# Patient Record
Sex: MALE | Race: WHITE | NOT HISPANIC OR LATINO | Employment: OTHER | URBAN - METROPOLITAN AREA
[De-identification: names, ages, dates, MRNs, and addresses within clinical notes are randomized per-mention and may not be internally consistent; named-entity substitution may affect disease eponyms.]

---

## 2019-09-12 ENCOUNTER — HISTORICAL (OUTPATIENT)
Dept: ADMINISTRATIVE | Facility: HOSPITAL | Age: 49
End: 2019-09-12

## 2019-09-23 ENCOUNTER — HISTORICAL (OUTPATIENT)
Dept: ADMINISTRATIVE | Facility: HOSPITAL | Age: 49
End: 2019-09-23

## 2019-09-30 ENCOUNTER — HISTORICAL (OUTPATIENT)
Dept: ADMINISTRATIVE | Facility: HOSPITAL | Age: 49
End: 2019-09-30

## 2019-09-30 LAB
ABS NEUT (OLG): 6.12 X10(3)/MCL (ref 2.1–9.2)
ALBUMIN SERPL-MCNC: 3.6 GM/DL (ref 3.4–5)
ALBUMIN/GLOB SERPL: 1.2 {RATIO}
ALP SERPL-CCNC: 76 UNIT/L (ref 50–136)
ALT SERPL-CCNC: 17 UNIT/L (ref 12–78)
AST SERPL-CCNC: 7 UNIT/L (ref 15–37)
BASOPHILS # BLD AUTO: 0 X10(3)/MCL (ref 0–0.2)
BASOPHILS NFR BLD AUTO: 0 %
BILIRUB SERPL-MCNC: 0.2 MG/DL (ref 0.2–1)
BILIRUBIN DIRECT+TOT PNL SERPL-MCNC: 0.1 MG/DL (ref 0–0.2)
BILIRUBIN DIRECT+TOT PNL SERPL-MCNC: 0.1 MG/DL (ref 0–0.8)
BUN SERPL-MCNC: 20 MG/DL (ref 7–18)
CALCIUM SERPL-MCNC: 9.2 MG/DL (ref 8.5–10.1)
CHLORIDE SERPL-SCNC: 102 MMOL/L (ref 98–107)
CO2 SERPL-SCNC: 33 MMOL/L (ref 21–32)
CREAT SERPL-MCNC: 1.05 MG/DL (ref 0.7–1.3)
CRP SERPL HS-MCNC: 13.6 MG/L (ref 0–3)
EOSINOPHIL # BLD AUTO: 0.2 X10(3)/MCL (ref 0–0.9)
EOSINOPHIL NFR BLD AUTO: 2 %
ERYTHROCYTE [DISTWIDTH] IN BLOOD BY AUTOMATED COUNT: 13.2 % (ref 11.5–17)
ERYTHROCYTE [SEDIMENTATION RATE] IN BLOOD: 20 MM/HR (ref 0–15)
EST. AVERAGE GLUCOSE BLD GHB EST-MCNC: 128 MG/DL
GLOBULIN SER-MCNC: 3 GM/DL (ref 2.4–3.5)
GLUCOSE SERPL-MCNC: 86 MG/DL (ref 74–106)
HBA1C MFR BLD: 6.1 % (ref 4.2–6.3)
HCT VFR BLD AUTO: 36.4 % (ref 42–52)
HGB BLD-MCNC: 11.4 GM/DL (ref 14–18)
LYMPHOCYTES # BLD AUTO: 2.4 X10(3)/MCL (ref 0.6–4.6)
LYMPHOCYTES NFR BLD AUTO: 25 %
MCH RBC QN AUTO: 29.9 PG (ref 27–31)
MCHC RBC AUTO-ENTMCNC: 31.3 GM/DL (ref 33–36)
MCV RBC AUTO: 95.5 FL (ref 80–94)
MONOCYTES # BLD AUTO: 0.7 X10(3)/MCL (ref 0.1–1.3)
MONOCYTES NFR BLD AUTO: 7 %
NEUTROPHILS # BLD AUTO: 6.12 X10(3)/MCL (ref 2.1–9.2)
NEUTROPHILS NFR BLD AUTO: 64 %
PLATELET # BLD AUTO: 288 X10(3)/MCL (ref 130–400)
PMV BLD AUTO: 10.1 FL (ref 9.4–12.4)
POTASSIUM SERPL-SCNC: 4.6 MMOL/L (ref 3.5–5.1)
PREALB SERPL-MCNC: 29.3 MG/DL (ref 18–38)
PROT SERPL-MCNC: 6.6 GM/DL (ref 6.4–8.2)
RBC # BLD AUTO: 3.81 X10(6)/MCL (ref 4.7–6.1)
SODIUM SERPL-SCNC: 137 MMOL/L (ref 136–145)
WBC # SPEC AUTO: 9.5 X10(3)/MCL (ref 4.5–11.5)

## 2019-10-14 ENCOUNTER — HISTORICAL (OUTPATIENT)
Dept: ADMINISTRATIVE | Facility: HOSPITAL | Age: 49
End: 2019-10-14

## 2019-11-07 ENCOUNTER — HISTORICAL (OUTPATIENT)
Dept: ADMINISTRATIVE | Facility: HOSPITAL | Age: 49
End: 2019-11-07

## 2019-11-22 ENCOUNTER — HISTORICAL (OUTPATIENT)
Dept: ADMINISTRATIVE | Facility: HOSPITAL | Age: 49
End: 2019-11-22

## 2020-02-03 ENCOUNTER — HISTORICAL (OUTPATIENT)
Dept: ADMINISTRATIVE | Facility: HOSPITAL | Age: 50
End: 2020-02-03

## 2020-02-03 LAB
ABS NEUT (OLG): 6.74 X10(3)/MCL (ref 2.1–9.2)
ALBUMIN SERPL-MCNC: 3.6 GM/DL (ref 3.4–5)
ALBUMIN/GLOB SERPL: 1.1 {RATIO}
ALP SERPL-CCNC: 91 UNIT/L (ref 50–136)
ALT SERPL-CCNC: 19 UNIT/L (ref 12–78)
AST SERPL-CCNC: 10 UNIT/L (ref 15–37)
BASOPHILS # BLD AUTO: 0.1 X10(3)/MCL (ref 0–0.2)
BASOPHILS NFR BLD AUTO: 1 %
BILIRUB SERPL-MCNC: 0.3 MG/DL (ref 0.2–1)
BILIRUBIN DIRECT+TOT PNL SERPL-MCNC: 0.1 MG/DL (ref 0–0.2)
BILIRUBIN DIRECT+TOT PNL SERPL-MCNC: 0.2 MG/DL (ref 0–0.8)
BUN SERPL-MCNC: 18 MG/DL (ref 7–18)
CALCIUM SERPL-MCNC: 8.7 MG/DL (ref 8.5–10.1)
CHLORIDE SERPL-SCNC: 101 MMOL/L (ref 98–107)
CO2 SERPL-SCNC: 27 MMOL/L (ref 21–32)
CREAT SERPL-MCNC: 1.13 MG/DL (ref 0.7–1.3)
CRP SERPL HS-MCNC: 23.4 MG/L (ref 0–3)
EOSINOPHIL # BLD AUTO: 0.3 X10(3)/MCL (ref 0–0.9)
EOSINOPHIL NFR BLD AUTO: 3 %
ERYTHROCYTE [DISTWIDTH] IN BLOOD BY AUTOMATED COUNT: 13.9 % (ref 11.5–17)
ERYTHROCYTE [SEDIMENTATION RATE] IN BLOOD: 36 MM/HR (ref 0–15)
EST. AVERAGE GLUCOSE BLD GHB EST-MCNC: 120 MG/DL
GLOBULIN SER-MCNC: 3.4 GM/DL (ref 2.4–3.5)
GLUCOSE SERPL-MCNC: 100 MG/DL (ref 74–106)
HBA1C MFR BLD: 5.8 % (ref 4.2–6.3)
HCT VFR BLD AUTO: 34.5 % (ref 42–52)
HGB BLD-MCNC: 11.2 GM/DL (ref 14–18)
LYMPHOCYTES # BLD AUTO: 1.8 X10(3)/MCL (ref 0.6–4.6)
LYMPHOCYTES NFR BLD AUTO: 18 %
MCH RBC QN AUTO: 31.2 PG (ref 27–31)
MCHC RBC AUTO-ENTMCNC: 32.5 GM/DL (ref 33–36)
MCV RBC AUTO: 96.1 FL (ref 80–94)
MONOCYTES # BLD AUTO: 0.7 X10(3)/MCL (ref 0.1–1.3)
MONOCYTES NFR BLD AUTO: 7 %
NEUTROPHILS # BLD AUTO: 6.74 X10(3)/MCL (ref 2.1–9.2)
NEUTROPHILS NFR BLD AUTO: 68 %
PLATELET # BLD AUTO: 326 X10(3)/MCL (ref 130–400)
PMV BLD AUTO: 9.7 FL (ref 9.4–12.4)
POTASSIUM SERPL-SCNC: 4.6 MMOL/L (ref 3.5–5.1)
PREALB SERPL-MCNC: 25.2 MG/DL (ref 18–38)
PROT SERPL-MCNC: 7 GM/DL (ref 6.4–8.2)
RBC # BLD AUTO: 3.59 X10(6)/MCL (ref 4.7–6.1)
SODIUM SERPL-SCNC: 134 MMOL/L (ref 136–145)
WBC # SPEC AUTO: 9.9 X10(3)/MCL (ref 4.5–11.5)

## 2020-02-10 ENCOUNTER — HISTORICAL (OUTPATIENT)
Dept: ADMINISTRATIVE | Facility: HOSPITAL | Age: 50
End: 2020-02-10

## 2020-02-13 LAB — FINAL CULTURE: NORMAL

## 2020-02-19 ENCOUNTER — HISTORICAL (OUTPATIENT)
Dept: ADMINISTRATIVE | Facility: HOSPITAL | Age: 50
End: 2020-02-19

## 2020-02-27 ENCOUNTER — HISTORICAL (OUTPATIENT)
Dept: ADMINISTRATIVE | Facility: HOSPITAL | Age: 50
End: 2020-02-27

## 2020-03-09 ENCOUNTER — HISTORICAL (OUTPATIENT)
Dept: ADMINISTRATIVE | Facility: HOSPITAL | Age: 50
End: 2020-03-09

## 2022-04-27 ENCOUNTER — HISTORICAL (OUTPATIENT)
Dept: ADMINISTRATIVE | Facility: HOSPITAL | Age: 52
End: 2022-04-27
Payer: MEDICARE

## 2022-04-27 LAB
ABS NEUT (OLG): 5.8 (ref 2.1–9.2)
ALBUMIN SERPL-MCNC: 3.3 G/DL (ref 3.5–5)
ALBUMIN/GLOB SERPL: 1.2 {RATIO} (ref 1.1–2)
ALP SERPL-CCNC: 68 U/L (ref 40–150)
ALT SERPL-CCNC: 17 U/L (ref 0–55)
AMYLASE SERPL-CCNC: 53 U/L (ref 25–125)
AST SERPL-CCNC: 13 U/L (ref 5–34)
BASOPHILS # BLD AUTO: 0.1 10*3/UL (ref 0–0.2)
BASOPHILS NFR BLD AUTO: 1 %
BILIRUB SERPL-MCNC: 0.4 MG/DL
BILIRUBIN DIRECT+TOT PNL SERPL-MCNC: 0.1 (ref 0–0.5)
BILIRUBIN DIRECT+TOT PNL SERPL-MCNC: 0.3 (ref 0–0.8)
BUN SERPL-MCNC: 12.5 MG/DL (ref 8.4–25.7)
CALCIUM SERPL-MCNC: 8.9 MG/DL (ref 8.7–10.5)
CHLORIDE SERPL-SCNC: 104 MMOL/L (ref 98–107)
CO2 SERPL-SCNC: 29 MMOL/L (ref 22–29)
CREAT SERPL-MCNC: 0.96 MG/DL (ref 0.73–1.18)
EOSINOPHIL # BLD AUTO: 0.4 10*3/UL (ref 0–0.9)
EOSINOPHIL NFR BLD AUTO: 4 %
ERYTHROCYTE [DISTWIDTH] IN BLOOD BY AUTOMATED COUNT: 16.3 % (ref 11.5–17)
GLOBULIN SER-MCNC: 2.8 G/DL (ref 2.4–3.5)
GLUCOSE SERPL-MCNC: 77 MG/DL (ref 74–100)
HCT VFR BLD AUTO: 32.4 % (ref 42–52)
HEMOLYSIS INTERF INDEX SERPL-ACNC: 2
HGB BLD-MCNC: 10.3 G/DL (ref 14–18)
ICTERIC INTERF INDEX SERPL-ACNC: 1
IGA SERPL-MCNC: 155 MG/DL (ref 63–484)
LIPASE SERPL-CCNC: 31 U/L
LIPEMIC INTERF INDEX SERPL-ACNC: 4
LYMPHOCYTES # BLD AUTO: 2.4 10*3/UL (ref 0.6–4.6)
LYMPHOCYTES NFR BLD AUTO: 25 %
MANUAL DIFF? (OHS): NO
MCH RBC QN AUTO: 29.3 PG (ref 27–31)
MCHC RBC AUTO-ENTMCNC: 31.8 G/DL (ref 33–36)
MCV RBC AUTO: 92 FL (ref 80–94)
MONOCYTES # BLD AUTO: 0.8 10*3/UL (ref 0.1–1.3)
MONOCYTES NFR BLD AUTO: 8 %
NEUTROPHILS # BLD AUTO: 5.8 10*3/UL (ref 2.1–9.2)
NEUTROPHILS NFR BLD AUTO: 60 %
PLATELET # BLD AUTO: 298 10*3/UL (ref 130–400)
PMV BLD AUTO: 11.1 FL (ref 9.4–12.4)
POTASSIUM SERPL-SCNC: 4.7 MMOL/L (ref 3.5–5.1)
PROT SERPL-MCNC: 6.1 G/DL (ref 6.4–8.3)
RBC # BLD AUTO: 3.52 10*6/UL (ref 4.7–6.1)
SODIUM SERPL-SCNC: 140 MMOL/L (ref 136–145)
TSH SERPL-ACNC: 1.87 M[IU]/L (ref 0.35–4.94)
WBC # SPEC AUTO: 9.6 10*3/UL (ref 4.5–11.5)

## 2022-05-19 ENCOUNTER — OFFICE VISIT (OUTPATIENT)
Dept: WOUND CARE | Facility: HOSPITAL | Age: 52
End: 2022-05-19
Attending: EMERGENCY MEDICINE
Payer: MEDICARE

## 2022-05-19 VITALS
SYSTOLIC BLOOD PRESSURE: 172 MMHG | DIASTOLIC BLOOD PRESSURE: 64 MMHG | BODY MASS INDEX: 45.1 KG/M2 | RESPIRATION RATE: 20 BRPM | TEMPERATURE: 98 F | HEART RATE: 74 BPM | HEIGHT: 70 IN | WEIGHT: 315 LBS

## 2022-05-19 DIAGNOSIS — L97.519 ULCER OF RIGHT GREAT TOE DUE TO DIABETES MELLITUS: ICD-10-CM

## 2022-05-19 DIAGNOSIS — F32.9 REACTIVE DEPRESSION: ICD-10-CM

## 2022-05-19 DIAGNOSIS — E11.42 DIABETIC POLYNEUROPATHY ASSOCIATED WITH TYPE 2 DIABETES MELLITUS: ICD-10-CM

## 2022-05-19 DIAGNOSIS — E11.621 ULCER OF RIGHT GREAT TOE DUE TO DIABETES MELLITUS: ICD-10-CM

## 2022-05-19 DIAGNOSIS — E66.01 MORBID OBESITY: ICD-10-CM

## 2022-05-19 DIAGNOSIS — E78.2 MIXED HYPERLIPIDEMIA: ICD-10-CM

## 2022-05-19 DIAGNOSIS — F31.81 BIPOLAR II DISORDER: ICD-10-CM

## 2022-05-19 DIAGNOSIS — L97.509 TYPE 2 DIABETES MELLITUS WITH FOOT ULCER, WITHOUT LONG-TERM CURRENT USE OF INSULIN: Primary | ICD-10-CM

## 2022-05-19 DIAGNOSIS — E11.621 TYPE 2 DIABETES MELLITUS WITH FOOT ULCER, WITHOUT LONG-TERM CURRENT USE OF INSULIN: Primary | ICD-10-CM

## 2022-05-19 DIAGNOSIS — G89.4 CHRONIC PAIN SYNDROME: ICD-10-CM

## 2022-05-19 DIAGNOSIS — E11.621 DIABETIC ULCER OF TOE OF LEFT FOOT ASSOCIATED WITH TYPE 2 DIABETES MELLITUS, WITH NECROSIS OF BONE: ICD-10-CM

## 2022-05-19 DIAGNOSIS — I10 ESSENTIAL HYPERTENSION, BENIGN: ICD-10-CM

## 2022-05-19 DIAGNOSIS — L97.524 DIABETIC ULCER OF TOE OF LEFT FOOT ASSOCIATED WITH TYPE 2 DIABETES MELLITUS, WITH NECROSIS OF BONE: ICD-10-CM

## 2022-05-19 PROCEDURE — 11042 DBRDMT SUBQ TIS 1ST 20SQCM/<: CPT

## 2022-05-19 RX ORDER — PROMETHAZINE HYDROCHLORIDE 12.5 MG/1
TABLET ORAL
COMMUNITY
Start: 2022-04-27

## 2022-05-19 RX ORDER — QUETIAPINE FUMARATE 100 MG/1
200 TABLET, FILM COATED ORAL NIGHTLY
COMMUNITY
Start: 2022-05-17

## 2022-05-19 RX ORDER — METFORMIN HYDROCHLORIDE 1000 MG/1
1000 TABLET ORAL 2 TIMES DAILY
COMMUNITY
Start: 2022-05-10

## 2022-05-19 RX ORDER — PREGABALIN 200 MG/1
200 CAPSULE ORAL 3 TIMES DAILY
COMMUNITY
Start: 2022-05-17

## 2022-05-19 RX ORDER — TADALAFIL 5 MG/1
5 TABLET ORAL DAILY
COMMUNITY
Start: 2022-05-10

## 2022-05-19 RX ORDER — ROSUVASTATIN CALCIUM 10 MG/1
10 TABLET, COATED ORAL DAILY
COMMUNITY
Start: 2022-04-29

## 2022-05-19 RX ORDER — TIZANIDINE 4 MG/1
TABLET ORAL
COMMUNITY
Start: 2022-05-05 | End: 2022-12-12 | Stop reason: ALTCHOICE

## 2022-05-19 RX ORDER — SILODOSIN 8 MG/1
8 CAPSULE ORAL DAILY
COMMUNITY
Start: 2022-02-16

## 2022-05-19 RX ORDER — ZOLPIDEM TARTRATE 10 MG/1
10 TABLET ORAL NIGHTLY
COMMUNITY
Start: 2022-05-17

## 2022-05-19 RX ORDER — INDOMETHACIN 25 MG/1
CAPSULE ORAL
COMMUNITY
Start: 2022-04-14

## 2022-05-19 RX ORDER — ALLOPURINOL 300 MG/1
TABLET ORAL
COMMUNITY
Start: 2022-04-29

## 2022-05-19 RX ORDER — KETOROLAC TROMETHAMINE 30 MG/ML
INJECTION, SOLUTION INTRAMUSCULAR; INTRAVENOUS
COMMUNITY
Start: 2022-05-16

## 2022-05-19 RX ORDER — LISINOPRIL 40 MG/1
40 TABLET ORAL 2 TIMES DAILY
COMMUNITY
Start: 2022-03-09

## 2022-05-19 RX ORDER — OMEPRAZOLE 40 MG/1
40 CAPSULE, DELAYED RELEASE ORAL 2 TIMES DAILY
COMMUNITY
Start: 2022-05-12

## 2022-05-19 NOTE — PROCEDURES
"Debridement    Date/Time: 5/19/2022 1:00 PM  Performed by: Soheila Vidal MD  Authorized by: Soheila Vidal MD     Time out: Immediately prior to procedure a "time out" was called to verify the correct patient, procedure, equipment, support staff and site/side marked as required.    Consent Done?:  Yes (Written)    Preparation: Patient was prepped and draped in usual sterile fashion    Local anesthesia used?: Yes    Local anesthetic:  Lidocaine 2% topical gel    Wound Details:    Location:  Right foot    Location:  Right 1st Toe    Type of Debridement:  Excisional       Length (cm):  1       Area (sq cm):  1       Width (cm):  1       Percent Debrided (%):  100       Depth (cm):  0.7       Total Area Debrided (sq cm):  1    Depth of debridement:  Subcutaneous tissue    Tissue debrided:  Dermis, Epidermis and Subcutaneous    Devitalized tissue debrided:  Biofilm and Slough    Instruments:  Curette    Bleeding:  Minimal  Hemostasis Achieved: Yes    Method Used:  Pressure  Patient tolerance:  Patient tolerated the procedure well with no immediate complications      "

## 2022-05-19 NOTE — PROGRESS NOTES
Subjective:       Patient ID: Jong Clayton is a 51 y.o. male.    Chief Complaint: No chief complaint on file.    51-year-old obese white male with diabetes, hypertension, dyslipidemia, bipolar and depression along with prior DFU's with osteomyelitis (left 2nd toe 2019 and again 2020). I did treat him when he had this issue and noted then he was noncompliant with my recommendations. I last saw him on 3/9/20 where he had a bulbous left 2nd hammer toe with +MRI for osteo. He never returned as scheduled but ultimately had his ortho removed the distal phalanx/ involved portion of the osteomyelitis and wound healed..    He comes in today on 5/19/22 upon his own self referral complaining of a right plantar diabetic foot ulcer on the right great toe that began probably around December of 2021. He did not seek treatment at all.  I believe his wife is the one that finally got him to come to the clinic.  He remains an uncontrolled diabetic.  I looked at his labs and saw that his last hemoglobin A1c last month was 9.1: he says he was not even aware of this. Says he is seeing PCP next month (I think [facility]).  Ongoing neuropathy/no pain to feet. No fever/chills; not sure how the ulcer began.      Review of Systems   Constitutional: Negative for chills and fever.   HENT: Negative for sore throat.    Respiratory: Negative for chest tightness and shortness of breath.    Cardiovascular: Negative for palpitations and leg swelling.   Gastrointestinal: Positive for abdominal pain (gallstones; plans removal) and diarrhea (seeing GI for scopes).   Genitourinary: Negative for difficulty urinating.   Neurological: Positive for numbness (to both feet). Negative for dizziness, syncope and weakness.         Objective:      Physical Exam  Constitutional:       Appearance: He is obese.   Eyes:      Conjunctiva/sclera: Conjunctivae normal.   Cardiovascular:      Pulses: Normal pulses.           Dorsalis pedis pulses are 2+ on the right side and  2+ on the left side.      Comments: +hair on legs  Musculoskeletal:      Right lower leg: No edema.      Left lower leg: No edema.        Feet:       Comments: Prior left partial 2nd toe amputation; healed   Neurological:      Mental Status: He is alert.              Wound 05/19/22 1340 Diabetic Ulcer Right lateral Toe, first #1 (Active)   05/19/22 1340    Pre-existing: Yes   Primary Wound Type: Diabetic ulc   Side: Right   Orientation: lateral   Location: Toe, first   Wound Number: #1   Ankle-Brachial Index: Non Compressable   Pulses: Biphasic   Removal Indication and Assessment:    Wound Outcome:    (Retired) Wound Type:    (Retired) Wound Length (cm):    (Retired) Wound Width (cm):    (Retired) Depth (cm):    Wound Description (Comments):    Removal Indications:    Wound Image   05/19/22 1348   Dressing Appearance No dressing 05/19/22 1340   Drainage Amount Moderate 05/19/22 1340   Drainage Characteristics/Odor Serosanguineous;Yellow 05/19/22 1340   Appearance Pink;Fibrin;Other (see comments) 05/19/22 1340   Tissue loss description Full thickness 05/19/22 1340   Black (%), Wound Tissue Color 0 % 05/19/22 1340   Red (%), Wound Tissue Color 0 % 05/19/22 1340   Yellow (%), Wound Tissue Color 50 % 05/19/22 1340   Periwound Area Intact;Dry 05/19/22 1340   Wound Edges Defined 05/19/22 1340   Sanchez Classification (diabetic foot ulcers only) Grade 1 05/19/22 1340   Wound Length (cm) 1 cm 05/19/22 1340   Wound Width (cm) 1 cm 05/19/22 1340   Wound Depth (cm) 0.7 cm 05/19/22 1340   Wound Volume (cm^3) 0.7 cm^3 05/19/22 1340   Wound Surface Area (cm^2) 1 cm^2 05/19/22 1340   Care Cleansed with:;Soap and water 05/19/22 1340   Dressing Applied 05/19/22 1340   Periwound Care Absorptive dressing applied 05/19/22 1340           Assessment:       1. Type 2 diabetes mellitus with foot ulcer, without long-term current use of insulin    2. Ulcer of right great toe due to diabetes mellitus    3. Diabetic polyneuropathy associated  with type 2 diabetes mellitus    4. Diabetic ulcer of toe of left foot associated with type 2 diabetes mellitus, with necrosis of bone    5. Morbid obesity    6. Essential hypertension, benign    7. Bipolar II disorder    8. Mixed hyperlipidemia    9. Reactive depression    10. Chronic pain syndrome          1. Right hallux DFU: first clinic visit 5/19/22  2. Uncontrolled diabetes, hba1c 9.1 April 2022  3. Prior left 2nd toe DFU with osteomyelitis: 2020  4. Morbid obesity with bmi 54  5. Hypertension  6. Hyperlipidemia  7. H/o depression/bipolar        Lab Results   Component Value Date    WBC 9.6 04/27/2022    HGB 10.3 04/27/2022    HCT 32.4 04/27/2022    MCV 92.0 04/27/2022     04/27/2022         CMP  Sodium Level   Date Value Ref Range Status   04/27/2022 140 136 - 145      Potassium Level   Date Value Ref Range Status   04/27/2022 4.7 3.5 - 5.1      Carbon Dioxide   Date Value Ref Range Status   04/27/2022 29 22 - 29      Blood Urea Nitrogen   Date Value Ref Range Status   04/27/2022 12.5 8.4 - 25.7      Creatinine   Date Value Ref Range Status   04/27/2022 0.96 0.73 - 1.18      Calcium Level Total   Date Value Ref Range Status   04/27/2022 8.9 8.7 - 10.5      Albumin Level   Date Value Ref Range Status   04/27/2022 3.3 3.5 - 5.0      Bilirubin Total   Date Value Ref Range Status   04/27/2022 0.4 <=1.5      Alkaline Phosphatase   Date Value Ref Range Status   04/27/2022 68 40 - 150      Aspartate Aminotransferase   Date Value Ref Range Status   04/27/2022 13 5 - 34      Alanine Aminotransferase   Date Value Ref Range Status   04/27/2022 17 0 - 55      Estimated GFR-Non    Date Value Ref Range Status   04/27/2022 >60       Lab Results   Component Value Date    HGBA1C 9.1 (H) 04/27/2021     Lab Results   Component Value Date    SEDRATE 36 (H) 02/03/2020     No results found for: CRP  @lastprealbumin@    Plan:                  Plan of Care:    1. Patient returned to this clinic for a new  issue/DFU of the right great toe that he has noted its presence for the past 6 months but chose to do nothing about it until his wife made him come here.  He is an uncontrolled obese diabetic with a hemoglobin A1c of over 9.  He has had a prior left 2nd toe DFU with osteomyelitis.    2.   Wound was assessed  3. Wound was debrided today.  4. Pt tolerated well  5. Wound Care Orders: dressings of medihoney to be applied to the wound and changed 3x/week and as needed. His wife will do the wound care.  6. I tried to get an xray today here in clinic but can't get orders to go over so he will get [facility] done [facility] before he comes back  7.  Monitor for any signs of infection: watch for increase drainage or pain, fevers, chills, swelling and report this to clinic  8. Offloading: MUST offload the toe /wound for it to heal. We will start with posterior wedge jojo but I suggest he get a rolling knee scooter; May have to use TCC in near future.  9. Nutrition: Must have a high protein diet to support wound  healing; (if renal disease, see nephrologist for amount allowed):  this should be over 100g protein /day (if no kidney issues); Also rec MVI along with vit C, vit D, zinc and Sidney  10. Diabetes: he is uncontrolled; he must get this number down; needs to see PCP and follow strict diet  11. Return to clinic 1 week         The time spent including preparing to see the patient, obtaining patient history and assessment, evaluation of the plan of care, patient/caregiver counseling and education, orders, documentation, coordination of care, and other professional medical management activities for today's encounter was 50 minutes.    Time spent performing procedures during today's encounter was 10 minutes.

## 2022-05-19 NOTE — PATIENT INSTRUCTIONS
Pt seen today by:     Home health and self care DRESSING INSTRUCTIONS:      Wound location: Right Great Toe    Dressings to be changed every other day and as needed if soiled or not intact.    Cleanse wound with wound cleanser or saline  Apply therahoney to the wound bed  Cover with abd pad and secure with cast padding/kerlix/cover roll tape  Wear Posterior Wedge Shoe     Wound may have been debrided in clinic: if so, WHAT YOU NEED TO KNOW:    Debridement is the removal of infected, damaged, or dead tissue so a wound can heal properly. Your wound may need more than one debridement. Debridement can cause bleeding, and a small amount of blood is expected.  AFTER A DEBRIDEMENT:    Keep your wound clean and dry. Do not remove the dressing unless instructed.  Follow the wound care orders provided to you or your home health care provider.  If you have pain, take over the counter pain relievers or pain medication if prescribed.  Elevate the wound and limit excessive activity to prevent bleeding and/or swelling in your wound.  If you see blood coming through the dressing, apply gauze and tape over the dressing and hold firm pressure to the wound with your hand for 5-10 minutes continuously, without peeking, to help the bleeding stop.  Contact [facility] wound care team at [phone] or go to the nearest Emergency department if:    You have a fever greater than 101 taken by mouth.  Your pain gets worse or does not go away, even after taking your regular pain medicine.  Your skin around your wound is red, hot, swollen, or draining pus.  You have bleeding that continues to come through the dressing after holding pressure for 10 minutes       Compression: You may be using a compressive type of dressings to control edema: If so, keep your compression wrap or tubigrip in place. Do not get them wet, they should feel snug. If they feel tight, or cause pain in any way,  elevate your legs above your heart for 15 minutes. If  the wraps still cause pain or you can not tolerate compression,  please remove and notify the clinic or your home health.     Nutrition:  The current daily value (%DV) for protein is 50 grams per day and is meant as a general goal for most people. Further increasing your dietary protein intake is very important for wound healing. Typically one needs over 100g of protein per day to help with wound healing needs.  If you are a dialysis patient or have problems with your kidneys, talk to your Nephrologist about how much protein you can take in with your condition.  Examples of high protein items that can be added to your diet include: eggs, chicken, red meats, almonds, cottage cheese, Greek yogurt, beans, and peanut butter.  Fortified protein bars, shakes and drinks can add 15-30 additional grams of protein per serving.   Also add:   1 daily general multivitamin   Sidney : 1 packet twice daily   Vitamin C : 500mg twice daily   Zinc 220 mg daily  Vit D : once daily    Call our [facility] wound clinic for questions/concerns a [phone] .

## 2022-05-25 ENCOUNTER — HOSPITAL ENCOUNTER (OUTPATIENT)
Dept: RADIOLOGY | Facility: HOSPITAL | Age: 52
Discharge: HOME | End: 2022-05-25
Attending: EMERGENCY MEDICINE
Payer: MEDICARE

## 2022-05-25 DIAGNOSIS — E11.42 DIABETIC POLYNEUROPATHY ASSOCIATED WITH TYPE 2 DIABETES MELLITUS: ICD-10-CM

## 2022-05-25 DIAGNOSIS — L97.519 ULCER OF RIGHT GREAT TOE DUE TO DIABETES MELLITUS: ICD-10-CM

## 2022-05-25 DIAGNOSIS — E11.621 ULCER OF RIGHT GREAT TOE DUE TO DIABETES MELLITUS: ICD-10-CM

## 2022-05-25 PROCEDURE — 73660 X-RAY EXAM OF TOE(S): CPT | Mod: TC,RT

## 2022-05-26 ENCOUNTER — HOSPITAL ENCOUNTER (OUTPATIENT)
Dept: WOUND CARE | Facility: HOSPITAL | Age: 52
Discharge: HOME | End: 2022-05-26
Attending: EMERGENCY MEDICINE
Payer: MEDICARE

## 2022-05-26 VITALS
DIASTOLIC BLOOD PRESSURE: 76 MMHG | WEIGHT: 315 LBS | BODY MASS INDEX: 45.1 KG/M2 | SYSTOLIC BLOOD PRESSURE: 155 MMHG | HEART RATE: 92 BPM | RESPIRATION RATE: 20 BRPM | TEMPERATURE: 98 F | HEIGHT: 70 IN

## 2022-05-26 DIAGNOSIS — L97.519 ULCER OF RIGHT GREAT TOE DUE TO DIABETES MELLITUS: Primary | ICD-10-CM

## 2022-05-26 DIAGNOSIS — L97.509 TYPE 2 DIABETES MELLITUS WITH FOOT ULCER, WITHOUT LONG-TERM CURRENT USE OF INSULIN: ICD-10-CM

## 2022-05-26 DIAGNOSIS — E11.42 DIABETIC POLYNEUROPATHY ASSOCIATED WITH TYPE 2 DIABETES MELLITUS: ICD-10-CM

## 2022-05-26 DIAGNOSIS — E11.621 ULCER OF RIGHT GREAT TOE DUE TO DIABETES MELLITUS: Primary | ICD-10-CM

## 2022-05-26 DIAGNOSIS — E66.01 MORBID OBESITY: ICD-10-CM

## 2022-05-26 DIAGNOSIS — E11.621 TYPE 2 DIABETES MELLITUS WITH FOOT ULCER, WITHOUT LONG-TERM CURRENT USE OF INSULIN: ICD-10-CM

## 2022-05-26 PROCEDURE — 11042 DBRDMT SUBQ TIS 1ST 20SQCM/<: CPT | Performed by: NURSE PRACTITIONER

## 2022-05-26 NOTE — PROCEDURES
"Debridement    Date/Time: 5/26/2022 1:00 PM  Performed by: Soheila Vidal MD  Authorized by: Soheila Vidal MD   Associated wounds:        Wound 05/19/22 1340 Diabetic Ulcer Right lateral Toe, first #1  Time out: Immediately prior to procedure a "time out" was called to verify the correct patient, procedure, equipment, support staff and site/side marked as required.      Preparation: Patient was prepped and draped in usual sterile fashion    Local anesthesia used?: Yes    Local anesthetic:  Lidocaine 2% topical gel    Wound Details:    Location:  Right foot    Location:  Right 1st Toe    Type of Debridement:  Excisional       Length (cm):  0.7       Area (sq cm):  0.21       Width (cm):  0.3       Percent Debrided (%):  100       Depth (cm):  0.2       Total Area Debrided (sq cm):  0.21    Depth of debridement:  Subcutaneous tissue    Tissue debrided:  Dermis, Epidermis and Subcutaneous    Devitalized tissue debrided:  Biofilm, Slough and Callus    Instruments:  Curette    Bleeding:  Minimal  Hemostasis Achieved: Yes    Method Used:  Pressure  Patient tolerance:  Patient tolerated the procedure well with no immediate complications      " No

## 2022-05-26 NOTE — PROGRESS NOTES
Subjective:       Patient ID: Jong Clayton is a 51 y.o. male.    Chief Complaint: No chief complaint on file.    51-year-old obese white male with diabetes, hypertension, dyslipidemia, bipolar and depression along with prior DFU's with osteomyelitis (left 2nd toe 2019 and again 2020). I did treat him when he had this issue and noted then he was noncompliant with my recommendations. I last saw him on 3/9/20 where he had a bulbous left 2nd hammer toe with +MRI for osteo. He never returned as scheduled but ultimately had his ortho removed the distal phalanx/ involved portion of the osteomyelitis and wound healed..    He came in on 5/19/22 upon his own self referral complaining of a right plantar diabetic foot ulcer on the right great toe that began probably around December 2021. He did not seek treatment at all.  I believe his wife is the one that finally got him to come to the clinic.  He remains an uncontrolled diabetic.  I looked at his labs and saw that his last hemoglobin A1c last month in April  was 9.1: he says he was not even aware of this. (seeing Aaron next month in June)  I ordered an xray of the right great toe and debrided it and began medihoney dressings and offloaded with posterior wedge Octavio shoe; wife doing wound care; she thinks a bit better      Review of Systems   Constitutional: Negative for chills and fever.   Neurological: Positive for numbness (to both feet).         Objective:      Physical Exam  Constitutional:       Appearance: He is obese.   Eyes:      Conjunctiva/sclera: Conjunctivae normal.   Cardiovascular:      Pulses: Normal pulses.           Dorsalis pedis pulses are 2+ on the right side and 2+ on the left side.      Comments: +hair on legs  Musculoskeletal:      Right lower leg: No edema.      Left lower leg: No edema.        Feet:       Comments: Prior left partial 2nd toe amputation; healed   Neurological:      Mental Status: He is alert.              Wound 05/19/22 1340  Diabetic Ulcer Right lateral Toe, first #1 (Active)   05/19/22 1340    Pre-existing: Yes   Primary Wound Type: Diabetic ulc   Side: Right   Orientation: lateral   Location: Toe, first   Wound Number: #1   Ankle-Brachial Index: Non Compressable   Pulses: Biphasic   Removal Indication and Assessment:    Wound Outcome:    (Retired) Wound Type:    (Retired) Wound Length (cm):    (Retired) Wound Width (cm):    (Retired) Depth (cm):    Wound Description (Comments):    Removal Indications:    Dressing Appearance Intact;Dried drainage 05/26/22 1316   Drainage Amount Moderate 05/26/22 1316   Drainage Characteristics/Odor Yellow;Serosanguineous 05/26/22 1316   Appearance Pink;Fibrin 05/26/22 1316   Tissue loss description Full thickness 05/26/22 1316   Black (%), Wound Tissue Color 0 % 05/26/22 1316   Red (%), Wound Tissue Color 75 % 05/26/22 1316   Yellow (%), Wound Tissue Color 25 % 05/26/22 1316   Periwound Area Intact;Dry 05/26/22 1316   Wound Edges Defined 05/26/22 1316   Wound Length (cm) 1 cm 05/26/22 1316   Wound Width (cm) 0.7 cm 05/26/22 1316   Wound Depth (cm) 0.3 cm 05/26/22 1316   Wound Volume (cm^3) 0.21 cm^3 05/26/22 1316   Wound Surface Area (cm^2) 0.7 cm^2 05/26/22 1316   Care Cleansed with:;Soap and water 05/26/22 1316   Dressing Applied;Absorptive Pad 05/26/22 1316   Periwound Care Absorptive dressing applied 05/26/22 1316           Assessment:       1. Ulcer of right great toe due to diabetes mellitus    2. Diabetic polyneuropathy associated with type 2 diabetes mellitus    3. Type 2 diabetes mellitus with foot ulcer, without long-term current use of insulin    4. Morbid obesity          1. Right hallux DFU: first clinic visit 5/19/22  2. Uncontrolled diabetes, hba1c 9.1 April 2022  3. Prior left 2nd toe DFU with osteomyelitis: 2020  4. Morbid obesity with bmi 54  5. Hypertension  6. Hyperlipidemia  7. H/o depression/bipolar        Lab Results   Component Value Date    WBC 9.6 04/27/2022    HGB 10.3  04/27/2022    HCT 32.4 04/27/2022    MCV 92.0 04/27/2022     04/27/2022         CMP  Sodium Level   Date Value Ref Range Status   04/27/2022 140 136 - 145      Potassium Level   Date Value Ref Range Status   04/27/2022 4.7 3.5 - 5.1      Carbon Dioxide   Date Value Ref Range Status   04/27/2022 29 22 - 29      Blood Urea Nitrogen   Date Value Ref Range Status   04/27/2022 12.5 8.4 - 25.7      Creatinine   Date Value Ref Range Status   04/27/2022 0.96 0.73 - 1.18      Calcium Level Total   Date Value Ref Range Status   04/27/2022 8.9 8.7 - 10.5      Albumin Level   Date Value Ref Range Status   04/27/2022 3.3 3.5 - 5.0      Bilirubin Total   Date Value Ref Range Status   04/27/2022 0.4 <=1.5      Alkaline Phosphatase   Date Value Ref Range Status   04/27/2022 68 40 - 150      Aspartate Aminotransferase   Date Value Ref Range Status   04/27/2022 13 5 - 34      Alanine Aminotransferase   Date Value Ref Range Status   04/27/2022 17 0 - 55      Estimated GFR-Non    Date Value Ref Range Status   04/27/2022 >60       Lab Results   Component Value Date    HGBA1C 9.1 (H) 04/27/2021     Lab Results   Component Value Date    SEDRATE 36 (H) 02/03/2020     XR TOE 2 OR MORE VIEWS RIGHT     FINDINGS:  There is a linear metallic density identified on the lateral projection on the plantar aspect of the foot exact etiology of this cannot be ascertained by this exam     No radiographic evidence to suggest the presence of osteomyelitis these might be lacking on plain films and therefore if clinically indicated other imaging modalities might prove helpful for further assessment     Soft tissue defect that may indicate the presence of an ulcer identified     Impression:     Soft tissue ulceration.     No radiographic evidence to suggest osteomyelitis.     Radiopaque metallic density as above.        Electronically signed by: Rohan Khanna  Date:                                            05/25/2022  Time:                                            13:55  (I told pt about the metallic density: no knowledge of it)  Plan:                  Plan of Care:    1. Wound was debrided today.  2. Pt tolerated well  3. Wound Care Orders: dressings of medihoney to be applied to the wound and changed 3x/week and as needed. His wife will do the wound care.  4. Reviewed the toe xray with patient and wife  5.  Monitor for any signs of infection: watch for increase drainage or pain, fevers, chills, swelling and report this to clinic  6. Offloading: MUST offload the toe /wound for it to heal. We will start with posterior wedge jojo but I suggest he get a rolling knee scooter; May have to use TCC in near future.  7. Nutrition: Must have a high protein diet to support wound  healing; (if renal disease, see nephrologist for amount allowed):  this should be over 100g protein /day (if no kidney issues); Also rec MVI along with vit C, vit D, zinc and Sidney  8. Diabetes: he is uncontrolled; he must get this number down; needs to see PCP and follow strict diet  9. Return to clinic 1 week

## 2022-05-26 NOTE — PATIENT INSTRUCTIONS
Pt seen today by:     Home health and self care DRESSING INSTRUCTIONS:      Wound location: Right Great Toe    Dressings to be changed every other day and as needed if soiled or not intact.    Cleanse wound with wound cleanser or saline  Apply therahoney to the wound bed  Cover with abd pad and secure with cast padding/kerlix/cover roll tape  Wear Posterior Wedge Shoe     Wound may have been debrided in clinic: if so, WHAT YOU NEED TO KNOW:    Debridement is the removal of infected, damaged, or dead tissue so a wound can heal properly. Your wound may need more than one debridement. Debridement can cause bleeding, and a small amount of blood is expected.  AFTER A DEBRIDEMENT:    Keep your wound clean and dry. Do not remove the dressing unless instructed.  Follow the wound care orders provided to you or your home health care provider.  If you have pain, take over the counter pain relievers or pain medication if prescribed.  Elevate the wound and limit excessive activity to prevent bleeding and/or swelling in your wound.  If you see blood coming through the dressing, apply gauze and tape over the dressing and hold firm pressure to the wound with your hand for 5-10 minutes continuously, without peeking, to help the bleeding stop.  Contact [facility] wound care team at [phone] or go to the nearest Emergency department if:    You have a fever greater than 101 taken by mouth.  Your pain gets worse or does not go away, even after taking your regular pain medicine.  Your skin around your wound is red, hot, swollen, or draining pus.  You have bleeding that continues to come through the dressing after holding pressure for 10 minutes       Compression: You may be using a compressive type of dressings to control edema: If so, keep your compression wrap or tubigrip in place. Do not get them wet, they should feel snug. If they feel tight, or cause pain in any way,  elevate your legs above your heart for 15 minutes. If  the wraps still cause pain or you can not tolerate compression,  please remove and notify the clinic or your home health.     Nutrition:  The current daily value (%DV) for protein is 50 grams per day and is meant as a general goal for most people. Further increasing your dietary protein intake is very important for wound healing. Typically one needs over 100g of protein per day to help with wound healing needs.  If you are a dialysis patient or have problems with your kidneys, talk to your Nephrologist about how much protein you can take in with your condition.  Examples of high protein items that can be added to your diet include: eggs, chicken, red meats, almonds, cottage cheese, Greek yogurt, beans, and peanut butter.  Fortified protein bars, shakes and drinks can add 15-30 additional grams of protein per serving.   Also add:   1 daily general multivitamin   Sidney : 1 packet twice daily   Vitamin C : 500mg twice daily   Zinc 220 mg daily  Vit D : once daily    Call our [facility] wound clinic for questions/concerns a [phone] .

## 2022-06-09 ENCOUNTER — HOSPITAL ENCOUNTER (OUTPATIENT)
Dept: WOUND CARE | Facility: HOSPITAL | Age: 52
Discharge: HOME | End: 2022-06-09
Attending: EMERGENCY MEDICINE
Payer: MEDICARE

## 2022-06-09 VITALS
RESPIRATION RATE: 16 BRPM | HEART RATE: 74 BPM | HEIGHT: 70 IN | TEMPERATURE: 98 F | DIASTOLIC BLOOD PRESSURE: 64 MMHG | WEIGHT: 315 LBS | SYSTOLIC BLOOD PRESSURE: 130 MMHG | BODY MASS INDEX: 45.1 KG/M2

## 2022-06-09 DIAGNOSIS — L97.519 ULCER OF RIGHT GREAT TOE DUE TO DIABETES MELLITUS: Primary | ICD-10-CM

## 2022-06-09 DIAGNOSIS — E11.621 ULCER OF RIGHT GREAT TOE DUE TO DIABETES MELLITUS: Primary | ICD-10-CM

## 2022-06-09 DIAGNOSIS — E66.01 MORBID OBESITY: ICD-10-CM

## 2022-06-09 DIAGNOSIS — E11.42 DIABETIC POLYNEUROPATHY ASSOCIATED WITH TYPE 2 DIABETES MELLITUS: ICD-10-CM

## 2022-06-09 DIAGNOSIS — L97.521 ULCER OF LEFT SECOND TOE, LIMITED TO BREAKDOWN OF SKIN: ICD-10-CM

## 2022-06-09 DIAGNOSIS — L97.509 TYPE 2 DIABETES MELLITUS WITH FOOT ULCER, WITHOUT LONG-TERM CURRENT USE OF INSULIN: ICD-10-CM

## 2022-06-09 DIAGNOSIS — E11.621 TYPE 2 DIABETES MELLITUS WITH FOOT ULCER, WITHOUT LONG-TERM CURRENT USE OF INSULIN: ICD-10-CM

## 2022-06-09 LAB — POCT GLUCOSE: 180 MG/DL (ref 70–110)

## 2022-06-09 PROCEDURE — 89240 UNLISTED MISC PATH TEST: CPT

## 2022-06-09 PROCEDURE — 11042 DBRDMT SUBQ TIS 1ST 20SQCM/<: CPT

## 2022-06-09 RX ORDER — OXYCODONE AND ACETAMINOPHEN 10; 325 MG/1; MG/1
TABLET ORAL
COMMUNITY
Start: 2022-05-27

## 2022-06-09 RX ORDER — HYDROCODONE BITARTRATE AND ACETAMINOPHEN 10; 325 MG/1; MG/1
1 TABLET ORAL 2 TIMES DAILY PRN
COMMUNITY
Start: 2022-05-27

## 2022-06-09 RX ORDER — SOD SULF/POT CHLORIDE/MAG SULF 1.479 G
1 TABLET ORAL
COMMUNITY
Start: 2022-06-01

## 2022-06-09 NOTE — PROCEDURES
"Debridement    Date/Time: 6/9/2022 1:00 PM  Performed by: Soheila Vidal MD  Authorized by: Soheila Vidal MD     Time out: Immediately prior to procedure a "time out" was called to verify the correct patient, procedure, equipment, support staff and site/side marked as required.    Consent Done?:  Yes (Written)    Preparation: Patient was prepped and draped in usual sterile fashion    Local anesthetic:  Lidocaine 2% topical gel    Wound Details:    Location:  Right foot (planter/medial side)    Location:  Right 1st Toe    Type of Debridement:  Excisional       Length (cm):  1       Area (sq cm):  0.5       Width (cm):  0.5       Percent Debrided (%):  100       Depth (cm):  0.4       Total Area Debrided (sq cm):  0.5    Depth of debridement:  Subcutaneous tissue    Tissue debrided:  Dermis, Epidermis and Subcutaneous    Devitalized tissue debrided:  Biofilm, Slough and Callus    Instruments:  Curette    Bleeding:  Minimal  Hemostasis Achieved: Yes    Method Used:  Pressure  Patient tolerance:  Patient tolerated the procedure well with no immediate complications      "

## 2022-06-09 NOTE — PROGRESS NOTES
Subjective:       Patient ID: Jnog Clayton is a 51 y.o. male.    Chief Complaint: No chief complaint on file.    51-year-old obese white male with diabetes, hypertension, dyslipidemia, bipolar and depression along with prior DFU's with osteomyelitis (left 2nd toe 2019 and again 2020). I did treat him when he had this issue and noted then he was noncompliant with my recommendations. I last saw him on 3/9/20 where he had a bulbous left 2nd hammer toe with +MRI for osteo. He never returned as scheduled but ultimately followed through with my prior recs of distal tip amputation.    He came in on 5/19/22 upon his own self referral complaining of a right plantar diabetic foot ulcer on the right great toe that began probably around December 2021. He did not seek treatment at all.  I believe his wife is the one that finally got him to come to the clinic.  He remains an uncontrolled diabetic( hemoglobin A1c April 9.1. I ordered an xray of the right great toe and debrided it and began medihoney dressings and offloaded with posterior wedge Octavio shoe; wife doing wound care; He had to miss last visit: comes in today on 6/9/22 saying right hallux ulcer looks better but presents with new ulcer under left 2nd toe; not sure what happened.      Review of Systems   Constitutional: Negative for chills and fever.   Neurological: Positive for numbness (to both feet).         Objective:       Vitals:    06/09/22 1328   BP: 130/64   Pulse: 74   Resp: 16   Temp: 98.1 °F (36.7 °C)     Recent Labs   Lab 06/09/22  1328   POCTGLUCOSE 180*       Physical Exam  Constitutional:       Appearance: He is obese.   Eyes:      Conjunctiva/sclera: Conjunctivae normal.   Cardiovascular:      Pulses: Normal pulses.           Dorsalis pedis pulses are 2+ on the right side and 2+ on the left side.      Comments: +hair on legs  Musculoskeletal:      Right lower leg: No edema.      Left lower leg: No edema.        Feet:       Comments: Prior left partial  2nd toe amputation; healed   Neurological:      Mental Status: He is alert.              Wound 05/19/22 1340 Diabetic Ulcer Right lateral Toe, first #1 (Active)   05/19/22 1340    Pre-existing: Yes   Primary Wound Type: Diabetic ulc   Side: Right   Orientation: lateral   Location: Toe, first   Wound Number: #1   Ankle-Brachial Index: Non Compressable   Pulses: Biphasic   Removal Indication and Assessment:    Wound Outcome:    (Retired) Wound Type:    (Retired) Wound Length (cm):    (Retired) Wound Width (cm):    (Retired) Depth (cm):    Wound Description (Comments):    Removal Indications:    Wound Image   06/09/22 1404   Dressing Appearance Dry;Intact 06/09/22 1329   Drainage Amount Moderate 06/09/22 1329   Drainage Characteristics/Odor Yellow;Serosanguineous 06/09/22 1329   Appearance Intact;Pink 06/09/22 1329   Tissue loss description Full thickness 06/09/22 1329   Black (%), Wound Tissue Color 0 % 06/09/22 1329   Red (%), Wound Tissue Color 100 % 06/09/22 1329   Yellow (%), Wound Tissue Color 0 % 06/09/22 1329   Periwound Area Intact;Dry 06/09/22 1329   Wound Edges Defined 06/09/22 1329   Sanchez Classification (diabetic foot ulcers only) Grade 2 06/09/22 1329   Wound Length (cm) 1 cm 06/09/22 1329   Wound Width (cm) 0.5 cm 06/09/22 1329   Wound Depth (cm) 0.4 cm 06/09/22 1329   Wound Volume (cm^3) 0.2 cm^3 06/09/22 1329   Wound Surface Area (cm^2) 0.5 cm^2 06/09/22 1329   Care Cleansed with:;Soap and water 06/09/22 1329   Dressing Applied 06/09/22 1329   Periwound Care Absorptive dressing applied 06/09/22 1329            Wound 06/09/22 1331 Diabetic Ulcer Left Toe, second #2 (Active)   06/09/22 1331    Pre-existing: Yes   Primary Wound Type: Diabetic ulc   Side: Left   Orientation:    Location: Toe, second   Wound Number: #2   Ankle-Brachial Index:    Pulses:    Removal Indication and Assessment:    Wound Outcome:    (Retired) Wound Type:    (Retired) Wound Length (cm):    (Retired) Wound Width (cm):     (Retired) Depth (cm):    Wound Description (Comments):    Removal Indications:    Wound Image   06/09/22 1404   Dressing Appearance Dry;Intact 06/09/22 1329   Drainage Amount Moderate 06/09/22 1329   Drainage Characteristics/Odor Yellow;Serosanguineous 06/09/22 1329   Appearance Pink 06/09/22 1329   Tissue loss description Full thickness 06/09/22 1329   Black (%), Wound Tissue Color 0 % 06/09/22 1329   Red (%), Wound Tissue Color 50 % 06/09/22 1329   Yellow (%), Wound Tissue Color 50 % 06/09/22 1329   Periwound Area Intact;Dry 06/09/22 1329   Wound Edges Defined 06/09/22 1329   Sanchez Classification (diabetic foot ulcers only) Grade 2 06/09/22 1329   Wound Length (cm) 1.5 cm 06/09/22 1329   Wound Width (cm) 1 cm 06/09/22 1329   Wound Depth (cm) 0.2 cm 06/09/22 1329   Wound Volume (cm^3) 0.3 cm^3 06/09/22 1329   Wound Surface Area (cm^2) 1.5 cm^2 06/09/22 1329   Care Cleansed with:;Soap and water 06/09/22 1329   Dressing Applied 06/09/22 1329   Periwound Care Absorptive dressing applied 06/09/22 1329           Assessment:       1. Ulcer of right great toe due to diabetes mellitus    2. Ulcer of left second toe, limited to breakdown of skin    3. Diabetic polyneuropathy associated with type 2 diabetes mellitus    4. Type 2 diabetes mellitus with foot ulcer, without long-term current use of insulin    5. Morbid obesity          ·  Right hallux DFU: first clinic visit 5/19/22, neg hallux xray: stable  · New ulcer left 2nd toe, plantar side: noted 6/9/22  · Uncontrolled diabetes, hba1c 9.1 April 2022  · Prior left 2nd toe DFU with osteomyelitis: 2020  · Morbid obesity with bmi 54  · Hypertension  ·  Hyperlipidemia  · H/o depression/bipolar      Lab Results   Component Value Date    HGBA1C 9.1 (H) 04/27/2021       Plan:           Plan of Care:    1. Right hallux DFU debrided  2. New left volar 2nd toe ulcer noted today: dry ;not infected  3. Can use medihoney on  New ulcer as well and let's get him a jojo shoe for left  foot too  4. Wound Care Orders: dressings of medihoney to be applied to the wound and changed 3x/week and as needed. His wife will do the wound care.  5.  Monitor for any signs of infection: watch for increase drainage or pain, fevers, chills, swelling and report this to clinic  6. Offloading: MUST offload the toe /wound for it to heal. We will start with posterior wedge jojo but I suggest he get a rolling knee scooter; May have to use TCC in near future.  7. Nutrition: Must have a high protein diet to support wound  healing; (if renal disease, see nephrologist for amount allowed):  this should be over 100g protein /day (if no kidney issues); Also rec MVI along with vit C, vit D, zinc and Sidney  8. Diabetes: he is uncontrolled; he must get this number down; needs to see PCP and follow strict diet  9. Return to clinic 1 week         The time spent including preparing to see the patient, obtaining patient history and assessment, evaluation of the plan of care, patient/caregiver counseling and education, orders, documentation, coordination of care, and other professional medical management activities for today's encounter was20 minutes. (addressed new issue on left foot)  Time spent performing procedures during today's encounter was 8 minutes.

## 2022-06-09 NOTE — PATIENT INSTRUCTIONS
Pt seen today by:     Home health and self care DRESSING INSTRUCTIONS:      Wound location: Right Great Toe/Left second toe    Dressings to be changed every other day and as needed if soiled or not intact.    Cleanse wound with wound cleanser or saline  Apply therahoney to the wound bed  Cover with abd pad and secure with cast padding/kerlix/cover roll tape  Wear Posterior Wedge Shoe     Wound may have been debrided in clinic: if so, WHAT YOU NEED TO KNOW:    Debridement is the removal of infected, damaged, or dead tissue so a wound can heal properly. Your wound may need more than one debridement. Debridement can cause bleeding, and a small amount of blood is expected.  AFTER A DEBRIDEMENT:    Keep your wound clean and dry. Do not remove the dressing unless instructed.  Follow the wound care orders provided to you or your home health care provider.  If you have pain, take over the counter pain relievers or pain medication if prescribed.  Elevate the wound and limit excessive activity to prevent bleeding and/or swelling in your wound.  If you see blood coming through the dressing, apply gauze and tape over the dressing and hold firm pressure to the wound with your hand for 5-10 minutes continuously, without peeking, to help the bleeding stop.  Contact [facility] wound care team at [phone] or go to the nearest Emergency department if:    You have a fever greater than 101 taken by mouth.  Your pain gets worse or does not go away, even after taking your regular pain medicine.  Your skin around your wound is red, hot, swollen, or draining pus.  You have bleeding that continues to come through the dressing after holding pressure for 10 minutes       Compression: You may be using a compressive type of dressings to control edema: If so, keep your compression wrap or tubigrip in place. Do not get them wet, they should feel snug. If they feel tight, or cause pain in any way,  elevate your legs above your heart for  15 minutes. If the wraps still cause pain or you can not tolerate compression,  please remove and notify the clinic or your home health.     Nutrition:  The current daily value (%DV) for protein is 50 grams per day and is meant as a general goal for most people. Further increasing your dietary protein intake is very important for wound healing. Typically one needs over 100g of protein per day to help with wound healing needs.  If you are a dialysis patient or have problems with your kidneys, talk to your Nephrologist about how much protein you can take in with your condition.  Examples of high protein items that can be added to your diet include: eggs, chicken, red meats, almonds, cottage cheese, Greek yogurt, beans, and peanut butter.  Fortified protein bars, shakes and drinks can add 15-30 additional grams of protein per serving.   Also add:   1 daily general multivitamin   Sidney : 1 packet twice daily   Vitamin C : 500mg twice daily   Zinc 220 mg daily  Vit D : once daily    Call our [facility] wound clinic for questions/concerns a [phone] .

## 2022-06-16 ENCOUNTER — HOSPITAL ENCOUNTER (OUTPATIENT)
Dept: WOUND CARE | Facility: HOSPITAL | Age: 52
Discharge: HOME | End: 2022-06-16
Attending: EMERGENCY MEDICINE
Payer: MEDICARE

## 2022-06-16 VITALS
WEIGHT: 315 LBS | RESPIRATION RATE: 20 BRPM | HEART RATE: 90 BPM | DIASTOLIC BLOOD PRESSURE: 95 MMHG | BODY MASS INDEX: 45.1 KG/M2 | HEIGHT: 70 IN | TEMPERATURE: 98 F | SYSTOLIC BLOOD PRESSURE: 133 MMHG

## 2022-06-16 DIAGNOSIS — E11.621 ULCER OF RIGHT GREAT TOE DUE TO DIABETES MELLITUS: Primary | ICD-10-CM

## 2022-06-16 DIAGNOSIS — L97.509 TYPE 2 DIABETES MELLITUS WITH FOOT ULCER, WITHOUT LONG-TERM CURRENT USE OF INSULIN: ICD-10-CM

## 2022-06-16 DIAGNOSIS — E11.621 TYPE 2 DIABETES MELLITUS WITH FOOT ULCER, WITHOUT LONG-TERM CURRENT USE OF INSULIN: ICD-10-CM

## 2022-06-16 DIAGNOSIS — E66.01 MORBID OBESITY: ICD-10-CM

## 2022-06-16 DIAGNOSIS — L97.521 ULCER OF LEFT SECOND TOE, LIMITED TO BREAKDOWN OF SKIN: ICD-10-CM

## 2022-06-16 DIAGNOSIS — E11.42 DIABETIC POLYNEUROPATHY ASSOCIATED WITH TYPE 2 DIABETES MELLITUS: ICD-10-CM

## 2022-06-16 DIAGNOSIS — L97.519 ULCER OF RIGHT GREAT TOE DUE TO DIABETES MELLITUS: Primary | ICD-10-CM

## 2022-06-16 LAB — POCT GLUCOSE: 140 MG/DL (ref 70–110)

## 2022-06-16 PROCEDURE — 11042 DBRDMT SUBQ TIS 1ST 20SQCM/<: CPT | Performed by: NURSE PRACTITIONER

## 2022-06-16 PROCEDURE — 27000999 HC MEDICAL RECORD PHOTO DOCUMENTATION: Performed by: NURSE PRACTITIONER

## 2022-06-16 RX ORDER — DIVALPROEX SODIUM 500 MG/1
TABLET, FILM COATED, EXTENDED RELEASE ORAL
COMMUNITY
Start: 2022-06-13

## 2022-06-16 RX ORDER — VENLAFAXINE HYDROCHLORIDE 75 MG/1
75 CAPSULE, EXTENDED RELEASE ORAL DAILY
COMMUNITY
Start: 2022-06-13

## 2022-06-16 RX ORDER — QUETIAPINE FUMARATE 200 MG/1
400 TABLET, FILM COATED ORAL NIGHTLY
COMMUNITY
Start: 2022-06-13

## 2022-06-16 NOTE — PATIENT INSTRUCTIONS
Pt seen today by:     Home health and self care DRESSING INSTRUCTIONS:      Wound location: Right Great Toe/Left second toe    Dressings to be changed every other day and as needed if soiled or not intact.    Cleanse wound with wound cleanser or saline  Apply therahoney to the wound bed  Cover with abd pad and secure with cast padding/kerlix/cover roll tape  Wear Darco shoe    Wound may have been debrided in clinic: if so, WHAT YOU NEED TO KNOW:    Debridement is the removal of infected, damaged, or dead tissue so a wound can heal properly. Your wound may need more than one debridement. Debridement can cause bleeding, and a small amount of blood is expected.  AFTER A DEBRIDEMENT:  Keep your wound clean and dry. Do not remove the dressing unless instructed.  Follow the wound care orders provided to you or your home health care provider.  If you have pain, take over the counter pain relievers or pain medication if prescribed.  Elevate the wound and limit excessive activity to prevent bleeding and/or swelling in your wound.  If you see blood coming through the dressing, apply gauze and tape over the dressing and hold firm pressure to the wound with your hand for 5-10 minutes continuously, without peeking, to help the bleeding stop.  Contact [facility] wound care team at [phone] or go to the nearest Emergency department if:  You have a fever greater than 101 taken by mouth.  Your pain gets worse or does not go away, even after taking your regular pain medicine.  Your skin around your wound is red, hot, swollen, or draining pus.  You have bleeding that continues to come through the dressing after holding pressure for 10 minutes   Compression: You may be using a compressive type of dressings to control edema: If so, keep your compression wrap or tubigrip in place. Do not get them wet, they should feel snug. If they feel tight, or cause pain in any way,  elevate your legs above your heart for 15 minutes. If the  wraps still cause pain or you can not tolerate compression,  please remove and notify the clinic or your home health.   Nutrition:  The current daily value (%DV) for protein is 50 grams per day and is meant as a general goal for most people. Further increasing your dietary protein intake is very important for wound healing. Typically one needs over 100g of protein per day to help with wound healing needs.  If you are a dialysis patient or have problems with your kidneys, talk to your Nephrologist about how much protein you can take in with your condition.  Examples of high protein items that can be added to your diet include: eggs, chicken, red meats, almonds, cottage cheese, Greek yogurt, beans, and peanut butter.  Fortified protein bars, shakes and drinks can add 15-30 additional grams of protein per serving.   Also add:   1 daily general multivitamin   Sidney : 1 packet twice daily   Vitamin C : 500mg twice daily   Zinc 220 mg daily  Vit D : once daily    Call our [facility] wound clinic for questions/concerns a [phone] .

## 2022-06-16 NOTE — PROGRESS NOTES
Subjective:       Patient ID: Jong Clayton is a 52 y.o. male.    Chief Complaint: No chief complaint on file.    51-year-old obese white male with diabetes, hypertension, dyslipidemia, bipolar and depression along with prior DFU's with osteomyelitis (left 2nd toe 2019 and again 2020). I did treat him when he had this issue and noted then he was noncompliant with my recommendations. I last saw him on 3/9/20 where he had a bulbous left 2nd hammer toe with +MRI for osteo. He never returned as scheduled but ultimately followed through with my prior recs of distal tip amputation.    He came in on 5/19/22 upon his own self referral complaining of a right plantar diabetic foot ulcer on the right great toe that began probably around December 2021. He did not seek treatment at all.  I believe his wife is the one that finally got him to come to the clinic.  He remains an uncontrolled diabetic( hemoglobin A1c April 9.1. I ordered an xray of the right great toe and debrided it and began medihoney dressings and offloaded with posterior wedge Octavio shoe; wife doing wound care; He returned on 6/9/22 with a new ulcer on volar aspect of left 2nd toe. Both are stable; says he tired from [facility]      Review of Systems   Constitutional: Negative for chills and fever.   Neurological: Positive for numbness (to both feet).         Objective:       Vitals:    06/16/22 1330   BP: (!) 133/95   Pulse: 90   Resp: 20   Temp: 98.1 °F (36.7 °C)     Recent Labs   Lab 06/16/22  1326   POCTGLUCOSE 140*          Physical Exam  Constitutional:       Appearance: He is obese.   Eyes:      Conjunctiva/sclera: Conjunctivae normal.   Cardiovascular:      Pulses: Normal pulses.           Dorsalis pedis pulses are 2+ on the right side and 2+ on the left side.      Comments: +hair on legs  Musculoskeletal:      Right lower leg: No edema.      Left lower leg: No edema.        Feet:       Comments: Prior left partial 2nd toe amputation; healed    Neurological:      Mental Status: He is alert.              Wound 05/19/22 1340 Diabetic Ulcer Right medial;plantar Toe, first #1 (Active)   05/19/22 1340    Pre-existing: Yes   Primary Wound Type: Diabetic ulc   Side: Right   Orientation: medial;plantar   Location: Toe, first   Wound Number: #1   Ankle-Brachial Index: Non Compressable   Pulses: Biphasic   Removal Indication and Assessment:    Wound Outcome:    (Retired) Wound Type:    (Retired) Wound Length (cm):    (Retired) Wound Width (cm):    (Retired) Depth (cm):    Wound Description (Comments):    Removal Indications:    Wound Image   06/16/22 1332   Dressing Appearance No dressing 06/16/22 1332   Drainage Amount Moderate 06/16/22 1332   Drainage Characteristics/Odor Yellow;Serosanguineous 06/16/22 1332   Appearance Pink 06/16/22 1332   Tissue loss description Full thickness 06/16/22 1332   Black (%), Wound Tissue Color 0 % 06/16/22 1332   Red (%), Wound Tissue Color 100 % 06/16/22 1332   Yellow (%), Wound Tissue Color 0 % 06/16/22 1332   Periwound Area Intact;Dry 06/16/22 1332   Wound Edges Defined 06/16/22 1332   Wound Length (cm) 0.8 cm 06/16/22 1332   Wound Width (cm) 0.6 cm 06/16/22 1332   Wound Depth (cm) 0.3 cm 06/16/22 1332   Wound Volume (cm^3) 0.144 cm^3 06/16/22 1332   Wound Surface Area (cm^2) 0.48 cm^2 06/16/22 1332   Care Cleansed with:;Soap and water 06/16/22 1332   Dressing Applied 06/16/22 1332   Periwound Care Absorptive dressing applied 06/16/22 1332            Wound 06/09/22 1331 Diabetic Ulcer Left Toe, second #2 (Active)   06/09/22 1331    Pre-existing: Yes   Primary Wound Type: Diabetic ulc   Side: Left   Orientation:    Location: Toe, second   Wound Number: #2   Ankle-Brachial Index:    Pulses:    Removal Indication and Assessment:    Wound Outcome:    (Retired) Wound Type:    (Retired) Wound Length (cm):    (Retired) Wound Width (cm):    (Retired) Depth (cm):    Wound Description (Comments):    Removal Indications:    Wound Image    06/16/22 1332   Dressing Appearance No dressing 06/16/22 1332   Drainage Amount Moderate 06/16/22 1332   Drainage Characteristics/Odor Yellow;Serosanguineous 06/16/22 1332   Appearance Pink 06/16/22 1332   Black (%), Wound Tissue Color 0 % 06/16/22 1332   Red (%), Wound Tissue Color 100 % 06/16/22 1332   Yellow (%), Wound Tissue Color 0 % 06/16/22 1332   Periwound Area Intact;Dry 06/16/22 1332   Wound Edges Defined 06/16/22 1332   Wound Length (cm) 1 cm 06/16/22 1332   Wound Width (cm) 0.7 cm 06/16/22 1332   Wound Depth (cm) 0.3 cm 06/16/22 1332   Wound Volume (cm^3) 0.21 cm^3 06/16/22 1332   Wound Surface Area (cm^2) 0.7 cm^2 06/16/22 1332   Care Cleansed with: 06/16/22 1332   Dressing Applied 06/16/22 1332   Periwound Care Absorptive dressing applied 06/16/22 1332           Assessment:       1. Ulcer of right great toe due to diabetes mellitus    2. Ulcer of left second toe, limited to breakdown of skin    3. Diabetic polyneuropathy associated with type 2 diabetes mellitus    4. Type 2 diabetes mellitus with foot ulcer, without long-term current use of insulin    5. Morbid obesity          ·  Right hallux DFU: first clinic visit 5/19/22, neg xray: stable  · New ulcer left 2nd toe, plantar side: noted 6/9/22  · Uncontrolled diabetes, hba1c 9.1 April 2022  · Prior left 2nd toe DFU with osteomyelitis: 2020  · Morbid obesity with bmi 54  · Hypertension  ·  Hyperlipidemia  · H/o depression/bipolar: on new med: Depakote      Lab Results   Component Value Date    HGBA1C 9.1 (H) 04/27/2021       Plan:           Plan of Care:    1. Right hallux DFU debrided  2. left volar 2nd toe ulcer, stable  3. Can use medihoney on both  4. jojo shoes for both feet for offloading; consider TCC RLE next week  5. Wound Care Orders: dressings of medihoney to be applied to the wound and changed 3x/week and as needed. His wife will do the wound care.  6.  Monitor for any signs of infection: watch for increase drainage or pain, fevers,  chills, swelling and report this to clinic  7. Nutrition: Must have a high protein diet to support wound  healing; (if renal disease, see nephrologist for amount allowed):  this should be over 100g protein /day (if no kidney issues); Also rec MVI along with vit C, vit D, zinc and Sidney  8. Diabetes: he is uncontrolled; he must get this number down; needs to see PCP and follow strict diet  9. Return to clinic 1 week

## 2022-06-17 NOTE — PROCEDURES
"Debridement    Date/Time: 6/16/2022 12:53 PM  Performed by: Soheila Vidal MD  Authorized by: Soheila Vidal MD   Associated wounds:        Wound 05/19/22 1340 Diabetic Ulcer Right medial;plantar Toe, first #1  Time out: Immediately prior to procedure a "time out" was called to verify the correct patient, procedure, equipment, support staff and site/side marked as required.      Preparation: Patient was prepped and draped in usual sterile fashion    Local anesthesia used?: Yes    Local anesthetic:  Lidocaine 2% topical gel    Wound Details:    Location:  Right foot    Location:  Right 1st Toe    Type of Debridement:  Excisional       Length (cm):  0.8       Area (sq cm):  0.48       Width (cm):  0.6       Percent Debrided (%):  100       Depth (cm):  0.3       Total Area Debrided (sq cm):  0.48    Depth of debridement:  Subcutaneous tissue    Tissue debrided:  Dermis, Epidermis and Subcutaneous    Devitalized tissue debrided:  Biofilm and Slough    Instruments:  Curette    Bleeding:  Minimal  Hemostasis Achieved: Yes    Method Used:  Pressure  Patient tolerance:  Patient tolerated the procedure well with no immediate complications      "

## 2022-07-07 ENCOUNTER — HOSPITAL ENCOUNTER (OUTPATIENT)
Dept: WOUND CARE | Facility: HOSPITAL | Age: 52
Discharge: HOME | End: 2022-07-07
Attending: EMERGENCY MEDICINE
Payer: MEDICARE

## 2022-07-07 VITALS
TEMPERATURE: 98 F | WEIGHT: 315 LBS | HEART RATE: 98 BPM | SYSTOLIC BLOOD PRESSURE: 173 MMHG | RESPIRATION RATE: 18 BRPM | HEIGHT: 70 IN | DIASTOLIC BLOOD PRESSURE: 95 MMHG | BODY MASS INDEX: 45.1 KG/M2

## 2022-07-07 DIAGNOSIS — E11.621 ULCER OF RIGHT GREAT TOE DUE TO DIABETES MELLITUS: Primary | ICD-10-CM

## 2022-07-07 DIAGNOSIS — E11.621 DIABETIC ULCER OF TOE OF LEFT FOOT ASSOCIATED WITH TYPE 2 DIABETES MELLITUS, WITH NECROSIS OF BONE: ICD-10-CM

## 2022-07-07 DIAGNOSIS — E11.621 TYPE 2 DIABETES MELLITUS WITH FOOT ULCER, WITHOUT LONG-TERM CURRENT USE OF INSULIN: ICD-10-CM

## 2022-07-07 DIAGNOSIS — L97.519 ULCER OF RIGHT GREAT TOE DUE TO DIABETES MELLITUS: Primary | ICD-10-CM

## 2022-07-07 DIAGNOSIS — L97.509 TYPE 2 DIABETES MELLITUS WITH FOOT ULCER, WITHOUT LONG-TERM CURRENT USE OF INSULIN: ICD-10-CM

## 2022-07-07 DIAGNOSIS — L97.521 ULCER OF LEFT SECOND TOE, LIMITED TO BREAKDOWN OF SKIN: ICD-10-CM

## 2022-07-07 DIAGNOSIS — L97.524 DIABETIC ULCER OF TOE OF LEFT FOOT ASSOCIATED WITH TYPE 2 DIABETES MELLITUS, WITH NECROSIS OF BONE: ICD-10-CM

## 2022-07-07 DIAGNOSIS — E66.01 MORBID OBESITY: ICD-10-CM

## 2022-07-07 DIAGNOSIS — E11.42 DIABETIC POLYNEUROPATHY ASSOCIATED WITH TYPE 2 DIABETES MELLITUS: ICD-10-CM

## 2022-07-07 LAB — POCT GLUCOSE: 273 MG/DL (ref 70–110)

## 2022-07-07 PROCEDURE — 27000999 HC MEDICAL RECORD PHOTO DOCUMENTATION

## 2022-07-07 PROCEDURE — 87184 SC STD DISK METHOD PER PLATE: CPT | Performed by: EMERGENCY MEDICINE

## 2022-07-07 PROCEDURE — 11042 DBRDMT SUBQ TIS 1ST 20SQCM/<: CPT

## 2022-07-07 NOTE — PATIENT INSTRUCTIONS
Pt seen today by:     Home health and self care DRESSING INSTRUCTIONS:      Wound location: Right Great Toe/Left second toe    Dressings to be changed every other day and as needed if soiled or not intact.    Cleanse wound with wound cleanser or saline  Apply promogran to the wound bed  Cover with exudry and secure with a shaka/cover roll tape  Wear Darco shoe    Return to clinic on July 14th at 1:00    Wound may have been debrided in clinic: if so, WHAT YOU NEED TO KNOW:    Debridement is the removal of infected, damaged, or dead tissue so a wound can heal properly. Your wound may need more than one debridement. Debridement can cause bleeding, and a small amount of blood is expected.  AFTER A DEBRIDEMENT:  Keep your wound clean and dry. Do not remove the dressing unless instructed.  Follow the wound care orders provided to you or your home health care provider.  If you have pain, take over the counter pain relievers or pain medication if prescribed.  Elevate the wound and limit excessive activity to prevent bleeding and/or swelling in your wound.  If you see blood coming through the dressing, apply gauze and tape over the dressing and hold firm pressure to the wound with your hand for 5-10 minutes continuously, without peeking, to help the bleeding stop.  Contact [facility] wound care team at [phone] or go to the nearest Emergency department if:  You have a fever greater than 101 taken by mouth.  Your pain gets worse or does not go away, even after taking your regular pain medicine.  Your skin around your wound is red, hot, swollen, or draining pus.  You have bleeding that continues to come through the dressing after holding pressure for 10 minutes   Compression: You may be using a compressive type of dressings to control edema: If so, keep your compression wrap or tubigrip in place. Do not get them wet, they should feel snug. If they feel tight, or cause pain in any way,  elevate your legs above your  heart for 15 minutes. If the wraps still cause pain or you can not tolerate compression,  please remove and notify the clinic or your home health.   Nutrition:  The current daily value (%DV) for protein is 50 grams per day and is meant as a general goal for most people. Further increasing your dietary protein intake is very important for wound healing. Typically one needs over 100g of protein per day to help with wound healing needs.  If you are a dialysis patient or have problems with your kidneys, talk to your Nephrologist about how much protein you can take in with your condition.  Examples of high protein items that can be added to your diet include: eggs, chicken, red meats, almonds, cottage cheese, Greek yogurt, beans, and peanut butter.  Fortified protein bars, shakes and drinks can add 15-30 additional grams of protein per serving.   Also add:   1 daily general multivitamin   Sidney : 1 packet twice daily   Vitamin C : 500mg twice daily   Zinc 220 mg daily  Vit D : once daily    Call our [facility] wound clinic for questions/concerns a [phone] .

## 2022-07-07 NOTE — PROGRESS NOTES
Subjective:       Patient ID: Jong Clayton is a 52 y.o. male.    Chief Complaint: Diabetic Foot Ulcer (Type 2 diabetes mellitus with foot ulcer, without long-term current use of insulin)    52 y.o  obese WM with diabetes, hypertension, dyslipidemia, bipolar and depression along with prior DFU's with osteomyelitis (left 2nd toe 2019 and again 2020). I did treat him when he had this issue and noted then he was noncompliant with my recommendations. I last saw him on 3/9/20 where he had a bulbous left 2nd hammer toe with +MRI for osteo. He never returned as scheduled but ultimately followed through with my prior recs of distal tip amputation.    He came in on 5/19/22 upon his own self referral complaining of a right plantar diabetic foot ulcer on the right great toe that began probably around December 2021. He did not seek treatment at all.  His wife finally got him to come back to this clinic. He remains with uncontrolled DM with last hba1c 9.1 April 2022.  He returned on 6/9/22 but has been very inconsistent with keeping his appointments . He says he offloads as well as uses jojo shoe but the ulcer not improving at all; could be from multitude of reasons but  I am concerned he may have osteo; neg xray back in June but I will order tissue cx today and plant to order MRI as well.      Review of Systems   Constitutional: Negative for chills and fever.   Neurological: Positive for numbness (to both feet).         Objective:       Vitals:    07/07/22 1417   BP: (!) 173/95   Pulse: 98   Resp: 18   Temp: 97.9 °F (36.6 °C)     No results for input(s): POCTGLUCOSE in the last 24 hours.       Physical Exam  Constitutional:       Appearance: He is obese.   Eyes:      Conjunctiva/sclera: Conjunctivae normal.   Cardiovascular:      Pulses: Normal pulses.           Dorsalis pedis pulses are 2+ on the right side and 2+ on the left side.      Comments: +hair on legs  Musculoskeletal:      Right lower leg: No edema.      Left  lower leg: No edema.        Feet:       Comments: Prior left partial 2nd toe amputation; healed   Neurological:      Mental Status: He is alert.              Wound 05/19/22 1340 Diabetic Ulcer Right medial;plantar Toe, first #1 (Active)   05/19/22 1340    Pre-existing: Yes   Primary Wound Type: Diabetic ulc   Side: Right   Orientation: medial;plantar   Location: Toe, first   Wound Number: #1   Ankle-Brachial Index: Non Compressable   Pulses: Biphasic   Removal Indication and Assessment:    Wound Outcome:    (Retired) Wound Type:    (Retired) Wound Length (cm):    (Retired) Wound Width (cm):    (Retired) Depth (cm):    Wound Description (Comments):    Removal Indications:    Wound Image   07/07/22 1430   Dressing Appearance Dry;Intact;Clean 07/07/22 1432   Drainage Amount Moderate 07/07/22 1432   Drainage Characteristics/Odor Serosanguineous;Yellow 07/07/22 1432   Appearance Pink;Yellow 07/07/22 1432   Tissue loss description Full thickness 07/07/22 1432   Black (%), Wound Tissue Color 0 % 07/07/22 1432   Red (%), Wound Tissue Color 90 % 07/07/22 1432   Yellow (%), Wound Tissue Color 10 % 07/07/22 1432   Periwound Area Intact 07/07/22 1432   Wound Edges Callused 07/07/22 1432   Wound Length (cm) 0.8 cm 07/07/22 1432   Wound Width (cm) 0.5 cm 07/07/22 1432   Wound Depth (cm) 0.4 cm 07/07/22 1432   Wound Volume (cm^3) 0.16 cm^3 07/07/22 1432   Wound Surface Area (cm^2) 0.4 cm^2 07/07/22 1432   Care Cleansed with:;Soap and water;Wound cleanser 07/07/22 1432   Dressing Removed;Changed;Collagen;Silver;Absorptive Pad;Rolled gauze 07/07/22 1432            Wound 06/09/22 1331 Diabetic Ulcer Left Toe, second #2 (Active)   06/09/22 1331    Pre-existing: Yes   Primary Wound Type: Diabetic ulc   Side: Left   Orientation:    Location: Toe, second   Wound Number: #2   Ankle-Brachial Index:    Pulses: Doopler biphasic   Removal Indication and Assessment:    Wound Outcome:    (Retired) Wound Type:    (Retired) Wound Length (cm):     (Retired) Wound Width (cm):    (Retired) Depth (cm):    Wound Description (Comments):    Removal Indications:    Wound Image   07/07/22 1430   Dressing Appearance Dry;Intact;Clean 07/07/22 1433   Drainage Amount Moderate 07/07/22 1433   Drainage Characteristics/Odor Serosanguineous;Yellow 07/07/22 1433   Appearance Pink 07/07/22 1433   Tissue loss description Full thickness 07/07/22 1433   Black (%), Wound Tissue Color 0 % 07/07/22 1433   Red (%), Wound Tissue Color 100 % 07/07/22 1433   Yellow (%), Wound Tissue Color 0 % 07/07/22 1433   Periwound Area Intact 07/07/22 1433   Wound Edges Callused 07/07/22 1433   Wound Length (cm) 0.9 cm 07/07/22 1433   Wound Width (cm) 0.6 cm 07/07/22 1433   Wound Depth (cm) 0.2 cm 07/07/22 1433   Wound Volume (cm^3) 0.108 cm^3 07/07/22 1433   Wound Surface Area (cm^2) 0.54 cm^2 07/07/22 1433   Care Cleansed with:;Soap and water;Wound cleanser 07/07/22 1433   Dressing Removed;Changed;Collagen;Silver;Absorptive Pad;Rolled gauze 07/07/22 1433           Assessment:       1. Ulcer of right great toe due to diabetes mellitus    2. Ulcer of left second toe, limited to breakdown of skin    3. Diabetic polyneuropathy associated with type 2 diabetes mellitus    4. Type 2 diabetes mellitus with foot ulcer, without long-term current use of insulin    5. Morbid obesity    6. Diabetic ulcer of toe of left foot associated with type 2 diabetes mellitus, with necrosis of bone          ·  Right hallux DFU: first clinic visit 5/19/22, neg xray: recalcitrant  · New ulcer left 2nd toe, plantar side: noted 6/9/22: stable  · Uncontrolled diabetes, hba1c 9.1 April 2022  · Prior left 2nd toe DFU with osteomyelitis: 2020  · Morbid obesity with bmi 54  · Hypertension  ·  Hyperlipidemia  · H/o depression/bipolar: on new med: Depakote      Lab Results   Component Value Date    HGBA1C 9.1 (H) 04/27/2021       Plan:           Plan of Care:    1. Right hallux DFU debrided  2. I have sent off a tissue cx and plan  to order an MRI to r.o osteo; This ulcer is not getting better so I want to r/o that out before we start TCC    3. Wound Care Orders: dressings of medihoney to be applied to the wound and changed 3x/week and as needed. His wife will do the wound care.  4.  Monitor for any signs of infection: watch for increase drainage or pain, fevers, chills, swelling and report this to clinic  5. Nutrition: Must have a high protein diet to support wound  healing; (if renal disease, see nephrologist for amount allowed):  this should be over 100g protein /day (if no kidney issues); Also rec MVI along with vit C, vit D, zinc and Sidney  6. Diabetes: he is uncontrolled; he must get this number down; He says he recently saw his PCP but they did not even discuss the hba1c  7. Return to clinic 1 week

## 2022-07-10 LAB
BACTERIA SPEC CULT: ABNORMAL
BACTERIA SPEC CULT: ABNORMAL

## 2022-07-11 DIAGNOSIS — M86.179 OTHER ACUTE OSTEOMYELITIS, UNSPECIFIED ANKLE AND FOOT: ICD-10-CM

## 2022-07-11 NOTE — PROCEDURES
"Debridement    Date/Time: 7/7/2022 1:59 PM  Performed by: Soheila Vidal MD  Authorized by: Soheila Vidal MD   Associated wounds:        Wound 05/19/22 1340 Diabetic Ulcer Right medial;plantar Toe, first #1  Time out: Immediately prior to procedure a "time out" was called to verify the correct patient, procedure, equipment, support staff and site/side marked as required.    Consent Done?:  Yes (Written)    Preparation: Patient was prepped and draped in usual sterile fashion    Local anesthesia used?: Yes    Local anesthetic:  Topical anesthetic    Wound Details:    Location:  Right foot    Location:  Right 1st Toe    Type of Debridement:  Excisional       Length (cm):  0.8       Area (sq cm):  0.4       Width (cm):  0.5       Percent Debrided (%):  100       Depth (cm):  0.4       Total Area Debrided (sq cm):  0.4    Depth of debridement:  Subcutaneous tissue    Tissue debrided:  Dermis, Epidermis and Subcutaneous    Devitalized tissue debrided:  Biofilm, Callus and Slough    Instruments:  Curette    Bleeding:  Minimal  Hemostasis Achieved: Yes    Method Used:  Pressure  Patient tolerance:  Patient tolerated the procedure well with no immediate complications      "

## 2022-07-13 ENCOUNTER — LAB VISIT (OUTPATIENT)
Dept: LAB | Facility: HOSPITAL | Age: 52
End: 2022-07-13
Attending: EMERGENCY MEDICINE
Payer: MEDICARE

## 2022-07-13 DIAGNOSIS — L97.519 ULCER OF RIGHT GREAT TOE DUE TO DIABETES MELLITUS: ICD-10-CM

## 2022-07-13 DIAGNOSIS — N18.6 TYPE 2 DIABETES MELLITUS WITH END-STAGE RENAL DISEASE: ICD-10-CM

## 2022-07-13 DIAGNOSIS — L97.509 DIABETIC FOOT ULCER: ICD-10-CM

## 2022-07-13 DIAGNOSIS — E11.621 DIABETIC ULCER OF TOE OF LEFT FOOT ASSOCIATED WITH TYPE 2 DIABETES MELLITUS, WITH NECROSIS OF BONE: ICD-10-CM

## 2022-07-13 DIAGNOSIS — E11.621 ULCER OF RIGHT GREAT TOE DUE TO DIABETES MELLITUS: ICD-10-CM

## 2022-07-13 DIAGNOSIS — I10 ESSENTIAL HYPERTENSION, BENIGN: Primary | ICD-10-CM

## 2022-07-13 DIAGNOSIS — L97.524 DIABETIC ULCER OF TOE OF LEFT FOOT ASSOCIATED WITH TYPE 2 DIABETES MELLITUS, WITH NECROSIS OF BONE: ICD-10-CM

## 2022-07-13 DIAGNOSIS — E11.621 DIABETIC FOOT ULCER: ICD-10-CM

## 2022-07-13 DIAGNOSIS — E66.01 MORBID OBESITY: ICD-10-CM

## 2022-07-13 DIAGNOSIS — E78.2 MIXED HYPERLIPIDEMIA: ICD-10-CM

## 2022-07-13 DIAGNOSIS — L97.509 TYPE 2 DIABETES MELLITUS WITH FOOT ULCER, WITHOUT LONG-TERM CURRENT USE OF INSULIN: ICD-10-CM

## 2022-07-13 DIAGNOSIS — E11.22 TYPE 2 DIABETES MELLITUS WITH END-STAGE RENAL DISEASE: ICD-10-CM

## 2022-07-13 DIAGNOSIS — E11.621 TYPE 2 DIABETES MELLITUS WITH FOOT ULCER, WITHOUT LONG-TERM CURRENT USE OF INSULIN: ICD-10-CM

## 2022-07-13 LAB
ALBUMIN SERPL-MCNC: 3.4 GM/DL (ref 3.5–5)
ALBUMIN/GLOB SERPL: 1 RATIO (ref 1.1–2)
ALP SERPL-CCNC: 69 UNIT/L (ref 40–150)
ALT SERPL-CCNC: 14 UNIT/L (ref 0–55)
AST SERPL-CCNC: 12 UNIT/L (ref 5–34)
BILIRUBIN DIRECT+TOT PNL SERPL-MCNC: 0.2 MG/DL
BUN SERPL-MCNC: 19.1 MG/DL (ref 8.4–25.7)
CALCIUM SERPL-MCNC: 9.6 MG/DL (ref 8.4–10.2)
CHLORIDE SERPL-SCNC: 102 MMOL/L (ref 98–107)
CO2 SERPL-SCNC: 27 MMOL/L (ref 22–29)
CREAT SERPL-MCNC: 1.05 MG/DL (ref 0.73–1.18)
CRP SERPL-MCNC: 13 MG/L
ERYTHROCYTE [DISTWIDTH] IN BLOOD BY AUTOMATED COUNT: 13.9 % (ref 11.5–17)
ERYTHROCYTE [SEDIMENTATION RATE] IN BLOOD: 11 MM/HR (ref 0–15)
EST. AVERAGE GLUCOSE BLD GHB EST-MCNC: 162.8 MG/DL
GLOBULIN SER-MCNC: 3.4 GM/DL (ref 2.4–3.5)
GLUCOSE SERPL-MCNC: 171 MG/DL (ref 74–100)
HBA1C MFR BLD: 7.3 %
HCT VFR BLD AUTO: 37.3 % (ref 42–52)
HGB BLD-MCNC: 11.6 GM/DL (ref 14–18)
MCH RBC QN AUTO: 30.1 PG (ref 27–31)
MCHC RBC AUTO-ENTMCNC: 31.1 MG/DL (ref 33–36)
MCV RBC AUTO: 96.9 FL (ref 80–94)
NRBC BLD AUTO-RTO: 0 %
PLATELET # BLD AUTO: 313 X10(3)/MCL (ref 130–400)
PMV BLD AUTO: 10.8 FL (ref 7.4–10.4)
POTASSIUM SERPL-SCNC: 5.1 MMOL/L (ref 3.5–5.1)
PROT SERPL-MCNC: 6.8 GM/DL (ref 6.4–8.3)
RBC # BLD AUTO: 3.85 X10(6)/MCL (ref 4.7–6.1)
SODIUM SERPL-SCNC: 142 MMOL/L (ref 136–145)
WBC # SPEC AUTO: 8.6 X10(3)/MCL (ref 4.5–11.5)

## 2022-07-13 PROCEDURE — 85651 RBC SED RATE NONAUTOMATED: CPT

## 2022-07-13 PROCEDURE — 80053 COMPREHEN METABOLIC PANEL: CPT

## 2022-07-13 PROCEDURE — 85027 COMPLETE CBC AUTOMATED: CPT

## 2022-07-13 PROCEDURE — 86140 C-REACTIVE PROTEIN: CPT

## 2022-07-13 PROCEDURE — 36415 COLL VENOUS BLD VENIPUNCTURE: CPT

## 2022-07-13 PROCEDURE — 83036 HEMOGLOBIN GLYCOSYLATED A1C: CPT

## 2022-07-14 ENCOUNTER — HOSPITAL ENCOUNTER (OUTPATIENT)
Dept: WOUND CARE | Facility: HOSPITAL | Age: 52
Discharge: HOME | End: 2022-07-14
Attending: EMERGENCY MEDICINE
Payer: MEDICARE

## 2022-07-14 VITALS
WEIGHT: 315 LBS | BODY MASS INDEX: 45.1 KG/M2 | RESPIRATION RATE: 18 BRPM | HEART RATE: 80 BPM | SYSTOLIC BLOOD PRESSURE: 164 MMHG | TEMPERATURE: 98 F | DIASTOLIC BLOOD PRESSURE: 90 MMHG | HEIGHT: 70 IN

## 2022-07-14 DIAGNOSIS — E66.01 MORBID OBESITY: ICD-10-CM

## 2022-07-14 DIAGNOSIS — L97.509 TYPE 2 DIABETES MELLITUS WITH FOOT ULCER, WITHOUT LONG-TERM CURRENT USE OF INSULIN: ICD-10-CM

## 2022-07-14 DIAGNOSIS — E11.621 ULCER OF RIGHT GREAT TOE DUE TO DIABETES MELLITUS: Primary | ICD-10-CM

## 2022-07-14 DIAGNOSIS — L97.519 ULCER OF RIGHT GREAT TOE DUE TO DIABETES MELLITUS: Primary | ICD-10-CM

## 2022-07-14 DIAGNOSIS — A49.01 STAPH AUREUS INFECTION: ICD-10-CM

## 2022-07-14 DIAGNOSIS — E11.621 TYPE 2 DIABETES MELLITUS WITH FOOT ULCER, WITHOUT LONG-TERM CURRENT USE OF INSULIN: ICD-10-CM

## 2022-07-14 DIAGNOSIS — A49.1 GROUP B STREPTOCOCCAL INFECTION: ICD-10-CM

## 2022-07-14 DIAGNOSIS — L97.521 ULCER OF LEFT SECOND TOE, LIMITED TO BREAKDOWN OF SKIN: ICD-10-CM

## 2022-07-14 DIAGNOSIS — E11.42 DIABETIC POLYNEUROPATHY ASSOCIATED WITH TYPE 2 DIABETES MELLITUS: ICD-10-CM

## 2022-07-14 LAB — POCT GLUCOSE: 147 MG/DL (ref 70–110)

## 2022-07-14 PROCEDURE — 27000999 HC MEDICAL RECORD PHOTO DOCUMENTATION

## 2022-07-14 PROCEDURE — 11042 DBRDMT SUBQ TIS 1ST 20SQCM/<: CPT

## 2022-07-14 RX ORDER — DOXYCYCLINE HYCLATE 100 MG
100 TABLET ORAL 2 TIMES DAILY WITH MEALS
Qty: 28 TABLET | Refills: 0 | Status: SHIPPED | OUTPATIENT
Start: 2022-07-14 | End: 2022-07-28

## 2022-07-14 RX ORDER — AMOXICILLIN AND CLAVULANATE POTASSIUM 875; 125 MG/1; MG/1
1 TABLET, FILM COATED ORAL 2 TIMES DAILY
Qty: 14 TABLET | Refills: 0 | Status: SHIPPED | OUTPATIENT
Start: 2022-07-14 | End: 2022-07-21

## 2022-07-14 NOTE — PROGRESS NOTES
Subjective:       Patient ID: Jong Clayton is a 52 y.o. male.    Chief Complaint: Diabetic Foot Ulcer (Type 2 diabetes mellitus with foot ulcer, without long-term current use of insulin)    52 y.o  obese WM with diabetes, hypertension, dyslipidemia, bipolar and depression along with prior DFU's with osteomyelitis (left 2nd toe 2019 and again 2020). I did treat him when he had this issue and noted then he was noncompliant with my recommendations. I last saw him on 3/9/20 where he had a bulbous left 2nd hammer toe with +MRI for osteo. He never returned as scheduled but ultimately followed through with my prior recs of distal tip amputation.    He came in on 5/19/22 upon his own self referral complaining of a right plantar diabetic foot ulcer on the right great toe that began probably around December 2021. He did not seek treatment at all.  His wife finally got him to come back to this clinic. He remains with uncontrolled DM with last hba1c 9.1 April 2022.  He returned on 6/9/22 but has been very inconsistent with keeping his appointments . He says he offloads as well as uses jojo shoe but the ulcer not improving at all; could be from multitude of reasons but  I am concerned he may have osteo; neg xray back in June but have I ordered an mRI and also did a cx last week: see results.      Review of Systems   Constitutional: Negative for chills and fever.   Neurological: Positive for numbness (to both feet).         Objective:       Vitals:    07/14/22 1311   BP: (!) 164/90   Pulse: 80   Resp: 18   Temp: 97.8 °F (36.6 °C)     Recent Labs   Lab 07/14/22  1315   POCTGLUCOSE 147*          Physical Exam  Constitutional:       Appearance: He is obese.   Eyes:      Conjunctiva/sclera: Conjunctivae normal.   Cardiovascular:      Pulses: Normal pulses.           Dorsalis pedis pulses are 2+ on the right side and 2+ on the left side.      Comments: +hair on legs  Musculoskeletal:      Right lower leg: No edema.      Left lower  leg: No edema.        Feet:       Comments: Prior left partial 2nd toe amputation; healed   Neurological:      Mental Status: He is alert.              Wound 05/19/22 1340 Diabetic Ulcer Right medial;plantar Toe, first #1 (Active)   05/19/22 1340    Pre-existing: Yes   Primary Wound Type: Diabetic ulc   Side: Right   Orientation: medial;plantar   Location: Toe, first   Wound Number: #1   Ankle-Brachial Index: Non Compressable 7/14/22   Pulses: palpable/ doopler biphasic   Removal Indication and Assessment:    Wound Outcome:    (Retired) Wound Type:    (Retired) Wound Length (cm):    (Retired) Wound Width (cm):    (Retired) Depth (cm):    Wound Description (Comments):    Removal Indications:    Wound Image   07/14/22 1323   Dressing Appearance Intact;Dry 07/14/22 1320   Drainage Amount Moderate 07/14/22 1320   Drainage Characteristics/Odor Yellow;Serosanguineous 07/14/22 1320   Appearance Pink;Yellow 07/14/22 1320   Tissue loss description Full thickness 07/14/22 1320   Black (%), Wound Tissue Color 0 % 07/14/22 1320   Red (%), Wound Tissue Color 80 % 07/14/22 1320   Yellow (%), Wound Tissue Color 20 % 07/14/22 1320   Periwound Area Intact 07/14/22 1320   Wound Edges Defined 07/14/22 1320   Wound Length (cm) 1 cm 07/14/22 1320   Wound Width (cm) 0.7 cm 07/14/22 1320   Wound Depth (cm) 0.3 cm 07/14/22 1320   Wound Volume (cm^3) 0.21 cm^3 07/14/22 1320   Wound Surface Area (cm^2) 0.7 cm^2 07/14/22 1320   Care Cleansed with:;Soap and water;Wound cleanser 07/14/22 1320   Dressing Removed;Changed;Collagen;Silver;Absorptive Pad;Tubular bandage 07/14/22 1320   Off Loading Off loading shoe 07/14/22 1320            Wound 06/09/22 1331 Diabetic Ulcer Left Toe, second #2 (Active)   06/09/22 1331    Pre-existing: Yes   Primary Wound Type: Diabetic ulc   Side: Left   Orientation:    Location: Toe, second   Wound Number: #2   Ankle-Brachial Index: 7/14/22 non compressable   Pulses: Doopler biphasic/ palpable   Removal Indication  and Assessment:    Wound Outcome:    (Retired) Wound Type:    (Retired) Wound Length (cm):    (Retired) Wound Width (cm):    (Retired) Depth (cm):    Wound Description (Comments):    Removal Indications:    Wound Image   07/14/22 1323   Dressing Appearance Dry;Intact 07/14/22 1320   Drainage Amount Moderate 07/14/22 1320   Drainage Characteristics/Odor Yellow;Serosanguineous 07/14/22 1320   Appearance Red;Yellow 07/14/22 1320   Tissue loss description Full thickness 07/14/22 1320   Black (%), Wound Tissue Color 0 % 07/14/22 1320   Red (%), Wound Tissue Color 80 % 07/14/22 1320   Yellow (%), Wound Tissue Color 20 % 07/14/22 1320   Periwound Area Intact 07/14/22 1320   Wound Edges Defined 07/14/22 1320   Wound Length (cm) 0.8 cm 07/14/22 1320   Wound Width (cm) 6 cm 07/14/22 1320   Wound Depth (cm) 1 cm 07/14/22 1320   Wound Volume (cm^3) 4.8 cm^3 07/14/22 1320   Wound Surface Area (cm^2) 4.8 cm^2 07/14/22 1320   Care Cleansed with:;Soap and water;Wound cleanser 07/14/22 1320   Dressing Removed;Changed;Collagen;Silver;Absorptive Pad;Rolled gauze 07/14/22 1320   Off Loading Off loading shoe 07/14/22 1320           Assessment:       1. Ulcer of right great toe due to diabetes mellitus    2. Staph aureus infection    3. Group B streptococcal infection    4. Ulcer of left second toe, limited to breakdown of skin    5. Diabetic polyneuropathy associated with type 2 diabetes mellitus    6. Type 2 diabetes mellitus with foot ulcer, without long-term current use of insulin    7. Morbid obesity          ·  Right hallux DFU: first clinic visit 5/19/22, neg xray: recalcitrant  · cx 7/7/22:        0 Result Notes    Aerobic Culture - Tissue Many Streptococcus agalactiae (Group B) Abnormal        Many Methicillin Sensitive Staphylococcus aureus Abnormal             Resulting Agency: [facility]       Susceptibility     Streptococcus agalactiae (Group B) Methicillin Sensitive Staphylococcus aureus     Not Specified Not Specified      Ampicillin <=0.25  Sensitive       Clindamycin >=1  Resistant <=0.25  Sensitive     Erythromycin >=8  Resistant <=0.25  Sensitive     Gentamicin   <=0.5  Sensitive     Moxifloxacin 0.12  Sensitive       Oxacillin   <=0.25  Sensitive     Penicillin <=0.06  Sensitive >=0.5  Resistant     Tetracycline >=16  Resistant <=1  Sensitive     Trimethoprim/Sulfamethoxazole   <=10  Sensitive     Vancomycin 0.5  Sensitive <=0.5  Sensitive                         · New ulcer left 2nd toe, plantar side: noted 6/9/22: stable  · Uncontrolled diabetes, hba1c 9.1 April 2022  · Prior left 2nd toe DFU with osteomyelitis: 2020  · Morbid obesity with bmi 54  · Hypertension  ·  Hyperlipidemia  · H/o depression/bipolar: on new med: Depakote      Lab Results   Component Value Date    HGBA1C 7.3 (H) 07/13/2022     CMP  Sodium Level   Date Value Ref Range Status   07/13/2022 142 136 - 145 mmol/L Final     Potassium Level   Date Value Ref Range Status   07/13/2022 5.1 3.5 - 5.1 mmol/L Final     Carbon Dioxide   Date Value Ref Range Status   07/13/2022 27 22 - 29 mmol/L Final     Blood Urea Nitrogen   Date Value Ref Range Status   07/13/2022 19.1 8.4 - 25.7 mg/dL Final     Creatinine   Date Value Ref Range Status   07/13/2022 1.05 0.73 - 1.18 mg/dL Final     Calcium Level Total   Date Value Ref Range Status   07/13/2022 9.6 8.4 - 10.2 mg/dL Final     Albumin Level   Date Value Ref Range Status   07/13/2022 3.4 (L) 3.5 - 5.0 gm/dL Final     Bilirubin Total   Date Value Ref Range Status   07/13/2022 0.2 <=1.5 mg/dL Final     Alkaline Phosphatase   Date Value Ref Range Status   07/13/2022 69 40 - 150 unit/L Final     Aspartate Aminotransferase   Date Value Ref Range Status   07/13/2022 12 5 - 34 unit/L Final     Alanine Aminotransferase   Date Value Ref Range Status   07/13/2022 14 0 - 55 unit/L Final     Estimated GFR-Non    Date Value Ref Range Status   07/13/2022 >60 mls/min/1.73/m2 Final     Lab Results   Component Value Date     WBC 8.6 07/13/2022    HGB 11.6 (L) 07/13/2022    HCT 37.3 (L) 07/13/2022    MCV 96.9 (H) 07/13/2022     07/13/2022 7/13/22: Sed rate 11/crp 13      Plan:           Plan of Care:    1. Right hallux DFU debrided  2. +cx from 7/7/22: plan oral augmentin and doxy for now but will adjust after MRI and may do IV antibiotics  3. Await MRI: I am concerned about right distal phalanx osteomyelitis; pt to get this scheduled  4. Wound Care Orders: dressings of collagen to be applied to the wound and changed 3x/week and as needed. His wife will do the wound care.  5.  Monitor for any signs of infection: watch for increase drainage or pain, fevers, chills, swelling and report this to clinic  6. Nutrition: Must have a high protein diet to support wound  healing; (if renal disease, see nephrologist for amount allowed):  this should be over 100g protein /day (if no kidney issues); Also rec MVI along with vit C, vit D, zinc and Sidney  7. Diabetes: recent a1c down a point to under 8!  8. Continue offloading with jojo for now;he says he doesn't get up much; rec rolling knee scooter  9. Return to clinic 1 week

## 2022-07-14 NOTE — PATIENT INSTRUCTIONS
Pt seen today by:     Home health and self care DRESSING INSTRUCTIONS:      Wound location: Right Great Toe/Left second toe    Dressings to be changed every other day and as needed if soiled or not intact.    We will order regranex if you receive it apply it the left toe only under the promogran    Cleanse wound with wound cleanser or saline  Apply promogran to the wound bed  Cover with exudry and secure with a shaka/cover roll tape  Wear Darco shoe    Return to clinic on July 21st at 1:00  7/15/22 Regranex was ordered for patient    Wound may have been debrided in clinic: if so, WHAT YOU NEED TO KNOW:    Debridement is the removal of infected, damaged, or dead tissue so a wound can heal properly. Your wound may need more than one debridement. Debridement can cause bleeding, and a small amount of blood is expected.  AFTER A DEBRIDEMENT:  Keep your wound clean and dry. Do not remove the dressing unless instructed.  Follow the wound care orders provided to you or your home health care provider.  If you have pain, take over the counter pain relievers or pain medication if prescribed.  Elevate the wound and limit excessive activity to prevent bleeding and/or swelling in your wound.  If you see blood coming through the dressing, apply gauze and tape over the dressing and hold firm pressure to the wound with your hand for 5-10 minutes continuously, without peeking, to help the bleeding stop.  Contact [facility] wound care team at [phone] or go to the nearest Emergency department if:  You have a fever greater than 101 taken by mouth.  Your pain gets worse or does not go away, even after taking your regular pain medicine.  Your skin around your wound is red, hot, swollen, or draining pus.  You have bleeding that continues to come through the dressing after holding pressure for 10 minutes   The current daily value (%DV) for protein is 50 grams per day and is meant as a general goal for most people. Further  increasing your dietary protein intake is very important for wound healing. Typically one needs over 100g of protein per day to help with wound healing needs.  If you are a dialysis patient or have problems with your kidneys, talk to your Nephrologist about how much protein you can take in with your condition.  Examples of high protein items that can be added to your diet include: eggs, chicken, red meats, almonds, cottage cheese, Greek yogurt, beans, and peanut butter.  Fortified protein bars, shakes and drinks can add 15-30 additional grams of protein per serving.   Also add:   1 daily general multivitamin   Sidney : 1 packet twice daily   Vitamin C : 500mg twice daily   Zinc 220 mg daily  Vit D : once daily    Call our [facility] wound clinic for questions/concerns a [phone] .

## 2022-07-15 NOTE — PROGRESS NOTES
Pt called Friday 07/15/2022 to clarify wound care orders: Nurse clarified orders as follows: Regranex is to be put on right toe only, Pt to take oral antibiotics as ordered for Left toe. Pt verbalized understanding

## 2022-07-15 NOTE — PROCEDURES
"Debridement    Date/Time: 7/14/2022 1:00 PM  Performed by: Soheila Vidal MD  Authorized by: Soheila Vidal MD   Associated wounds:        Wound 05/19/22 1340 Diabetic Ulcer Right medial;plantar Toe, first #1  Time out: Immediately prior to procedure a "time out" was called to verify the correct patient, procedure, equipment, support staff and site/side marked as required.    Consent Done?:  Yes (Written)    Preparation: Patient was prepped and draped in usual sterile fashion    Local anesthesia used?: Yes    Local anesthetic:  Topical anesthetic    Wound Details:    Location:  Right foot    Location:  Right 1st Toe    Type of Debridement:  Excisional       Length (cm):  1       Area (sq cm):  0.7       Width (cm):  0.7       Percent Debrided (%):  100       Depth (cm):  0.3       Total Area Debrided (sq cm):  0.7    Depth of debridement:  Subcutaneous tissue    Tissue debrided:  Dermis, Epidermis and Subcutaneous    Devitalized tissue debrided:  Biofilm and Slough    Instruments:  Curette    Bleeding:  Minimal  Hemostasis Achieved: Yes    Method Used:  Pressure  Patient tolerance:  Patient tolerated the procedure well with no immediate complications      "

## 2022-07-20 ENCOUNTER — HOSPITAL ENCOUNTER (OUTPATIENT)
Dept: RADIOLOGY | Facility: HOSPITAL | Age: 52
Discharge: HOME | End: 2022-07-20
Attending: EMERGENCY MEDICINE
Payer: MEDICARE

## 2022-07-20 DIAGNOSIS — M86.179 OTHER ACUTE OSTEOMYELITIS, UNSPECIFIED ANKLE AND FOOT: ICD-10-CM

## 2022-07-20 PROCEDURE — A9577 INJ MULTIHANCE: HCPCS | Performed by: EMERGENCY MEDICINE

## 2022-07-20 PROCEDURE — 25500020 PHARM REV CODE 255: Performed by: EMERGENCY MEDICINE

## 2022-07-20 PROCEDURE — 73720 MRI LWR EXTREMITY W/O&W/DYE: CPT | Mod: TC,LT

## 2022-07-20 RX ADMIN — GADOBENATE DIMEGLUMINE 15 ML: 529 INJECTION, SOLUTION INTRAVENOUS at 10:07

## 2022-08-04 ENCOUNTER — TELEPHONE (OUTPATIENT)
Dept: WOUND CARE | Facility: HOSPITAL | Age: 52
End: 2022-08-04
Payer: MEDICARE

## 2022-08-04 NOTE — TELEPHONE ENCOUNTER
Spoke with pt regarding supply order, Pt staed that he received his supply order. He did not get Regranex due to copay being 700 $

## 2022-08-05 ENCOUNTER — HOSPITAL ENCOUNTER (OUTPATIENT)
Dept: WOUND CARE | Facility: HOSPITAL | Age: 52
Discharge: HOME | End: 2022-08-05
Attending: EMERGENCY MEDICINE
Payer: MEDICARE

## 2022-08-05 VITALS
SYSTOLIC BLOOD PRESSURE: 133 MMHG | HEART RATE: 69 BPM | WEIGHT: 315 LBS | HEIGHT: 70 IN | RESPIRATION RATE: 18 BRPM | BODY MASS INDEX: 45.1 KG/M2 | TEMPERATURE: 98 F | DIASTOLIC BLOOD PRESSURE: 71 MMHG

## 2022-08-05 DIAGNOSIS — F31.81 BIPOLAR II DISORDER: ICD-10-CM

## 2022-08-05 DIAGNOSIS — E11.621 DIABETIC ULCER OF TOE OF LEFT FOOT ASSOCIATED WITH TYPE 2 DIABETES MELLITUS, WITH NECROSIS OF BONE: ICD-10-CM

## 2022-08-05 DIAGNOSIS — A49.1 GROUP B STREPTOCOCCAL INFECTION: ICD-10-CM

## 2022-08-05 DIAGNOSIS — E11.621 ULCER OF RIGHT GREAT TOE DUE TO DIABETES MELLITUS: Primary | ICD-10-CM

## 2022-08-05 DIAGNOSIS — L97.521 ULCER OF LEFT SECOND TOE, LIMITED TO BREAKDOWN OF SKIN: ICD-10-CM

## 2022-08-05 DIAGNOSIS — L97.509 TYPE 2 DIABETES MELLITUS WITH FOOT ULCER, WITHOUT LONG-TERM CURRENT USE OF INSULIN: ICD-10-CM

## 2022-08-05 DIAGNOSIS — E66.01 MORBID OBESITY: ICD-10-CM

## 2022-08-05 DIAGNOSIS — M86.179 OTHER ACUTE OSTEOMYELITIS, UNSPECIFIED ANKLE AND FOOT: ICD-10-CM

## 2022-08-05 DIAGNOSIS — A49.01 STAPH AUREUS INFECTION: ICD-10-CM

## 2022-08-05 DIAGNOSIS — I10 ESSENTIAL HYPERTENSION, BENIGN: ICD-10-CM

## 2022-08-05 DIAGNOSIS — E11.621 TYPE 2 DIABETES MELLITUS WITH FOOT ULCER, WITHOUT LONG-TERM CURRENT USE OF INSULIN: ICD-10-CM

## 2022-08-05 DIAGNOSIS — L97.524 DIABETIC ULCER OF TOE OF LEFT FOOT ASSOCIATED WITH TYPE 2 DIABETES MELLITUS, WITH NECROSIS OF BONE: ICD-10-CM

## 2022-08-05 DIAGNOSIS — L97.519 ULCER OF RIGHT GREAT TOE DUE TO DIABETES MELLITUS: Primary | ICD-10-CM

## 2022-08-05 DIAGNOSIS — E11.42 DIABETIC POLYNEUROPATHY ASSOCIATED WITH TYPE 2 DIABETES MELLITUS: ICD-10-CM

## 2022-08-05 PROCEDURE — 11042 DBRDMT SUBQ TIS 1ST 20SQCM/<: CPT

## 2022-08-05 PROCEDURE — 27000999 HC MEDICAL RECORD PHOTO DOCUMENTATION

## 2022-08-05 RX ORDER — CARVEDILOL 25 MG/1
TABLET ORAL
COMMUNITY
Start: 2022-07-11

## 2022-08-05 NOTE — PROGRESS NOTES
Subjective:       Patient ID: Jong Clayton is a 52 y.o. male.    Chief Complaint: Diabetic Foot Ulcer    52 year old morbidly obese WM with diabetes, hypertension, dyslipidemia, bipolar and depression along with prior DFU's with osteomyelitis (left 2nd toe 2019 and again 2020). I did treat him when he had this issue and noted then he was noncompliant with my recommendations. I last saw him for that left 2nd toe on 3/9/20 where he had a bulbous left 2nd hammer toe with +MRI for osteo. He never returned as scheduled but ultimately followed through with my prior recs of distal tip amputation.    He returned to clinic on  5/19/22 complaining of a chronic right plantar great toe ulcer present since Dec 2021 that he tried to self treat. His wife finally got  Him to return to clinic but unfortunately he has remained extremely inconsistent with coming to these wound care appointments and ulcer not getting better.  I told him I was extremely concerned about osteomyelitis on the right great toe despite having a negative x-ray.  I ordered an MRI . He has been given antibiotics for +tissue cx for MSSA and streptococcus on 7/7/22 for which I put him on oral augmentin and doxy while we waited for MRI  In the meantime, he developed another volar toe ulcer on left 2nd toe (toe with prior tip amputation/osteo) in early June 2022.    MRI was done on this left foot instead of the right foot but it still shows osteomyelitis of the remaining middle phalanx.  Pt has failed to come to several scheduled visits here but finally returns on 8/5/22. He has not had MRI of right foot yet. Both ulcers though do look better today. CBG almost 400. He was seeing a friend who is a PA for URI: him and wife neg for covid but both received rocephin IM this morning and they have a rx at pharmacy for levaquin.      Review of Systems   Constitutional: Negative for chills and fever.   Neurological: Positive for numbness (to both feet).         Objective:        Vitals:    08/05/22 1118   BP: 133/71   Pulse: 69   Resp: 18   Temp: 98.1 °F (36.7 °C)     cbg almost 400     Physical Exam  Constitutional:       Appearance: He is obese.   Eyes:      Conjunctiva/sclera: Conjunctivae normal.   Cardiovascular:      Pulses: Normal pulses.           Dorsalis pedis pulses are 2+ on the right side and 2+ on the left side.      Comments: +hair on legs  Musculoskeletal:      Right lower leg: No edema.      Left lower leg: No edema.        Feet:    Neurological:      Mental Status: He is alert.              Wound 05/19/22 1340 Diabetic Ulcer Right medial;plantar Toe, first #1 (Active)   05/19/22 1340    Pre-existing: Yes   Primary Wound Type: Diabetic ulc   Side: Right   Orientation: medial;plantar   Location: Toe, first   Wound Number: #1   Ankle-Brachial Index: Non Compressable 7/14/22   Pulses: palpable/ doopler biphasic   Removal Indication and Assessment:    Wound Outcome:    (Retired) Wound Type:    (Retired) Wound Length (cm):    (Retired) Wound Width (cm):    (Retired) Depth (cm):    Wound Description (Comments):    Removal Indications:    Wound Image   08/05/22 1123   Dressing Appearance Moist drainage 08/05/22 1123   Drainage Amount Moderate 08/05/22 1123   Drainage Characteristics/Odor Serosanguineous 08/05/22 1123   Appearance Pink 08/05/22 1123   Tissue loss description Full thickness 08/05/22 1123   Black (%), Wound Tissue Color 0 % 08/05/22 1123   Red (%), Wound Tissue Color 100 % 08/05/22 1123   Yellow (%), Wound Tissue Color 0 % 08/05/22 1123   Periwound Area Intact 08/05/22 1123   Wound Edges Defined 08/05/22 1123   Wound Length (cm) 0.3 cm 08/05/22 1123   Wound Width (cm) 0.3 cm 08/05/22 1123   Wound Depth (cm) 0.1 cm 08/05/22 1123   Wound Volume (cm^3) 0.009 cm^3 08/05/22 1123   Wound Surface Area (cm^2) 0.09 cm^2 08/05/22 1123   Care Cleansed with:;Wound cleanser 08/05/22 1123   Dressing Applied;Collagen;Silver;Absorptive Pad;Rolled gauze 08/05/22 1127             Wound 06/09/22 1331 Diabetic Ulcer Left Toe, second #2 (Active)   06/09/22 1331    Pre-existing: Yes   Primary Wound Type: Diabetic ulc   Side: Left   Orientation:    Location: Toe, second   Wound Number: #2   Ankle-Brachial Index: 7/14/22 non compressable   Pulses: Doopler biphasic/ palpable   Removal Indication and Assessment:    Wound Outcome:    (Retired) Wound Type:    (Retired) Wound Length (cm):    (Retired) Wound Width (cm):    (Retired) Depth (cm):    Wound Description (Comments):    Removal Indications:    Wound Image   08/05/22 1124   Dressing Appearance Moist drainage 08/05/22 1124   Drainage Amount Moderate 08/05/22 1124   Drainage Characteristics/Odor Serosanguineous 08/05/22 1124   Appearance Intact;Pink 08/05/22 1124   Tissue loss description Full thickness 08/05/22 1124   Black (%), Wound Tissue Color 0 % 08/05/22 1124   Red (%), Wound Tissue Color 100 % 08/05/22 1124   Yellow (%), Wound Tissue Color 0 % 08/05/22 1124   Periwound Area Intact 08/05/22 1124   Wound Edges Defined 08/05/22 1124   Wound Length (cm) 0.3 cm 08/05/22 1124   Wound Width (cm) 0.3 cm 08/05/22 1124   Wound Depth (cm) 0.1 cm 08/05/22 1124   Wound Volume (cm^3) 0.009 cm^3 08/05/22 1124   Wound Surface Area (cm^2) 0.09 cm^2 08/05/22 1124   Care Cleansed with:;Wound cleanser 08/05/22 1124   Dressing Applied;Collagen;Silver;Absorptive Pad;Rolled gauze 08/05/22 1124           Assessment:       1. Ulcer of right great toe due to diabetes mellitus    2. Staph aureus infection    3. Group B streptococcal infection    4. Other acute osteomyelitis, unspecified ankle and foot    5. Ulcer of left second toe, limited to breakdown of skin    6. Diabetic polyneuropathy associated with type 2 diabetes mellitus    7. Type 2 diabetes mellitus with foot ulcer, without long-term current use of insulin    8. Morbid obesity    9. Diabetic ulcer of toe of left foot associated with type 2 diabetes mellitus, with necrosis of bone    10. Essential  hypertension, benign    11. Bipolar II disorder          ·  Right hallux DFU: first clinic visit 5/19/22, neg xray: recalcitrant  · cx 7/7/22:       0 Result Notes    Aerobic Culture - Tissue Many Streptococcus agalactiae (Group B) Abnormal        Many Methicillin Sensitive Staphylococcus aureus Abnormal             Resulting Agency: [facility]       Susceptibility     Streptococcus agalactiae (Group B) Methicillin Sensitive Staphylococcus aureus     Not Specified Not Specified     Ampicillin <=0.25  Sensitive       Clindamycin >=1  Resistant <=0.25  Sensitive     Erythromycin >=8  Resistant <=0.25  Sensitive     Gentamicin   <=0.5  Sensitive     Moxifloxacin 0.12  Sensitive       Oxacillin   <=0.25  Sensitive     Penicillin <=0.06  Sensitive >=0.5  Resistant     Tetracycline >=16  Resistant <=1  Sensitive     Trimethoprim/Sulfamethoxazole   <=10  Sensitive     Vancomycin 0.5  Sensitive <=0.5  Sensitive                         · Prior left 2nd toe osteomyelitis with partial amputation by Dr CAMERON driscoll 2020, New ulcer left 2nd toe volar side: noted 6/9/22:better  ·  diabetes, hba1c 9.1 April 2022, down to 7.3 July 20222  · Morbid obesity with bmi 54  · Hypertension  ·  Hyperlipidemia  · H/o depression/bipolar: on new med: Depakote    Results for orders placed or performed during the hospital encounter of 07/20/22 (from the past 2160 hour(s))   MRI Foot (Forefoot) Left W W/O Contrast    Impression    The distal phalanx of the 2nd toe is absent.  Osteomyelitis is present to the middle phalanx of the 2nd toe and is associated with cellulitis.  No evidence for septic arthritis.    Subchondral collapse is present to the 2nd metatarsal head and is likely degenerative.    Marked atrophy of the intrinsic muscles      Electronically signed by: Mc Schuler  Date:    07/21/2022  Time:    10:09         Lab Results   Component Value Date    HGBA1C 7.3 (H) 07/13/2022     CMP  Sodium Level   Date Value Ref Range Status   07/13/2022  142 136 - 145 mmol/L Final     Potassium Level   Date Value Ref Range Status   07/13/2022 5.1 3.5 - 5.1 mmol/L Final     Carbon Dioxide   Date Value Ref Range Status   07/13/2022 27 22 - 29 mmol/L Final     Blood Urea Nitrogen   Date Value Ref Range Status   07/13/2022 19.1 8.4 - 25.7 mg/dL Final     Creatinine   Date Value Ref Range Status   07/13/2022 1.05 0.73 - 1.18 mg/dL Final     Calcium Level Total   Date Value Ref Range Status   07/13/2022 9.6 8.4 - 10.2 mg/dL Final     Albumin Level   Date Value Ref Range Status   07/13/2022 3.4 (L) 3.5 - 5.0 gm/dL Final     Bilirubin Total   Date Value Ref Range Status   07/13/2022 0.2 <=1.5 mg/dL Final     Alkaline Phosphatase   Date Value Ref Range Status   07/13/2022 69 40 - 150 unit/L Final     Aspartate Aminotransferase   Date Value Ref Range Status   07/13/2022 12 5 - 34 unit/L Final     Alanine Aminotransferase   Date Value Ref Range Status   07/13/2022 14 0 - 55 unit/L Final     Estimated GFR-Non    Date Value Ref Range Status   07/13/2022 >60 mls/min/1.73/m2 Final     Lab Results   Component Value Date    WBC 8.6 07/13/2022    HGB 11.6 (L) 07/13/2022    HCT 37.3 (L) 07/13/2022    MCV 96.9 (H) 07/13/2022     07/13/2022 7/13/22: Sed rate 11/crp 13      Plan:           Plan of Care:    1. Both ulcers debrided  2. both seem smaller in size  3. +cx from 7/7/22: took oral antibiotics: would like to give 1500mg IV dalvance x 1 for suspected osteomyelitis; plus recent MRI of left foot (done by accident, was supposed to be right foot) still indicated osteo of the middle phalanyx  4. Wound Care Orders: dressings of collagen to be applied to the wound and changed 3x/week and as needed. His wife will do the wound care.  5.  Monitor for any signs of infection: watch for increase drainage or pain, fevers, chills, swelling and report this to clinic  6. Nutrition: Must have a high protein diet to support wound  healing; (if renal disease, see  nephrologist for amount allowed):  this should be over 100g protein /day (if no kidney issues); Also rec MVI along with vit C, vit D, zinc and Sidney  7. Diabetes: recent a1c down a point to under 8!  8. Continue offloading with jojo for now;he says he doesn't get up much; rec rolling knee scooter  9. Return to clinic 1 week

## 2022-08-05 NOTE — PATIENT INSTRUCTIONS
Pt seen today by:     Home health and self care DRESSING INSTRUCTIONS:      Wound location: Right Great Toe/Left second toe    Dressings to be changed every other day and as needed if soiled or not intact.  Cleanse wound with wound cleanser or saline  Apply promogran to the wound bed  Cover with exudry and secure with a shaka/cover roll tape  Wear Darco shoe    Return to clinic on Friday, August 12, 2022     Wound may have been debrided in clinic: if so, WHAT YOU NEED TO KNOW:    Debridement is the removal of infected, damaged, or dead tissue so a wound can heal properly. Your wound may need more than one debridement. Debridement can cause bleeding, and a small amount of blood is expected.  AFTER A DEBRIDEMENT:  Keep your wound clean and dry. Do not remove the dressing unless instructed.  Follow the wound care orders provided to you or your home health care provider.  If you have pain, take over the counter pain relievers or pain medication if prescribed.  Elevate the wound and limit excessive activity to prevent bleeding and/or swelling in your wound.  If you see blood coming through the dressing, apply gauze and tape over the dressing and hold firm pressure to the wound with your hand for 5-10 minutes continuously, without peeking, to help the bleeding stop.  Contact [facility] wound care team at [phone] or go to the nearest Emergency department if:  You have a fever greater than 101 taken by mouth.  Your pain gets worse or does not go away, even after taking your regular pain medicine.  Your skin around your wound is red, hot, swollen, or draining pus.  You have bleeding that continues to come through the dressing after holding pressure for 10 minutes   The current daily value (%DV) for protein is 50 grams per day and is meant as a general goal for most people. Further increasing your dietary protein intake is very important for wound healing. Typically one needs over 100g of protein per day to help  with wound healing needs.  If you are a dialysis patient or have problems with your kidneys, talk to your Nephrologist about how much protein you can take in with your condition.  Examples of high protein items that can be added to your diet include: eggs, chicken, red meats, almonds, cottage cheese, Greek yogurt, beans, and peanut butter.  Fortified protein bars, shakes and drinks can add 15-30 additional grams of protein per serving.   Also add:   1 daily general multivitamin   Sidney : 1 packet twice daily   Vitamin C : 500mg twice daily   Zinc 220 mg daily  Vit D : once daily    Call our [facility] wound clinic for questions/concerns a [phone] .

## 2022-08-05 NOTE — PROCEDURES
Debridement    Date/Time: 8/5/2022 10:50 AM  Performed by: Soheila Vidal MD  Authorized by: Soheila Vidal MD     Consent Done?:  Yes (Written)    Preparation: Patient was prepped and draped in usual sterile fashion    Local anesthesia used?: Yes    Local anesthetic:  Topical anesthetic    Wound Details:    Location:  Right foot    Location:  Right 1st Toe    Type of Debridement:  Excisional       Length (cm):  0.3       Area (sq cm):  0.09       Width (cm):  0.3       Percent Debrided (%):  100       Depth (cm):  0.1       Total Area Debrided (sq cm):  0.09    Depth of debridement:  Subcutaneous tissue    Tissue debrided:  Dermis, Epidermis and Subcutaneous    Devitalized tissue debrided:  Biofilm, Slough and Callus    Instruments:  Curette    2nd Wound Details:     Location:  Left foot    Location:  Left 2nd Toe    Location:  Left 2nd Toe    Type of Debridement:  Excisional       Length (cm):  0.3       Area (sq cm):  0.09       Width (cm):  0.3       Percent Debrided (%):  100       Depth (cm):  0.1       Total Area Debrided (sq cm):  0.09    Depth of debridement:  Subcutaneous tissue    Tissue debrided:  Epidermis, Dermis and Subcutaneous    Devitalized tissue debrided:  Biofilm and Slough    Instruments:  Curette    Bleeding:  Minimal  Hemostasis Achieved: Yes    Method Used:  Pressure  Patient tolerance:  Patient tolerated the procedure well with no immediate complications

## 2022-08-08 LAB — POCT GLUCOSE: 388 MG/DL (ref 70–110)

## 2022-08-12 ENCOUNTER — HOSPITAL ENCOUNTER (OUTPATIENT)
Dept: WOUND CARE | Facility: HOSPITAL | Age: 52
Discharge: HOME | End: 2022-08-12
Attending: EMERGENCY MEDICINE
Payer: MEDICARE

## 2022-08-12 VITALS
RESPIRATION RATE: 16 BRPM | HEART RATE: 75 BPM | SYSTOLIC BLOOD PRESSURE: 147 MMHG | TEMPERATURE: 98 F | DIASTOLIC BLOOD PRESSURE: 74 MMHG | WEIGHT: 315 LBS | BODY MASS INDEX: 45.1 KG/M2 | HEIGHT: 70 IN

## 2022-08-12 DIAGNOSIS — L97.521 ULCER OF LEFT SECOND TOE, LIMITED TO BREAKDOWN OF SKIN: ICD-10-CM

## 2022-08-12 DIAGNOSIS — E11.621 TYPE 2 DIABETES MELLITUS WITH FOOT ULCER, WITHOUT LONG-TERM CURRENT USE OF INSULIN: ICD-10-CM

## 2022-08-12 DIAGNOSIS — L97.524 DIABETIC ULCER OF TOE OF LEFT FOOT ASSOCIATED WITH TYPE 2 DIABETES MELLITUS, WITH NECROSIS OF BONE: ICD-10-CM

## 2022-08-12 DIAGNOSIS — M86.179 OTHER ACUTE OSTEOMYELITIS, UNSPECIFIED ANKLE AND FOOT: ICD-10-CM

## 2022-08-12 DIAGNOSIS — L97.519 ULCER OF RIGHT GREAT TOE DUE TO DIABETES MELLITUS: Primary | ICD-10-CM

## 2022-08-12 DIAGNOSIS — E66.01 MORBID OBESITY: ICD-10-CM

## 2022-08-12 DIAGNOSIS — A49.1 GROUP B STREPTOCOCCAL INFECTION: ICD-10-CM

## 2022-08-12 DIAGNOSIS — L97.509 TYPE 2 DIABETES MELLITUS WITH FOOT ULCER, WITHOUT LONG-TERM CURRENT USE OF INSULIN: ICD-10-CM

## 2022-08-12 DIAGNOSIS — A49.01 STAPH AUREUS INFECTION: ICD-10-CM

## 2022-08-12 DIAGNOSIS — E11.621 DIABETIC ULCER OF TOE OF LEFT FOOT ASSOCIATED WITH TYPE 2 DIABETES MELLITUS, WITH NECROSIS OF BONE: ICD-10-CM

## 2022-08-12 DIAGNOSIS — E11.621 ULCER OF RIGHT GREAT TOE DUE TO DIABETES MELLITUS: Primary | ICD-10-CM

## 2022-08-12 DIAGNOSIS — E11.42 DIABETIC POLYNEUROPATHY ASSOCIATED WITH TYPE 2 DIABETES MELLITUS: ICD-10-CM

## 2022-08-12 LAB — POCT GLUCOSE: 181 MG/DL (ref 70–110)

## 2022-08-12 PROCEDURE — 97597 DBRDMT OPN WND 1ST 20 CM/<: CPT

## 2022-08-12 PROCEDURE — 27000999 HC MEDICAL RECORD PHOTO DOCUMENTATION

## 2022-08-12 RX ORDER — ALBUTEROL SULFATE 0.83 MG/ML
SOLUTION RESPIRATORY (INHALATION)
COMMUNITY
Start: 2022-08-05

## 2022-08-12 RX ORDER — AMOXICILLIN AND CLAVULANATE POTASSIUM 875; 125 MG/1; MG/1
1 TABLET, FILM COATED ORAL EVERY 12 HOURS
Qty: 28 TABLET | Refills: 0 | Status: SHIPPED | OUTPATIENT
Start: 2022-08-12 | End: 2022-08-26

## 2022-08-12 RX ORDER — CODEINE PHOSPHATE AND GUAIFENESIN 10; 100 MG/5ML; MG/5ML
SOLUTION ORAL
COMMUNITY
Start: 2022-08-05 | End: 2022-12-05 | Stop reason: ALTCHOICE

## 2022-08-12 RX ORDER — LEVOFLOXACIN 500 MG/1
500 TABLET, FILM COATED ORAL DAILY
COMMUNITY
Start: 2022-08-05 | End: 2022-12-05 | Stop reason: ALTCHOICE

## 2022-08-12 RX ORDER — DOXYCYCLINE HYCLATE 100 MG
100 TABLET ORAL 2 TIMES DAILY WITH MEALS
Qty: 1 TABLET | Refills: 0 | Status: SHIPPED | OUTPATIENT
Start: 2022-08-12 | End: 2022-08-26

## 2022-08-12 NOTE — PROGRESS NOTES
Subjective:       Patient ID: Jong Clayton is a 52 y.o. male.    Chief Complaint: No chief complaint on file.    52 year old morbidly obese WM with diabetes, hypertension, dyslipidemia, bipolar and depression along with prior DFU's with osteomyelitis (left 2nd toe 2019 and again 2020). I did treat him when he had this issue and noted then he was noncompliant with my recommendations. I last saw him for that left 2nd toe on 3/9/20 where he had a bulbous left 2nd hammer toe with +MRI for osteo. He never returned as scheduled but ultimately followed through with my prior recs of distal tip amputation.    He returned to clinic on  5/19/22 complaining of a chronic right plantar great toe ulcer present since Dec 2021 that he tried to self treat. His wife finally got  Him to return to clinic but unfortunately he has remained extremely inconsistent with coming to these wound care appointments and ulcer not getting better.  I told him I was extremely concerned about osteomyelitis on the right great toe despite having a negative x-ray.  I ordered an MRI . He has been given antibiotics for +tissue cx for MSSA and streptococcus on 7/7/22 for which I put him on oral augmentin and doxy while we waited for MRI  In the meantime, he developed another volar toe ulcer on left 2nd toe (toe with prior tip amputation/osteo) in early June 2022.    MRI was done on this left foot instead of the right foot but it still shows osteomyelitis of the remaining middle phalanx.  Pt has failed to come to several scheduled visits here but finally returned on 8/5/22. He has not had MRI of right foot yet. Both ulcers looked much better last week on 8/5/22 but otday he presents with an acute deterioration of right great toe: ulcer is larger and extends to medial almost dorsal side of toe; wife says she wrapped dressings with coban which was first time so maybe too tight?      Review of Systems   Constitutional: Positive for fatigue. Negative for chills  and fever.   HENT: Negative.    Respiratory: Negative.    Cardiovascular: Negative.    Gastrointestinal: Negative.    Musculoskeletal: Negative.    Skin: Wound: see hpi.   Neurological: Positive for numbness (to both feet). Negative for seizures.         Objective:       Vitals:    08/12/22 1151   BP: (!) 147/74   Pulse: 75   Resp: 16   Temp: 98.1 °F (36.7 °C)          Physical Exam  Constitutional:       Appearance: He is obese.   Eyes:      Conjunctiva/sclera: Conjunctivae normal.   Cardiovascular:      Pulses: Normal pulses.           Dorsalis pedis pulses are 2+ on the right side and 2+ on the left side.      Comments: +hair on legs  Musculoskeletal:      Right lower leg: No edema.      Left lower leg: No edema.        Feet:    Neurological:      Mental Status: He is alert.              Wound 05/19/22 1340 Diabetic Ulcer Right medial;plantar Toe, first #1 (Active)   05/19/22 1340    Pre-existing: Yes   Primary Wound Type: Diabetic ulc   Side: Right   Orientation: medial;plantar   Location: Toe, first   Wound Number: #1   Ankle-Brachial Index: Non Compressable 7/14/22   Pulses: palpable/ doopler biphasic   Removal Indication and Assessment:    Wound Outcome:    (Retired) Wound Type:    (Retired) Wound Length (cm):    (Retired) Wound Width (cm):    (Retired) Depth (cm):    Wound Description (Comments):    Removal Indications:    Wound Image   08/12/22 1144   Dressing Appearance Intact 08/12/22 1144   Drainage Amount Moderate 08/12/22 1144   Drainage Characteristics/Odor Serosanguineous;Bleeding controlled 08/12/22 1144   Appearance Eschar 08/12/22 1144   Tissue loss description Full thickness 08/12/22 1144   Black (%), Wound Tissue Color 50 % 08/12/22 1144   Red (%), Wound Tissue Color 50 % 08/12/22 1144   Yellow (%), Wound Tissue Color 0 % 08/12/22 1144   Periwound Area Dry 08/12/22 1144   Wound Edges Defined 08/12/22 1144   Wound Length (cm) 1 cm 08/12/22 1144   Wound Width (cm) 2 cm 08/12/22 1144   Wound Depth  (cm) 0.3 cm 08/12/22 1144   Wound Volume (cm^3) 0.6 cm^3 08/12/22 1144   Wound Surface Area (cm^2) 2 cm^2 08/12/22 1144   Care Cleansed with:;Soap and water 08/12/22 1144   Dressing Applied 08/12/22 1144            Wound 06/09/22 1331 Diabetic Ulcer Left Toe, second #2 (Active)   06/09/22 1331    Pre-existing: Yes   Primary Wound Type: Diabetic ulc   Side: Left   Orientation:    Location: Toe, second   Wound Number: #2   Ankle-Brachial Index: 7/14/22 non compressable   Pulses: Doopler biphasic/ palpable   Removal Indication and Assessment:    Wound Outcome:    (Retired) Wound Type:    (Retired) Wound Length (cm):    (Retired) Wound Width (cm):    (Retired) Depth (cm):    Wound Description (Comments):    Removal Indications:    Wound Image   08/12/22 1146   Dressing Appearance No dressing 08/12/22 1146   Drainage Amount None 08/12/22 1146   Appearance Intact;Maroon 08/12/22 1146   Tissue loss description Not applicable 08/12/22 1146   Black (%), Wound Tissue Color 100 % 08/12/22 1146   Red (%), Wound Tissue Color 0 % 08/12/22 1146   Yellow (%), Wound Tissue Color 0 % 08/12/22 1146   Periwound Area Dry;Intact 08/12/22 1146   Wound Edges Defined 08/12/22 1146   Wound Length (cm) 0.5 cm 08/12/22 1146   Wound Width (cm) 0.5 cm 08/12/22 1146   Wound Depth (cm) 0 cm 08/12/22 1146   Wound Volume (cm^3) 0 cm^3 08/12/22 1146   Wound Surface Area (cm^2) 0.25 cm^2 08/12/22 1146   Care Cleansed with:;Soap and water 08/12/22 1146   Dressing Applied 08/12/22 1146           Assessment:       1. Ulcer of right great toe due to diabetes mellitus    2. Staph aureus infection    3. Group B streptococcal infection    4. Other acute osteomyelitis, unspecified ankle and foot    5. Ulcer of left second toe, limited to breakdown of skin    6. Diabetic polyneuropathy associated with type 2 diabetes mellitus    7. Type 2 diabetes mellitus with foot ulcer, without long-term current use of insulin    8. Morbid obesity    9. Diabetic ulcer of  toe of left foot associated with type 2 diabetes mellitus, with necrosis of bone          ·  Right hallux DFU: first clinic visit 5/19/22, neg xray: recalcitrant  · cx 7/7/22:       0 Result Notes    Aerobic Culture - Tissue Many Streptococcus agalactiae (Group B) Abnormal        Many Methicillin Sensitive Staphylococcus aureus Abnormal             Resulting Agency: [facility]       Susceptibility     Streptococcus agalactiae (Group B) Methicillin Sensitive Staphylococcus aureus     Not Specified Not Specified     Ampicillin <=0.25  Sensitive       Clindamycin >=1  Resistant <=0.25  Sensitive     Erythromycin >=8  Resistant <=0.25  Sensitive     Gentamicin   <=0.5  Sensitive     Moxifloxacin 0.12  Sensitive       Oxacillin   <=0.25  Sensitive     Penicillin <=0.06  Sensitive >=0.5  Resistant     Tetracycline >=16  Resistant <=1  Sensitive     Trimethoprim/Sulfamethoxazole   <=10  Sensitive     Vancomycin 0.5  Sensitive <=0.5  Sensitive                         · Prior left 2nd toe osteomyelitis with partial amputation by Dr CAMERON driscoll 2020, New ulcer left 2nd toe volar side: noted 6/9/22:better  ·  diabetes, hba1c 9.1 April 2022, down to 7.3 July 20222  · Morbid obesity with bmi 54  · Hypertension  ·  Hyperlipidemia  · H/o depression/bipolar: on new med: Depakote    Results for orders placed or performed during the hospital encounter of 07/20/22 (from the past 2160 hour(s))   MRI Foot (Forefoot) Left W W/O Contrast    Impression    The distal phalanx of the 2nd toe is absent.  Osteomyelitis is present to the middle phalanx of the 2nd toe and is associated with cellulitis.  No evidence for septic arthritis.    Subchondral collapse is present to the 2nd metatarsal head and is likely degenerative.    Marked atrophy of the intrinsic muscles      Electronically signed by: Mc Schuler  Date:    07/21/2022  Time:    10:09         Lab Results   Component Value Date    HGBA1C 7.3 (H) 07/13/2022     CMP  Sodium Level   Date  Value Ref Range Status   07/13/2022 142 136 - 145 mmol/L Final     Potassium Level   Date Value Ref Range Status   07/13/2022 5.1 3.5 - 5.1 mmol/L Final     Carbon Dioxide   Date Value Ref Range Status   07/13/2022 27 22 - 29 mmol/L Final     Blood Urea Nitrogen   Date Value Ref Range Status   07/13/2022 19.1 8.4 - 25.7 mg/dL Final     Creatinine   Date Value Ref Range Status   07/13/2022 1.05 0.73 - 1.18 mg/dL Final     Calcium Level Total   Date Value Ref Range Status   07/13/2022 9.6 8.4 - 10.2 mg/dL Final     Albumin Level   Date Value Ref Range Status   07/13/2022 3.4 (L) 3.5 - 5.0 gm/dL Final     Bilirubin Total   Date Value Ref Range Status   07/13/2022 0.2 <=1.5 mg/dL Final     Alkaline Phosphatase   Date Value Ref Range Status   07/13/2022 69 40 - 150 unit/L Final     Aspartate Aminotransferase   Date Value Ref Range Status   07/13/2022 12 5 - 34 unit/L Final     Alanine Aminotransferase   Date Value Ref Range Status   07/13/2022 14 0 - 55 unit/L Final     Estimated GFR-Non    Date Value Ref Range Status   07/13/2022 >60 mls/min/1.73/m2 Final     Lab Results   Component Value Date    WBC 8.6 07/13/2022    HGB 11.6 (L) 07/13/2022    HCT 37.3 (L) 07/13/2022    MCV 96.9 (H) 07/13/2022     07/13/2022 7/13/22: Sed rate 11/crp 13      Plan:           Plan of Care:    1. Acute deterioration of right hallux ulcer noted today on 8/12/22 when last week it looked like it was healing. Pt nor wife know what happened ; she wrapped it with coban this past week so maybe too tight??  2. I will rx augmentin and doxy based on prior culture: we will def need that MRI of right foot . I had ordered dalvance too but [employer] has to approve  3. Prognosis guarded  4.  Monitor for any signs of infection: watch for increase drainage or pain, fevers, chills, swelling and report this to clinic immediately (the toe ulcer changed a few days ago but they did not call us)  5. Nutrition: Must have a high protein  diet to support wound  healing; (if renal disease, see nephrologist for amount allowed):  this should be over 100g protein /day (if no kidney issues); Also rec MVI along with vit C, vit D, zinc and Sidney  6. Diabetes: recent a1c down to under 8  7. Continue offloading with jojo for now;he says he doesn't get up much; rec rolling knee scooter  8. Return to clinic 1 week         The time spent including preparing to see the patient, obtaining patient history and assessment, evaluation of the plan of care, patient/caregiver counseling and education, orders, documentation, coordination of care, and other professional medical management activities for today's encounter was 30 minutes.  Time spent performing procedures during today's encounter was 10 minutes.

## 2022-08-12 NOTE — PATIENT INSTRUCTIONS
Pt seen today by:     Home health and self care DRESSING INSTRUCTIONS:      Wound location: Right Great Toe/Left second toe    Dressings to be changed every other day and as needed if soiled or not intact.  Cleanse wound with wound cleanser or saline  Apply silver alginate to the wound bed  Cover with exudry and secure with a shaka/cover roll tape  Wear Darco shoe    Return to clinic on Friday, August 19, 2022 at 11:00 am    Wound may have been debrided in clinic: if so, WHAT YOU NEED TO KNOW:    Debridement is the removal of infected, damaged, or dead tissue so a wound can heal properly. Your wound may need more than one debridement. Debridement can cause bleeding, and a small amount of blood is expected.  AFTER A DEBRIDEMENT:  Keep your wound clean and dry. Do not remove the dressing unless instructed.  Follow the wound care orders provided to you or your home health care provider.  If you have pain, take over the counter pain relievers or pain medication if prescribed.  Elevate the wound and limit excessive activity to prevent bleeding and/or swelling in your wound.  If you see blood coming through the dressing, apply gauze and tape over the dressing and hold firm pressure to the wound with your hand for 5-10 minutes continuously, without peeking, to help the bleeding stop.  Contact [facility] wound care team at [phone] or go to the nearest Emergency department if:  You have a fever greater than 101 taken by mouth.  Your pain gets worse or does not go away, even after taking your regular pain medicine.  Your skin around your wound is red, hot, swollen, or draining pus.  You have bleeding that continues to come through the dressing after holding pressure for 10 minutes   The current daily value (%DV) for protein is 50 grams per day and is meant as a general goal for most people. Further increasing your dietary protein intake is very important for wound healing. Typically one needs over 100g of protein  per day to help with wound healing needs.  If you are a dialysis patient or have problems with your kidneys, talk to your Nephrologist about how much protein you can take in with your condition.  Examples of high protein items that can be added to your diet include: eggs, chicken, red meats, almonds, cottage cheese, Greek yogurt, beans, and peanut butter.  Fortified protein bars, shakes and drinks can add 15-30 additional grams of protein per serving.   Also add:   1 daily general multivitamin   Sidney : 1 packet twice daily   Vitamin C : 500mg twice daily   Zinc 220 mg daily  Vit D : once daily    Call our [facility] wound clinic for questions/concerns a [phone] .

## 2022-08-12 NOTE — PROCEDURES
"Debridement    Date/Time: 8/12/2022 10:53 AM  Performed by: Soheila Vidal MD  Authorized by: Soheila Vidal MD   Associated wounds:        Wound 05/19/22 1340 Diabetic Ulcer Right medial;plantar Toe, first #1  Time out: Immediately prior to procedure a "time out" was called to verify the correct patient, procedure, equipment, support staff and site/side marked as required.    Consent Done?:  Yes (Written)    Preparation: Patient was prepped and draped in usual sterile fashion    Local anesthesia used?: Yes    Local anesthetic:  Topical anesthetic    Wound Details:    Location:  Right foot    Location:  Right 1st Toe    Type of Debridement:  Non-excisional       Length (cm):  1       Area (sq cm):  2       Width (cm):  2       Percent Debrided (%):  100       Depth (cm):  0.3       Total Area Debrided (sq cm):  2    Depth of debridement:  Epidermis/Dermis    Tissue debrided:  Dermis and Epidermis    Devitalized tissue debrided:  Other    Instruments:  Forceps and Scissors    Bleeding:  None  Patient tolerance:  Patient tolerated the procedure well with no immediate complications      "

## 2022-08-26 ENCOUNTER — HOSPITAL ENCOUNTER (OUTPATIENT)
Dept: WOUND CARE | Facility: HOSPITAL | Age: 52
Discharge: HOME | End: 2022-08-26
Attending: EMERGENCY MEDICINE
Payer: MEDICARE

## 2022-08-26 VITALS
BODY MASS INDEX: 45.1 KG/M2 | RESPIRATION RATE: 18 BRPM | TEMPERATURE: 98 F | DIASTOLIC BLOOD PRESSURE: 71 MMHG | HEART RATE: 90 BPM | WEIGHT: 315 LBS | SYSTOLIC BLOOD PRESSURE: 145 MMHG | HEIGHT: 70 IN

## 2022-08-26 DIAGNOSIS — E11.621 ULCER OF RIGHT GREAT TOE DUE TO DIABETES MELLITUS: Primary | ICD-10-CM

## 2022-08-26 DIAGNOSIS — E66.01 MORBID OBESITY: ICD-10-CM

## 2022-08-26 DIAGNOSIS — L97.519 ULCER OF RIGHT GREAT TOE DUE TO DIABETES MELLITUS: Primary | ICD-10-CM

## 2022-08-26 DIAGNOSIS — E11.621 TYPE 2 DIABETES MELLITUS WITH FOOT ULCER, WITHOUT LONG-TERM CURRENT USE OF INSULIN: ICD-10-CM

## 2022-08-26 DIAGNOSIS — I10 ESSENTIAL HYPERTENSION, BENIGN: ICD-10-CM

## 2022-08-26 DIAGNOSIS — L97.521 ULCER OF LEFT SECOND TOE, LIMITED TO BREAKDOWN OF SKIN: ICD-10-CM

## 2022-08-26 DIAGNOSIS — F31.81 BIPOLAR II DISORDER: ICD-10-CM

## 2022-08-26 DIAGNOSIS — E11.42 DIABETIC POLYNEUROPATHY ASSOCIATED WITH TYPE 2 DIABETES MELLITUS: ICD-10-CM

## 2022-08-26 DIAGNOSIS — M86.179 OTHER ACUTE OSTEOMYELITIS, UNSPECIFIED ANKLE AND FOOT: ICD-10-CM

## 2022-08-26 DIAGNOSIS — Z91.199 MEDICALLY NONCOMPLIANT: ICD-10-CM

## 2022-08-26 DIAGNOSIS — L97.509 TYPE 2 DIABETES MELLITUS WITH FOOT ULCER, WITHOUT LONG-TERM CURRENT USE OF INSULIN: ICD-10-CM

## 2022-08-26 DIAGNOSIS — A49.1 GROUP B STREPTOCOCCAL INFECTION: ICD-10-CM

## 2022-08-26 DIAGNOSIS — A49.01 STAPH AUREUS INFECTION: ICD-10-CM

## 2022-08-26 LAB — POCT GLUCOSE: 187 MG/DL (ref 70–110)

## 2022-08-26 PROCEDURE — 11042 DBRDMT SUBQ TIS 1ST 20SQCM/<: CPT

## 2022-08-26 PROCEDURE — 27000999 HC MEDICAL RECORD PHOTO DOCUMENTATION

## 2022-08-26 NOTE — PROGRESS NOTES
Subjective:       Patient ID: Jong Clayton is a 52 y.o. male.    Chief Complaint: No chief complaint on file.    52 year old morbidly obese WM with diabetes, hypertension, dyslipidemia, bipolar and depression along with prior DFU's with osteomyelitis (left 2nd toe 2019 and again 2020). I did treat him when he had this issue and noted then he was noncompliant with my recommendations. I last saw him for that left 2nd toe on 3/9/20 where he had a bulbous left 2nd hammer toe with +MRI for osteo. He never returned as scheduled but ultimately followed through with my prior recs of distal tip amputation.    He returned to clinic in mid May 2022 complaining of a chronic right plantar great toe ulcer present since Dec 2021 that he tried to self treat. His wife finally got him to agree to come to clinic but he remains very inconsistent with his regularly scheduled visits here. The right hallux ulcer was stagnant and  I told him I was extremely concerned about osteomyelitis on the right great toe despite having a negative x-ray.  I ordered an MRI . He has been given antibiotics for +tissue cx for MSSA and streptococcus on 7/7/22 for which I put him on oral augmentin and doxy while we waited for MRI  In the meantime, he developed another volar toe ulcer on left 2nd toe (toe with prior tip amputation/osteo) in early June 2022.    MRI was done on this left foot instead of the right foot but it still shows osteomyelitis of the remaining middle phalanx.  The right hallux ulcer looked better on 8/5/22 but returned on 8/12/22 with a significant deterioration of this toe. I felt we needed to proceed with MRI and I ordered a dose of IV Dalvance. I also gave rx of augmentin and doxycycline.   He was to return the following week but did not.  Comes in today on 8/26/22: toe overall a bit better; almost done with the antibiotics; still no dalvance or mri      Review of Systems   Constitutional: Positive for fatigue. Negative for chills  and fever.   HENT: Negative.    Respiratory: Negative.    Cardiovascular: Negative.    Gastrointestinal: Negative.    Musculoskeletal: Negative.    Skin: Positive for wound (see hpi).   Neurological: Positive for numbness (to both feet). Negative for seizures.         Objective:       Vitals:    08/26/22 0912   BP: (!) 145/71   Pulse: 90   Resp: 18   Temp: 98.2 °F (36.8 °C)     Recent Labs   Lab 08/26/22 0915   POCTGLUCOSE 187*          Physical Exam  Constitutional:       Appearance: He is morbidly obese.   Eyes:      Conjunctiva/sclera: Conjunctivae normal.   Cardiovascular:      Pulses: Normal pulses.           Dorsalis pedis pulses are 2+ on the right side and 2+ on the left side.      Comments: +hair on legs  Musculoskeletal:      Right lower leg: No edema.      Left lower leg: No edema.        Feet:    Feet:      Comments: Hallux valgus deformity noted  Neurological:      Mental Status: He is alert.              Wound 05/19/22 1340 Diabetic Ulcer Right medial;plantar Toe, first #1 (Active)   05/19/22 1340    Pre-existing: Yes   Primary Wound Type: Diabetic ulc   Side: Right   Orientation: medial;plantar   Location: Toe, first   Wound Number: #1   Ankle-Brachial Index: Non Compressable 7/14/22   Pulses: palpable/ doopler biphasic   Removal Indication and Assessment:    Wound Outcome:    (Retired) Wound Type:    (Retired) Wound Length (cm):    (Retired) Wound Width (cm):    (Retired) Depth (cm):    Wound Description (Comments):    Removal Indications:    Wound Image   08/26/22 0924   Dressing Appearance Intact;Moist drainage 08/26/22 0924   Drainage Amount Moderate 08/26/22 0924   Drainage Characteristics/Odor Yellow;Serosanguineous 08/26/22 0924   Appearance Pink;Yellow 08/26/22 0924   Tissue loss description Full thickness 08/26/22 0924   Black (%), Wound Tissue Color 0 % 08/26/22 0924   Red (%), Wound Tissue Color 80 % 08/26/22 0924   Yellow (%), Wound Tissue Color 20 % 08/26/22 0924   Periwound Area Dry  08/26/22 0924   Wound Edges Callused 08/26/22 0924   Wound Length (cm) 1 cm 08/26/22 0924   Wound Width (cm) 1.7 cm 08/26/22 0924   Wound Depth (cm) 0.2 cm 08/26/22 0924   Wound Volume (cm^3) 0.34 cm^3 08/26/22 0924   Wound Surface Area (cm^2) 1.7 cm^2 08/26/22 0924   Care Cleansed with:;Wound cleanser 08/26/22 0924            Wound 06/09/22 1331 Diabetic Ulcer Left Toe, second #2 (Active)   06/09/22 1331    Pre-existing: Yes   Primary Wound Type: Diabetic ulc   Side: Left   Orientation:    Location: Toe, second   Wound Number: #2   Ankle-Brachial Index: 7/14/22 non compressable   Pulses: Doopler biphasic/ palpable   Removal Indication and Assessment:    Wound Outcome:    (Retired) Wound Type:    (Retired) Wound Length (cm):    (Retired) Wound Width (cm):    (Retired) Depth (cm):    Wound Description (Comments):    Removal Indications:    Wound Image   08/26/22 0925   Dressing Appearance Open to air 08/26/22 0925   Drainage Amount None 08/26/22 0925   Appearance Eschar 08/26/22 0925   Tissue loss description Partial thickness 08/26/22 0925   Black (%), Wound Tissue Color 0 % 08/26/22 0925   Red (%), Wound Tissue Color 0 % 08/26/22 0925   Yellow (%), Wound Tissue Color 0 % 08/26/22 0925   Periwound Area Intact 08/26/22 0925   Wound Edges Undefined 08/26/22 0925   Wound Length (cm) 0.5 cm 08/26/22 0925   Wound Width (cm) 0.3 cm 08/26/22 0925   Wound Depth (cm) 0 cm 08/26/22 0925   Wound Volume (cm^3) 0 cm^3 08/26/22 0925   Wound Surface Area (cm^2) 0.15 cm^2 08/26/22 0925   Care Cleansed with:;Wound cleanser 08/26/22 0925   Dressing Removed;Changed;Calcium alginate;Silver;Absorptive Pad;Rolled gauze 08/26/22 0925            Wound 08/26/22 Diabetic Ulcer Right medial Toe, first #3 (Active)   08/26/22     Pre-existing:    Primary Wound Type: Diabetic ulc   Side: Right   Orientation: medial   Location: Toe, first   Wound Number: #3   Ankle-Brachial Index:    Pulses:    Removal Indication and Assessment:    Wound Outcome:     (Retired) Wound Type:    (Retired) Wound Length (cm):    (Retired) Wound Width (cm):    (Retired) Depth (cm):    Wound Description (Comments):    Removal Indications:    Wound Image   08/26/22 0926   Dressing Appearance Intact;Moist drainage 08/26/22 0926   Drainage Amount Moderate 08/26/22 0926   Drainage Characteristics/Odor Yellow 08/26/22 0926   Appearance Pink 08/26/22 0926   Tissue loss description Full thickness 08/26/22 0926   Red (%), Wound Tissue Color 100 % 08/26/22 0926   Yellow (%), Wound Tissue Color 0 % 08/26/22 0926   Periwound Area Intact 08/26/22 0926   Wound Edges Defined 08/26/22 0926   Wound Length (cm) 2.5 cm 08/26/22 0926   Wound Width (cm) 1.7 cm 08/26/22 0926   Wound Depth (cm) 0.1 cm 08/26/22 0926   Wound Volume (cm^3) 0.425 cm^3 08/26/22 0926   Wound Surface Area (cm^2) 4.25 cm^2 08/26/22 0926   Care Cleansed with:;Wound cleanser 08/26/22 0926   Dressing Removed;Changed;Calcium alginate;Silver;Absorptive Pad;Rolled gauze 08/26/22 0926           Assessment:       1. Ulcer of right great toe due to diabetes mellitus    2. Staph aureus infection    3. Group B streptococcal infection    4. Other acute osteomyelitis, unspecified ankle and foot    5. Ulcer of left second toe, limited to breakdown of skin    6. Diabetic polyneuropathy associated with type 2 diabetes mellitus    7. Type 2 diabetes mellitus with foot ulcer, without long-term current use of insulin    8. Morbid obesity    9. Essential hypertension, benign    10. Bipolar II disorder    11. Medically noncompliant          ·  Right hallux DFU: first clinic visit 5/19/22, neg xray: recalcitrant, deterioration noted 8/12/22 :rx augmentin and doxycycline  · cx 7/7/22:       0 Result Notes    Aerobic Culture - Tissue Many Streptococcus agalactiae (Group B) Abnormal        Many Methicillin Sensitive Staphylococcus aureus Abnormal             Resulting Agency: [facility]       Susceptibility     Streptococcus agalactiae (Group B)  Methicillin Sensitive Staphylococcus aureus     Not Specified Not Specified     Ampicillin <=0.25  Sensitive       Clindamycin >=1  Resistant <=0.25  Sensitive     Erythromycin >=8  Resistant <=0.25  Sensitive     Gentamicin   <=0.5  Sensitive     Moxifloxacin 0.12  Sensitive       Oxacillin   <=0.25  Sensitive     Penicillin <=0.06  Sensitive >=0.5  Resistant     Tetracycline >=16  Resistant <=1  Sensitive     Trimethoprim/Sulfamethoxazole   <=10  Sensitive     Vancomycin 0.5  Sensitive <=0.5  Sensitive                         · Prior left 2nd toe osteomyelitis with partial amputation by Dr CAMERON driscoll 2020, New ulcer left 2nd toe volar side: noted 6/9/22:better  ·  diabetes, hba1c 9.1 April 2022, down to 7.3 July 20222  · Morbid obesity with bmi 54  · Hypertension  ·  Hyperlipidemia  · H/o depression/bipolar    Results for orders placed or performed during the hospital encounter of 07/20/22 (from the past 2160 hour(s))   MRI Foot (Forefoot) Left W W/O Contrast    Impression    The distal phalanx of the 2nd toe is absent.  Osteomyelitis is present to the middle phalanx of the 2nd toe and is associated with cellulitis.  No evidence for septic arthritis.    Subchondral collapse is present to the 2nd metatarsal head and is likely degenerative.    Marked atrophy of the intrinsic muscles      Electronically signed by: Mc Schuler  Date:    07/21/2022  Time:    10:09         Lab Results   Component Value Date    HGBA1C 7.3 (H) 07/13/2022     CMP  Sodium Level   Date Value Ref Range Status   07/13/2022 142 136 - 145 mmol/L Final     Potassium Level   Date Value Ref Range Status   07/13/2022 5.1 3.5 - 5.1 mmol/L Final     Carbon Dioxide   Date Value Ref Range Status   07/13/2022 27 22 - 29 mmol/L Final     Blood Urea Nitrogen   Date Value Ref Range Status   07/13/2022 19.1 8.4 - 25.7 mg/dL Final     Creatinine   Date Value Ref Range Status   07/13/2022 1.05 0.73 - 1.18 mg/dL Final     Calcium Level Total   Date Value Ref  Range Status   07/13/2022 9.6 8.4 - 10.2 mg/dL Final     Albumin Level   Date Value Ref Range Status   07/13/2022 3.4 (L) 3.5 - 5.0 gm/dL Final     Bilirubin Total   Date Value Ref Range Status   07/13/2022 0.2 <=1.5 mg/dL Final     Alkaline Phosphatase   Date Value Ref Range Status   07/13/2022 69 40 - 150 unit/L Final     Aspartate Aminotransferase   Date Value Ref Range Status   07/13/2022 12 5 - 34 unit/L Final     Alanine Aminotransferase   Date Value Ref Range Status   07/13/2022 14 0 - 55 unit/L Final     Estimated GFR-Non    Date Value Ref Range Status   07/13/2022 >60 mls/min/1.73/m2 Final     Lab Results   Component Value Date    WBC 8.6 07/13/2022    HGB 11.6 (L) 07/13/2022    HCT 37.3 (L) 07/13/2022    MCV 96.9 (H) 07/13/2022     07/13/2022 7/13/22: Sed rate 11/crp 13      Plan:           Plan of Care:    1. His right great toe had a  deterioration of the ulcer on 8/12/22 : better; I had  rx augmentin and doxy based on prior culture: We are stiil needing that MRI (was to be today but pt rescheduled to next week) and I still need the IV dalvance: pt can't afford home or private company copay. I have been waiting for him to get it in a hospital based setting but staff can't tell me what the hold  Up is...insurance I believe but very frustrating. I don't think he would be able to afford 6 weeks of IV antibiotics either. Trying to tdo the best we can for him but many obstacles. I will call in another 2 weeks of augmentin and doxy  2. Prognosis guarded  3.  Monitor for any signs of infection: watch for increase drainage or pain, fevers, chills, swelling and report this to clinic immediately (the toe ulcer changed a few days ago but they did not call us)  4. Nutrition: Must have a high protein diet to support wound  healing; (if renal disease, see nephrologist for amount allowed):  this should be over 100g protein /day (if no kidney issues); Also rec MVI along with vit C, vit D, zinc  and Sidney  5. Diabetes: recent a1c down to under 8  6. Continue offloading with jojo for now;he says he doesn't get up much; rec rolling knee scooter  7. Return to clinic 1 week

## 2022-08-26 NOTE — PATIENT INSTRUCTIONS
Pt seen today by:     Home health and self care DRESSING INSTRUCTIONS:      Wound location: Right Great Toe/Left second toe    Dressings to be changed every other day and as needed if soiled or not intact.  Cleanse wound with wound cleanser or saline  Apply silver alginate to the wound bed  Cover with exudry and secure with a shaka/cover roll tape  Wear Darco shoe    Return to clinic on Friday, Sept 2nd, 2022 at 11:00 am    Wound may have been debrided in clinic: if so, WHAT YOU NEED TO KNOW:    Debridement is the removal of infected, damaged, or dead tissue so a wound can heal properly. Your wound may need more than one debridement. Debridement can cause bleeding, and a small amount of blood is expected.  AFTER A DEBRIDEMENT:  Keep your wound clean and dry. Do not remove the dressing unless instructed.  Follow the wound care orders provided to you or your home health care provider.  If you have pain, take over the counter pain relievers or pain medication if prescribed.  Elevate the wound and limit excessive activity to prevent bleeding and/or swelling in your wound.  If you see blood coming through the dressing, apply gauze and tape over the dressing and hold firm pressure to the wound with your hand for 5-10 minutes continuously, without peeking, to help the bleeding stop.  Contact [facility] wound care team at [phone] or go to the nearest Emergency department if:  You have a fever greater than 101 taken by mouth.  Your pain gets worse or does not go away, even after taking your regular pain medicine.  Your skin around your wound is red, hot, swollen, or draining pus.  You have bleeding that continues to come through the dressing after holding pressure for 10 minutes   The current daily value (%DV) for protein is 50 grams per day and is meant as a general goal for most people. Further increasing your dietary protein intake is very important for wound healing. Typically one needs over 100g of protein  per day to help with wound healing needs.  If you are a dialysis patient or have problems with your kidneys, talk to your Nephrologist about how much protein you can take in with your condition.  Examples of high protein items that can be added to your diet include: eggs, chicken, red meats, almonds, cottage cheese, Greek yogurt, beans, and peanut butter.  Fortified protein bars, shakes and drinks can add 15-30 additional grams of protein per serving.   Also add:   1 daily general multivitamin   Sidney : 1 packet twice daily   Vitamin C : 500mg twice daily   Zinc 220 mg daily  Vit D : once daily    Call our [facility] wound clinic for questions/concerns a [phone] .

## 2022-08-26 NOTE — PROCEDURES
"Debridement    Date/Time: 8/26/2022 8:00 AM  Performed by: Soheila Vidal MD  Authorized by: Soheila Vidal MD   Associated wounds:        Wound 05/19/22 1340 Diabetic Ulcer Right medial;plantar Toe, first #1  Time out: Immediately prior to procedure a "time out" was called to verify the correct patient, procedure, equipment, support staff and site/side marked as required.    Consent Done?:  Yes (Written)    Preparation: Patient was prepped and draped in usual sterile fashion    Local anesthesia used?: Yes    Local anesthetic:  Topical anesthetic    Wound Details:    Location:  Right foot    Location:  Right 1st Toe    Type of Debridement:  Excisional       Length (cm):  1       Area (sq cm):  1.7       Width (cm):  1.7       Percent Debrided (%):  100       Depth (cm):  0.2       Total Area Debrided (sq cm):  1.7    Depth of debridement:  Subcutaneous tissue    Tissue debrided:  Dermis, Epidermis and Subcutaneous    Devitalized tissue debrided:  Biofilm and Slough    Instruments:  Curette    Bleeding:  Minimal  Hemostasis Achieved: Yes    Method Used:  Pressure  Patient tolerance:  Patient tolerated the procedure well with no immediate complications      "

## 2022-09-09 ENCOUNTER — HOSPITAL ENCOUNTER (OUTPATIENT)
Dept: WOUND CARE | Facility: HOSPITAL | Age: 52
Discharge: HOME | End: 2022-09-09
Attending: EMERGENCY MEDICINE
Payer: MEDICARE

## 2022-09-09 VITALS
WEIGHT: 315 LBS | SYSTOLIC BLOOD PRESSURE: 165 MMHG | HEIGHT: 70 IN | BODY MASS INDEX: 45.1 KG/M2 | HEART RATE: 80 BPM | TEMPERATURE: 98 F | RESPIRATION RATE: 18 BRPM | DIASTOLIC BLOOD PRESSURE: 88 MMHG

## 2022-09-09 DIAGNOSIS — E11.42 DIABETIC POLYNEUROPATHY ASSOCIATED WITH TYPE 2 DIABETES MELLITUS: ICD-10-CM

## 2022-09-09 DIAGNOSIS — L97.521 ULCER OF LEFT SECOND TOE, LIMITED TO BREAKDOWN OF SKIN: ICD-10-CM

## 2022-09-09 DIAGNOSIS — F31.81 BIPOLAR II DISORDER: ICD-10-CM

## 2022-09-09 DIAGNOSIS — A49.01 STAPH AUREUS INFECTION: ICD-10-CM

## 2022-09-09 DIAGNOSIS — E66.01 MORBID OBESITY: ICD-10-CM

## 2022-09-09 DIAGNOSIS — E11.621 ULCER OF RIGHT GREAT TOE DUE TO DIABETES MELLITUS: Primary | ICD-10-CM

## 2022-09-09 DIAGNOSIS — Z91.199 MEDICALLY NONCOMPLIANT: ICD-10-CM

## 2022-09-09 DIAGNOSIS — M86.179 OTHER ACUTE OSTEOMYELITIS, UNSPECIFIED ANKLE AND FOOT: ICD-10-CM

## 2022-09-09 DIAGNOSIS — L97.509 TYPE 2 DIABETES MELLITUS WITH FOOT ULCER, WITHOUT LONG-TERM CURRENT USE OF INSULIN: ICD-10-CM

## 2022-09-09 DIAGNOSIS — A49.1 GROUP B STREPTOCOCCAL INFECTION: ICD-10-CM

## 2022-09-09 DIAGNOSIS — L97.519 ULCER OF RIGHT GREAT TOE DUE TO DIABETES MELLITUS: Primary | ICD-10-CM

## 2022-09-09 DIAGNOSIS — E11.621 TYPE 2 DIABETES MELLITUS WITH FOOT ULCER, WITHOUT LONG-TERM CURRENT USE OF INSULIN: ICD-10-CM

## 2022-09-09 DIAGNOSIS — I10 ESSENTIAL HYPERTENSION, BENIGN: ICD-10-CM

## 2022-09-09 LAB — POCT GLUCOSE: 461 MG/DL (ref 70–110)

## 2022-09-09 PROCEDURE — 11042 DBRDMT SUBQ TIS 1ST 20SQCM/<: CPT

## 2022-09-09 PROCEDURE — 27000999 HC MEDICAL RECORD PHOTO DOCUMENTATION

## 2022-09-09 RX ORDER — COVID-19 ANTIGEN TEST
KIT MISCELLANEOUS
COMMUNITY
Start: 2022-09-07

## 2022-09-09 NOTE — PROGRESS NOTES
Subjective:       Patient ID: Jong Clayton is a 52 y.o. male.    Chief Complaint: No chief complaint on file.    51 y/o morbidly obese WM with diabetes, hypertension, dyslipidemia, bipolar and depression along with prior DFU's with osteomyelitis (left 2nd toe 2019 and again 2020). I did treat him when he had this issue and noted then he was noncompliant with my recommendations. I last saw him for that left 2nd toe on 3/9/20 where he had a bulbous left 2nd hammer toe with +MRI for osteo. He never returned as scheduled but ultimately followed through with my prior recs of distal tip amputation.    He returned to clinic in mid May 2022 complaining of a chronic right plantar great toe ulcer present since Dec 2021 that he tried to self treat. His wife finally got him to agree to come to clinic but he remains very inconsistent with his regularly scheduled visits here. The right hallux ulcer was stagnant and  I told him I was extremely concerned about osteomyelitis on the right great toe despite having a negative x-ray.  I ordered an MRI . He has been given antibiotics for +tissue cx for MSSA and streptococcus on 7/7/22 for which I put him on oral augmentin and doxy while we waited for MRI  In the meantime, he developed another volar toe ulcer on left 2nd toe (toe with prior tip amputation/osteo) in early June 2022.    MRI was done on this left foot instead of the right foot but it still shows osteomyelitis of the remaining middle phalanx.  The right hallux ulcer looked better on 8/5/22 but returned on 8/12/22 with a significant deterioration of this toe. I felt we needed to proceed with MRI and I ordered a dose of IV Dalvance. I also gave rx of augmentin and doxycycline.   He was to return the following week but did not.  He came in on 8/26/22: told me he cancelled MRI but was going to reschedule it; the toe overall a bit better; he was almost done with the oral antibiotics ; still not getting approval for IV  Lan  He missed yet another appointment; comes in today on 9/9/22, ulcer is better actually; rescheduled his MRI for next week      Review of Systems   Constitutional:  Positive for fatigue. Negative for chills and fever.   HENT: Negative.     Respiratory: Negative.     Cardiovascular: Negative.    Gastrointestinal: Negative.    Musculoskeletal: Negative.    Skin:  Positive for wound (see hpi).   Neurological:  Positive for numbness (to both feet). Negative for seizures.       Objective:       Vitals:    09/09/22 1222   BP: (!) 165/88   Pulse: 80   Resp: 18   Temp: 98.4 °F (36.9 °C)       No results for input(s): POCTGLUCOSE in the last 24 hours.         Physical Exam  Constitutional:       Appearance: He is morbidly obese.   Eyes:      Conjunctiva/sclera: Conjunctivae normal.   Cardiovascular:      Pulses: Normal pulses.           Dorsalis pedis pulses are 2+ on the right side and 2+ on the left side.      Comments: +hair on legs  Musculoskeletal:      Right lower leg: No edema.      Left lower leg: No edema.        Feet:    Feet:      Comments: Hallux valgus deformity noted  Neurological:      Mental Status: He is alert.            Wound 05/19/22 1340 Diabetic Ulcer Right medial;plantar Toe, first #1 (Active)   05/19/22 1340    Pre-existing: Yes   Primary Wound Type: Diabetic ulc   Side: Right   Orientation: medial;plantar   Location: Toe, first   Wound Number: #1   Ankle-Brachial Index: Non Compressable 7/14/22   Pulses: palpable/ doopler biphasic   Removal Indication and Assessment:    Wound Outcome:    (Retired) Wound Type:    (Retired) Wound Length (cm):    (Retired) Wound Width (cm):    (Retired) Depth (cm):    Wound Description (Comments):    Removal Indications:    Wound Image   09/09/22 1235   Dressing Appearance Moist drainage 09/09/22 1235   Drainage Amount Small 09/09/22 1235   Drainage Characteristics/Odor Serosanguineous 09/09/22 1235   Appearance Pink 09/09/22 1235   Tissue loss description Full  thickness 09/09/22 1235   Black (%), Wound Tissue Color 0 % 09/09/22 1235   Red (%), Wound Tissue Color 90 % 09/09/22 1235   Yellow (%), Wound Tissue Color 10 % 09/09/22 1235   Periwound Area Intact 09/09/22 1235   Wound Edges Defined 09/09/22 1235   Wound Length (cm) 1 cm 09/09/22 1235   Wound Width (cm) 1.5 cm 09/09/22 1235   Wound Depth (cm) 0.1 cm 09/09/22 1235   Wound Volume (cm^3) 0.15 cm^3 09/09/22 1235   Wound Surface Area (cm^2) 1.5 cm^2 09/09/22 1235   Care Cleansed with:;Wound cleanser 09/09/22 1235   Dressing Applied;Honey;Absorptive Pad;Other (comment) 09/09/22 1235            Wound 08/26/22 Diabetic Ulcer Right medial Toe, first #3 (Active)   08/26/22     Pre-existing:    Primary Wound Type: Diabetic ulc   Side: Right   Orientation: medial   Location: Toe, first   Wound Number: #3   Ankle-Brachial Index:    Pulses:    Removal Indication and Assessment:    Wound Outcome:    (Retired) Wound Type:    (Retired) Wound Length (cm):    (Retired) Wound Width (cm):    (Retired) Depth (cm):    Wound Description (Comments):    Removal Indications:    Wound Image   09/09/22 1237   Dressing Appearance Moist drainage 09/09/22 1237   Drainage Amount Small 09/09/22 1237   Drainage Characteristics/Odor Serosanguineous 09/09/22 1237   Appearance Pink;Yellow 09/09/22 1237   Tissue loss description Full thickness 09/09/22 1237   Black (%), Wound Tissue Color 0 % 09/09/22 1237   Red (%), Wound Tissue Color 956 % 09/09/22 1237   Yellow (%), Wound Tissue Color 5 % 09/09/22 1237   Periwound Area Intact 09/09/22 1237   Wound Edges Defined 09/09/22 1237   Wound Length (cm) 0.5 cm 09/09/22 1237   Wound Width (cm) 0.5 cm 09/09/22 1237   Wound Depth (cm) 0.1 cm 09/09/22 1237   Wound Volume (cm^3) 0.025 cm^3 09/09/22 1237   Wound Surface Area (cm^2) 0.25 cm^2 09/09/22 1237   Care Cleansed with:;Wound cleanser 09/09/22 1237   Dressing Applied;Honey;Absorptive Pad;Other (comment) 09/09/22 1237             Assessment:       1. Ulcer  of right great toe due to diabetes mellitus    2. Staph aureus infection    3. Group B streptococcal infection    4. Other acute osteomyelitis, unspecified ankle and foot    5. Ulcer of left second toe, limited to breakdown of skin    6. Diabetic polyneuropathy associated with type 2 diabetes mellitus    7. Type 2 diabetes mellitus with foot ulcer, without long-term current use of insulin    8. Morbid obesity    9. Essential hypertension, benign    10. Medically noncompliant    11. Bipolar II disorder           Right hallux DFU: first clinic visit 5/19/22, neg xray: recalcitrant, deterioration noted 8/12/22 :rx augmentin and doxycycline  cx 7/7/22:      0 Result Notes    Aerobic Culture - Tissue Many Streptococcus agalactiae (Group B) Abnormal        Many Methicillin Sensitive Staphylococcus aureus Abnormal             Resulting Agency: [facility]       Susceptibility     Streptococcus agalactiae (Group B) Methicillin Sensitive Staphylococcus aureus     Not Specified Not Specified     Ampicillin <=0.25  Sensitive       Clindamycin >=1  Resistant <=0.25  Sensitive     Erythromycin >=8  Resistant <=0.25  Sensitive     Gentamicin   <=0.5  Sensitive     Moxifloxacin 0.12  Sensitive       Oxacillin   <=0.25  Sensitive     Penicillin <=0.06  Sensitive >=0.5  Resistant     Tetracycline >=16  Resistant <=1  Sensitive     Trimethoprim/Sulfamethoxazole   <=10  Sensitive     Vancomycin 0.5  Sensitive <=0.5  Sensitive                         Prior left 2nd toe osteomyelitis with partial amputation by Dr CAMERON driscoll 2020, New ulcer left 2nd toe volar side: noted 6/9/22:better   diabetes, hba1c 9.1 April 2022, down to 7.3 July 20222  Morbid obesity with bmi 54  Hypertension   Hyperlipidemia  H/o depression/bipolar    Results for orders placed or performed during the hospital encounter of 07/20/22 (from the past 2160 hour(s))   MRI Foot (Forefoot) Left W W/O Contrast    Impression    The distal phalanx of the 2nd toe is absent.   Osteomyelitis is present to the middle phalanx of the 2nd toe and is associated with cellulitis.  No evidence for septic arthritis.    Subchondral collapse is present to the 2nd metatarsal head and is likely degenerative.    Marked atrophy of the intrinsic muscles      Electronically signed by: Mc Schuler  Date:    07/21/2022  Time:    10:09         Lab Results   Component Value Date    HGBA1C 7.3 (H) 07/13/2022     CMP  Sodium Level   Date Value Ref Range Status   07/13/2022 142 136 - 145 mmol/L Final     Potassium Level   Date Value Ref Range Status   07/13/2022 5.1 3.5 - 5.1 mmol/L Final     Carbon Dioxide   Date Value Ref Range Status   07/13/2022 27 22 - 29 mmol/L Final     Blood Urea Nitrogen   Date Value Ref Range Status   07/13/2022 19.1 8.4 - 25.7 mg/dL Final     Creatinine   Date Value Ref Range Status   07/13/2022 1.05 0.73 - 1.18 mg/dL Final     Calcium Level Total   Date Value Ref Range Status   07/13/2022 9.6 8.4 - 10.2 mg/dL Final     Albumin Level   Date Value Ref Range Status   07/13/2022 3.4 (L) 3.5 - 5.0 gm/dL Final     Bilirubin Total   Date Value Ref Range Status   07/13/2022 0.2 <=1.5 mg/dL Final     Alkaline Phosphatase   Date Value Ref Range Status   07/13/2022 69 40 - 150 unit/L Final     Aspartate Aminotransferase   Date Value Ref Range Status   07/13/2022 12 5 - 34 unit/L Final     Alanine Aminotransferase   Date Value Ref Range Status   07/13/2022 14 0 - 55 unit/L Final     Estimated GFR-Non    Date Value Ref Range Status   07/13/2022 >60 mls/min/1.73/m2 Final     Lab Results   Component Value Date    WBC 8.6 07/13/2022    HGB 11.6 (L) 07/13/2022    HCT 37.3 (L) 07/13/2022    MCV 96.9 (H) 07/13/2022     07/13/2022 7/13/22: Sed rate 11/crp 13      Plan:           Plan of Care:    His right great toe had a  deterioration of the ulcer on 8/12/22 : but better now; I had  rx another round of augmentin and doxy . We are stiil needing that MRI: he has rescheduled it  twice now  Prognosis guarded, ulcers waxes and wanes; his cbg consistently high   Monitor for any signs of infection: watch for increase drainage or pain, fevers, chills, swelling and report this to clinic immediately (the toe ulcer changed a few days ago but they did not call us)  Nutrition: Must have a high protein diet to support wound  healing; (if renal disease, see nephrologist for amount allowed):  this should be over 100g protein /day (if no kidney issues); Also rec MVI along with vit C, vit D, zinc and Sidney  Diabetes: recent a1c down to under 8 but has lots of high cbg readings , doesn't folllow good diabetic diet  Continue offloading with jojo for now;he says he doesn't get up much; rec rolling knee scooter  Return to clinic 1 week

## 2022-09-09 NOTE — PATIENT INSTRUCTIONS
Pt seen today by:     Home health and self care DRESSING INSTRUCTIONS:      Wound location: Right Great Toe     Dressings to be changed every other day and as needed if soiled or not intact.  Cleanse wound with wound cleanser or saline  Apply therahoney gel to the wound bed  Cover with exudry and secure with a shaka/cover roll tape  Wear Darco shoe    Return to clinic on Friday, Sept 16, 2022 at 11:00 am    Wound may have been debrided in clinic: if so, WHAT YOU NEED TO KNOW:    Debridement is the removal of infected, damaged, or dead tissue so a wound can heal properly. Your wound may need more than one debridement. Debridement can cause bleeding, and a small amount of blood is expected.  AFTER A DEBRIDEMENT:  Keep your wound clean and dry. Do not remove the dressing unless instructed.  Follow the wound care orders provided to you or your home health care provider.  If you have pain, take over the counter pain relievers or pain medication if prescribed.  Elevate the wound and limit excessive activity to prevent bleeding and/or swelling in your wound.  If you see blood coming through the dressing, apply gauze and tape over the dressing and hold firm pressure to the wound with your hand for 5-10 minutes continuously, without peeking, to help the bleeding stop.  Contact [facility] wound care team at [phone] or go to the nearest Emergency department if:  You have a fever greater than 101 taken by mouth.  Your pain gets worse or does not go away, even after taking your regular pain medicine.  Your skin around your wound is red, hot, swollen, or draining pus.  You have bleeding that continues to come through the dressing after holding pressure for 10 minutes   The current daily value (%DV) for protein is 50 grams per day and is meant as a general goal for most people. Further increasing your dietary protein intake is very important for wound healing. Typically one needs over 100g of protein per day to help  with wound healing needs.  If you are a dialysis patient or have problems with your kidneys, talk to your Nephrologist about how much protein you can take in with your condition.  Examples of high protein items that can be added to your diet include: eggs, chicken, red meats, almonds, cottage cheese, Greek yogurt, beans, and peanut butter.  Fortified protein bars, shakes and drinks can add 15-30 additional grams of protein per serving.   Also add:   1 daily general multivitamin   Sidney : 1 packet twice daily   Vitamin C : 500mg twice daily   Zinc 220 mg daily  Vit D : once daily    Call our [facility] wound clinic for questions/concerns a [phone] .

## 2022-09-12 NOTE — PROCEDURES
"Debridement    Date/Time: 9/9/2022 10:30 AM  Performed by: Soheila Vidal MD  Authorized by: Soheila Vidal MD   Associated wounds:        Wound 05/19/22 1340 Diabetic Ulcer Right medial;plantar Toe, first #1  Time out: Immediately prior to procedure a "time out" was called to verify the correct patient, procedure, equipment, support staff and site/side marked as required.    Consent Done?:  Yes (Written)    Preparation: Patient was prepped and draped in usual sterile fashion    Local anesthesia used?: Yes    Local anesthetic:  Topical anesthetic    Wound Details:    Location:  Right foot    Location:  Right 1st Toe    Type of Debridement:  Excisional       Length (cm):  1       Area (sq cm):  1.5       Width (cm):  1.5       Percent Debrided (%):  100       Depth (cm):  0.1       Total Area Debrided (sq cm):  1.5    Depth of debridement:  Subcutaneous tissue    Tissue debrided:  Dermis and Subcutaneous    Devitalized tissue debrided:  Biofilm, Slough and Callus    Instruments:  Curette    Bleeding:  Minimal  Hemostasis Achieved: Yes    Method Used:  Pressure  Patient tolerance:  Patient tolerated the procedure well with no immediate complications  "

## 2022-10-05 ENCOUNTER — HOSPITAL ENCOUNTER (OUTPATIENT)
Dept: RADIOLOGY | Facility: HOSPITAL | Age: 52
Discharge: HOME | End: 2022-10-05
Attending: EMERGENCY MEDICINE
Payer: MEDICARE

## 2022-10-05 PROCEDURE — 73720 MRI LWR EXTREMITY W/O&W/DYE: CPT | Mod: TC,RT

## 2022-10-05 PROCEDURE — 25500020 PHARM REV CODE 255: Performed by: EMERGENCY MEDICINE

## 2022-10-05 PROCEDURE — A9577 INJ MULTIHANCE: HCPCS | Performed by: EMERGENCY MEDICINE

## 2022-10-05 RX ADMIN — GADOBENATE DIMEGLUMINE 20 ML: 529 INJECTION, SOLUTION INTRAVENOUS at 11:10

## 2022-10-07 ENCOUNTER — HOSPITAL ENCOUNTER (OUTPATIENT)
Dept: WOUND CARE | Facility: HOSPITAL | Age: 52
Discharge: HOME | End: 2022-10-07
Attending: EMERGENCY MEDICINE
Payer: MEDICARE

## 2022-10-07 VITALS
TEMPERATURE: 98 F | HEART RATE: 77 BPM | HEIGHT: 70 IN | DIASTOLIC BLOOD PRESSURE: 84 MMHG | SYSTOLIC BLOOD PRESSURE: 175 MMHG | BODY MASS INDEX: 45.1 KG/M2 | RESPIRATION RATE: 20 BRPM | WEIGHT: 315 LBS

## 2022-10-07 DIAGNOSIS — E11.42 DIABETIC POLYNEUROPATHY ASSOCIATED WITH TYPE 2 DIABETES MELLITUS: ICD-10-CM

## 2022-10-07 DIAGNOSIS — E11.621 ULCER OF RIGHT GREAT TOE DUE TO DIABETES MELLITUS: Primary | ICD-10-CM

## 2022-10-07 DIAGNOSIS — F31.81 BIPOLAR II DISORDER: ICD-10-CM

## 2022-10-07 DIAGNOSIS — L97.509 TYPE 2 DIABETES MELLITUS WITH FOOT ULCER, WITHOUT LONG-TERM CURRENT USE OF INSULIN: ICD-10-CM

## 2022-10-07 DIAGNOSIS — A49.1 GROUP B STREPTOCOCCAL INFECTION: ICD-10-CM

## 2022-10-07 DIAGNOSIS — Z91.199 MEDICALLY NONCOMPLIANT: ICD-10-CM

## 2022-10-07 DIAGNOSIS — E66.01 MORBID OBESITY: ICD-10-CM

## 2022-10-07 DIAGNOSIS — M86.179 OTHER ACUTE OSTEOMYELITIS, UNSPECIFIED ANKLE AND FOOT: ICD-10-CM

## 2022-10-07 DIAGNOSIS — L97.521 ULCER OF LEFT SECOND TOE, LIMITED TO BREAKDOWN OF SKIN: ICD-10-CM

## 2022-10-07 DIAGNOSIS — I10 ESSENTIAL HYPERTENSION, BENIGN: ICD-10-CM

## 2022-10-07 DIAGNOSIS — L97.519 ULCER OF RIGHT GREAT TOE DUE TO DIABETES MELLITUS: Primary | ICD-10-CM

## 2022-10-07 DIAGNOSIS — A49.01 STAPH AUREUS INFECTION: ICD-10-CM

## 2022-10-07 DIAGNOSIS — E11.621 TYPE 2 DIABETES MELLITUS WITH FOOT ULCER, WITHOUT LONG-TERM CURRENT USE OF INSULIN: ICD-10-CM

## 2022-10-07 LAB — POCT GLUCOSE: 247 MG/DL (ref 70–110)

## 2022-10-07 PROCEDURE — 27000999 HC MEDICAL RECORD PHOTO DOCUMENTATION

## 2022-10-07 PROCEDURE — 11042 DBRDMT SUBQ TIS 1ST 20SQCM/<: CPT

## 2022-10-07 RX ORDER — SEMAGLUTIDE 1.34 MG/ML
0.5 INJECTION, SOLUTION SUBCUTANEOUS
COMMUNITY

## 2022-10-07 NOTE — PROGRESS NOTES
Subjective:       Patient ID: Jong Clayton is a 52 y.o. male.    Chief Complaint: Diabetic Foot Ulcer (Type 2 diabetes mellitus with foot ulcer, without long-term current use of insulin)    53 y/o morbidly obese WM with diabetes, hypertension, dyslipidemia, bipolar and depression along with prior DFU's with osteomyelitis (left 2nd toe 2019 and again 2020). I did treat him when he had this issue and noted then he was noncompliant with my recommendations. I last saw him for that left 2nd toe on 3/9/20 where he had a bulbous left 2nd hammer toe with +MRI for osteo. He never returned as scheduled but ultimately followed through with my prior recs of distal tip amputation.    He returned to clinic in mid May 2022 complaining of a chronic right plantar great toe ulcer present since Dec 2021 that he tried to self treat. His wife finally got him to agree to come to clinic but he remains very inconsistent with his regularly scheduled visits here. The right hallux ulcer was stagnant and  I told him I was extremely concerned about osteomyelitis on the right great toe despite having a negative x-ray.  I ordered an MRI . He has been given antibiotics for +tissue cx for MSSA and streptococcus on 7/7/22 for which I put him on oral augmentin and doxy while we waited for MRI  In the meantime, he developed another volar toe ulcer on left 2nd toe (toe with prior tip amputation/osteo) in early June 2022.    MRI was done on this left foot instead of the right foot but it still shows osteomyelitis of the remaining middle phalanx.  The right hallux ulcer looked better on 8/5/22 but returned on 8/12/22 with a significant deterioration of this toe. I felt we needed to proceed with MRI and I ordered a dose of IV Dalvance. I also gave rx of augmentin and doxycycline.   He was to return the following week but did not.  He came in on 8/26/22: told me he cancelled MRI but was going to reschedule it; the toe overall a bit better; he was almost  done with the oral antibiotics ; still not getting approval for IV Dalvance    He has missed a multitude appointments.  He has not been here in a month but finally comes back in today on 10/7/22. He finally got the MRI a few days ago: no osteomyelitis; he is anxious to have his stimulator battery replaced. No fever/chills; has been running high cbg's so primary adjusted meds      Review of Systems   Constitutional:  Positive for fatigue. Negative for chills and fever.   HENT: Negative.     Respiratory: Negative.     Cardiovascular: Negative.    Gastrointestinal: Negative.    Musculoskeletal: Negative.    Skin:  Positive for wound (see hpi).   Neurological:  Positive for numbness (to both feet). Negative for seizures.       Objective:       Vitals:    10/07/22 1103   BP: (!) 175/84   Pulse: 77   Resp: 20   Temp: 98 °F (36.7 °C)       Recent Labs   Lab 10/07/22  1110   POCTGLUCOSE 247*            Physical Exam  Constitutional:       Appearance: He is morbidly obese.   HENT:      Head: Normocephalic and atraumatic.   Eyes:      Conjunctiva/sclera: Conjunctivae normal.   Cardiovascular:      Pulses: Normal pulses.           Dorsalis pedis pulses are 2+ on the right side and 2+ on the left side.      Comments: +hair on legs  Musculoskeletal:      Right lower leg: No edema.      Left lower leg: No edema.        Feet:    Feet:      Comments: Hallux valgus deformity noted  Skin:     Capillary Refill: Capillary refill takes less than 2 seconds.   Neurological:      General: No focal deficit present.      Mental Status: He is alert and oriented to person, place, and time. Mental status is at baseline.                    Assessment:       1. Ulcer of right great toe due to diabetes mellitus    2. Staph aureus infection    3. Group B streptococcal infection    4. Ulcer of left second toe, limited to breakdown of skin    5. Other acute osteomyelitis, unspecified ankle and foot    6. Diabetic polyneuropathy associated with type 2  diabetes mellitus    7. Type 2 diabetes mellitus with foot ulcer, without long-term current use of insulin    8. Morbid obesity    9. Medically noncompliant    10. Essential hypertension, benign    11. Bipolar II disorder         Right hallux DFU: first clinic visit 5/19/22, neg xray: recalcitrant,   Right foot MRI 10/5/22: no osteomyelitis  Prior left 2nd toe osteomyelitis with partial amputation by Dr CAMERON driscoll 2020, New ulcer left 2nd toe volar side: noted 6/9/22: closed    diabetes, hba1c 9.1 April 2022, down to 7.3 July 2022  Morbid obesity with bmi 54  Hypertension   Hyperlipidemia  H/o depression/bipolar  Medical noncompliance    Results for orders placed or performed during the hospital encounter of 08/12/22 (from the past 2160 hour(s))   MRI Foot (Forefoot) Right W W/O Contrast    Impression    The middle phalanx of the 2nd toe and the distal phalanges of the 3rd, 4th, and 5th toes are obscured by poor fat saturation.  Osteomyelitis is considered unlikely but cannot be entirely excluded.    An ulcer is present to the base of the distal phalanx of the great toe.  No evidence for osteomyelitis is present to the distal phalanx of the great toe.  No MR evidence is present to suggest septic arthritis.    Interval metal artifact noted to the plantar aspect of the forefoot.  Has the patient had recent surgical instrumentation or a metallic foreign body?      Electronically signed by: Mc Schuler  Date:    10/05/2022  Time:    14:04   Results for orders placed or performed during the hospital encounter of 07/20/22 (from the past 2160 hour(s))   MRI Foot (Forefoot) Left W W/O Contrast    Impression    The distal phalanx of the 2nd toe is absent.  Osteomyelitis is present to the middle phalanx of the 2nd toe and is associated with cellulitis.  No evidence for septic arthritis.    Subchondral collapse is present to the 2nd metatarsal head and is likely degenerative.    Marked atrophy of the intrinsic  muscles      Electronically signed by: Mc Schuler  Date:    07/21/2022  Time:    10:09         Plan:           Plan of Care:    His right great toe had a deterioration of the ulcer in mid August but better now; he did take anther round of antibiotics ; his inconsistency with coming to the clinic is his biggest issue I think and interfering in his healing.  He goes many many weeks and keeps cancelling appointments and rescheduling.  At least I am grateful that the recent MRI he finally had done did not reveal any osteomyelitis.  He battles ongoing hyperglycemia but says his medications were recently adjusted.  He has a lot of comorbidities that are inhibiting his wound healing   Prognosis guarded, ulcers waxes and wanes; his cbg consistently high   Monitor for any signs of infection: watch for increase drainage or pain, fevers, chills, swelling and report this to clinic immediately (the toe ulcer changed a few days ago but they did not call us)  Nutrition: Must have a high protein diet to support wound  healing; (if renal disease, see nephrologist for amount allowed):  this should be over 100g protein /day (if no kidney issues); Also rec MVI along with vit C, vit D, zinc and Sidney  Diabetes: recent a1c down to under 8 but has lots of high cbg readings , doesn't folllow good diabetic diet  Continue offloading with jojo for now;he says he doesn't get up much and recently see also his back is been giving him a lot of problems and hopes to get an MRI of his spine ordered by his PCP.  Return to clinic 1 week

## 2022-10-07 NOTE — PATIENT INSTRUCTIONS
Pt seen today by:     Home health and self care DRESSING INSTRUCTIONS:      Wound location: Right Great Toe     Dressings to be changed every other day and as needed if soiled or not intact.  Cleanse wound with wound cleanser or saline  Apply therahoney gel to the wound bed  Cover with exudry and secure with a shaka/cover roll tape  Wear Darco shoe    Return to clinic on Friday, Oct 14, 2022 at 11:00 am    Wound may have been debrided in clinic: if so, WHAT YOU NEED TO KNOW:    Debridement is the removal of infected, damaged, or dead tissue so a wound can heal properly. Your wound may need more than one debridement. Debridement can cause bleeding, and a small amount of blood is expected.  AFTER A DEBRIDEMENT:  Keep your wound clean and dry. Do not remove the dressing unless instructed.  Follow the wound care orders provided to you or your home health care provider.  If you have pain, take over the counter pain relievers or pain medication if prescribed.  Elevate the wound and limit excessive activity to prevent bleeding and/or swelling in your wound.  If you see blood coming through the dressing, apply gauze and tape over the dressing and hold firm pressure to the wound with your hand for 5-10 minutes continuously, without peeking, to help the bleeding stop.  Contact [facility] wound care team at [phone] or go to the nearest Emergency department if:  You have a fever greater than 101 taken by mouth.  Your pain gets worse or does not go away, even after taking your regular pain medicine.  Your skin around your wound is red, hot, swollen, or draining pus.  You have bleeding that continues to come through the dressing after holding pressure for 10 minutes   The current daily value (%DV) for protein is 50 grams per day and is meant as a general goal for most people. Further increasing your dietary protein intake is very important for wound healing. Typically one needs over 100g of protein per day to help  with wound healing needs.  If you are a dialysis patient or have problems with your kidneys, talk to your Nephrologist about how much protein you can take in with your condition.  Examples of high protein items that can be added to your diet include: eggs, chicken, red meats, almonds, cottage cheese, Greek yogurt, beans, and peanut butter.  Fortified protein bars, shakes and drinks can add 15-30 additional grams of protein per serving.   Also add:   1 daily general multivitamin   Sidney : 1 packet twice daily   Vitamin C : 500mg twice daily   Zinc 220 mg daily  Vit D : once daily    Call our [facility] wound clinic for questions/concerns a [phone] .

## 2022-10-10 NOTE — PROCEDURES
"Debridement    Date/Time: 10/7/2022 10:45 AM  Performed by: Soheila Vidal MD  Authorized by: Soheila Vidal MD   Associated wounds:        (Retired) Wound 05/19/22 1340 Diabetic Ulcer Right medial;plantar Toe, first #1  Time out: Immediately prior to procedure a "time out" was called to verify the correct patient, procedure, equipment, support staff and site/side marked as required.    Consent Done?:  Yes (Written)    Preparation: Patient was prepped and draped in usual sterile fashion    Local anesthesia used?: Yes    Local anesthetic:  Topical anesthetic    Wound Details:    Location:  Right foot    Location:  Right 1st Toe    Type of Debridement:  Excisional       Length (cm):  0.8       Area (sq cm):  1.2       Width (cm):  1.5       Percent Debrided (%):  100       Depth (cm):  0.4       Total Area Debrided (sq cm):  1.2    Depth of debridement:  Subcutaneous tissue    Tissue debrided:  Dermis, Epidermis and Subcutaneous    Devitalized tissue debrided:  Biofilm, Callus, Slough and Necrotic/Eschar    Instruments:  Curette    Bleeding:  Minimal  Hemostasis Achieved: Yes    Method Used:  Pressure  Patient tolerance:  Patient tolerated the procedure well with no immediate complications  "

## 2022-10-20 ENCOUNTER — HOSPITAL ENCOUNTER (OUTPATIENT)
Dept: WOUND CARE | Facility: HOSPITAL | Age: 52
Discharge: HOME | End: 2022-10-20
Attending: EMERGENCY MEDICINE
Payer: MEDICARE

## 2022-10-20 VITALS
HEART RATE: 82 BPM | TEMPERATURE: 98 F | SYSTOLIC BLOOD PRESSURE: 167 MMHG | WEIGHT: 315 LBS | DIASTOLIC BLOOD PRESSURE: 81 MMHG | RESPIRATION RATE: 18 BRPM | HEIGHT: 70 IN | BODY MASS INDEX: 45.1 KG/M2

## 2022-10-20 DIAGNOSIS — A49.1 GROUP B STREPTOCOCCAL INFECTION: ICD-10-CM

## 2022-10-20 DIAGNOSIS — E11.621 ULCER OF RIGHT GREAT TOE DUE TO DIABETES MELLITUS: Primary | ICD-10-CM

## 2022-10-20 DIAGNOSIS — L97.519 ULCER OF RIGHT GREAT TOE DUE TO DIABETES MELLITUS: Primary | ICD-10-CM

## 2022-10-20 DIAGNOSIS — A49.01 STAPH AUREUS INFECTION: ICD-10-CM

## 2022-10-20 DIAGNOSIS — E66.01 MORBID OBESITY: ICD-10-CM

## 2022-10-20 DIAGNOSIS — E11.42 DIABETIC POLYNEUROPATHY ASSOCIATED WITH TYPE 2 DIABETES MELLITUS: ICD-10-CM

## 2022-10-20 PROCEDURE — 27000999 HC MEDICAL RECORD PHOTO DOCUMENTATION

## 2022-10-20 PROCEDURE — 11042 DBRDMT SUBQ TIS 1ST 20SQCM/<: CPT

## 2022-10-20 NOTE — PROGRESS NOTES
Subjective:       Patient ID: Jong Clayton is a 52 y.o. male.    Chief Complaint: Diabetic Foot Ulcer (Type 2 diabetes mellitus with foot ulcer, without long-term current use of insulin)    51 y/o morbidly obese WM with diabetes, hypertension, dyslipidemia, bipolar and depression along with prior DFU's with osteomyelitis (left 2nd toe 2019 and again 2020). I did treat him when he had this issue and noted then he was noncompliant with my recommendations. I last saw him for that left 2nd toe on 3/9/20 where he had a bulbous left 2nd hammer toe with +MRI for osteo. He never returned as scheduled but ultimately followed through with my prior recs of distal tip amputation.    He returned to clinic in mid May 2022 complaining of a chronic right plantar great toe ulcer present since Dec 2021 that he tried to self treat. His wife finally got him to agree to come to clinic but he remains very inconsistent with his regularly scheduled visits here. The right hallux ulcer was stagnant and  I told him I was extremely concerned about osteomyelitis on the right great toe despite having a negative x-ray.  I ordered an MRI . He has been given antibiotics for +tissue cx for MSSA and streptococcus on 7/7/22 for which I put him on oral augmentin and doxy while we waited for MRI  In the meantime, he developed another volar toe ulcer on left 2nd toe (toe with prior tip amputation/osteo) in early June 2022.    MRI was done on this left foot instead of the right foot but it still shows osteomyelitis of the remaining  left 2nd toe middle phalanx (but no wound there now).  The right hallux ulcer looked better on 8/5/22 but returned on 8/12/22 with a significant deterioration of this toe. I felt we needed to proceed with MRI and I ordered a dose of IV Dalvance (which insurance didn't approve). I also gave rx of augmentin and doxycycline.  Since then, he cancelled many appointments here as well as with MRI. He finally comes back to clinic on  10/7/22 and finally got the MRI: no osteomyelitis      Review of Systems   Constitutional:  Positive for fatigue. Negative for chills and fever.   HENT: Negative.     Respiratory: Negative.     Cardiovascular: Negative.    Gastrointestinal: Negative.    Musculoskeletal: Negative.    Skin:  Positive for wound (see hpi).   Neurological:  Positive for numbness (to both feet). Negative for seizures.       Objective:       Vitals:    10/20/22 1306   BP: (!) 167/81   Pulse: 82   Resp: 18   Temp: 97.9 °F (36.6 °C)     No results for input(s): POCTGLUCOSE in the last 24 hours.         Physical Exam  Constitutional:       Appearance: He is morbidly obese.   HENT:      Head: Normocephalic and atraumatic.   Eyes:      Conjunctiva/sclera: Conjunctivae normal.   Cardiovascular:      Pulses: Normal pulses.           Dorsalis pedis pulses are 2+ on the right side and 2+ on the left side.      Comments: +hair on legs  Musculoskeletal:      Right lower leg: No edema.      Left lower leg: No edema.        Feet:    Feet:      Comments: Hallux valgus deformity noted  Skin:     Capillary Refill: Capillary refill takes less than 2 seconds.   Neurological:      General: No focal deficit present.      Mental Status: He is alert and oriented to person, place, and time. Mental status is at baseline.            (Retired) Wound 05/19/22 1340 Diabetic Ulcer Right medial;plantar Toe, first #1 (Active)   05/19/22 1340    Pre-existing: Yes   Primary Wound Type: Diabetic ulc   Side: Right   Orientation: medial;plantar   Location: Toe, first   Wound Number: #1   Ankle-Brachial Index: Non Compressable 10/07/22   Pulses: palpable/ doopler biphasic   Removal Indication and Assessment:    Wound Outcome:    (Retired) Wound Type:    (Retired) Wound Length (cm):    (Retired) Wound Width (cm):    (Retired) Depth (cm):    Wound Description (Comments):    Removal Indications:    Wound Image   10/20/22 1324   Dressing Appearance Intact;Moist drainage 10/20/22  1324   Drainage Amount Moderate 10/20/22 1324   Drainage Characteristics/Odor Yellow;Serosanguineous 10/20/22 1324   Appearance Pink 10/20/22 1324   Tissue loss description Full thickness 10/20/22 1324   Black (%), Wound Tissue Color 0 % 10/20/22 1324   Red (%), Wound Tissue Color 100 % 10/20/22 1324   Yellow (%), Wound Tissue Color 0 % 10/20/22 1324   Periwound Area Intact 10/20/22 1324   Wound Edges Callused 10/20/22 1324   Wound Length (cm) 0.5 cm 10/20/22 1324   Wound Width (cm) 0.4 cm 10/20/22 1324   Wound Depth (cm) 0.3 cm 10/20/22 1324   Wound Volume (cm^3) 0.06 cm^3 10/20/22 1324   Wound Surface Area (cm^2) 0.2 cm^2 10/20/22 1324   Undermining (depth (cm)/location) 0.3 between  8-10:00 10/20/22 1324   Care Cleansed with:;Soap and water;Wound cleanser;Applied: 10/20/22 1324       [REMOVED]      (Retired) Wound 08/26/22 Diabetic Ulcer Right medial Toe, first #3 (Removed)   08/26/22     Pre-existing:    Primary Wound Type: Diabetic ulc   Side: Right   Orientation: medial   Location: Toe, first   Wound Number: #3   Ankle-Brachial Index:    Pulses:    Removal Indication and Assessment:    Wound Outcome: Healed   (Retired) Wound Type:    (Retired) Wound Length (cm):    (Retired) Wound Width (cm):    (Retired) Depth (cm):    Wound Description (Comments):    Removal Indications:    Removed 10/20/22 1327   Wound Image   10/20/22 1326   Dressing Appearance Dry;Clean 10/20/22 1326   Drainage Amount None 10/20/22 1326   Appearance Epithelialization 10/20/22 1326   Tissue loss description Not applicable 10/20/22 1326   Black (%), Wound Tissue Color 0 % 10/20/22 1326   Red (%), Wound Tissue Color 0 % 10/20/22 1326   Yellow (%), Wound Tissue Color 0 % 10/20/22 1326   Periwound Area Intact 10/20/22 1326   Wound Edges Defined 10/20/22 1326   Wound Length (cm) 0 cm 10/20/22 1326   Wound Width (cm) 0 cm 10/20/22 1326   Wound Depth (cm) 0 cm 10/20/22 1326   Wound Volume (cm^3) 0 cm^3 10/20/22 1326   Wound Surface Area (cm^2) 0  cm^2 10/20/22 1326   Care Cleansed with:;Soap and water 10/20/22 1326   Dressing Removed;Changed;Honey;Bandaid;Rolled gauze 10/20/22 1326               Assessment:       1. Ulcer of right great toe due to diabetes mellitus    2. Staph aureus infection    3. Group B streptococcal infection    4. Diabetic polyneuropathy associated with type 2 diabetes mellitus    5. Morbid obesity         Right hallux DFU: first clinic visit 5/19/22, neg xray: recalcitrant to healing   Right foot MRI 10/5/22: no osteomyelitis  Prior left 2nd toe osteomyelitis with partial amputation by Dr CAMERON driscoll 2020, New ulcer left 2nd toe volar side: noted 6/9/22: closed    diabetes, hba1c 9.1 April 2022, down to 7.3 July 2022  Morbid obesity with bmi 54  Hypertension   Hyperlipidemia  H/o depression/bipolar  Medical noncompliance    Results for orders placed or performed during the hospital encounter of 08/12/22 (from the past 2160 hour(s))   MRI Foot (Forefoot) Right W W/O Contrast    Impression    The middle phalanx of the 2nd toe and the distal phalanges of the 3rd, 4th, and 5th toes are obscured by poor fat saturation.  Osteomyelitis is considered unlikely but cannot be entirely excluded.    An ulcer is present to the base of the distal phalanx of the great toe.  No evidence for osteomyelitis is present to the distal phalanx of the great toe.  No MR evidence is present to suggest septic arthritis.    Interval metal artifact noted to the plantar aspect of the forefoot.  Has the patient had recent surgical instrumentation or a metallic foreign body?      Electronically signed by: Mc Schuler  Date:    10/05/2022  Time:    14:04         Plan:           Plan of Care:    His right great toe had a deterioration of the ulcer in mid August but better now; he did take anther round of antibiotics ; his inconsistency with coming to the clinic is his biggest issue I think which interferes with his healing. Prognosis guarded, ulcers waxes and wanes; his  cbg consistently high  Debrided the ulcer  Nutrition: Must have a high protein diet to support wound  healing; (if renal disease, see nephrologist for amount allowed):  this should be over 100g protein /day (if no kidney issues); Also rec MVI along with vit C, vit D, zinc and Sidney  Diabetes: recent a1c down to under 8 but has lots of high cbg readings , doesn't folllow good diabetic diet  Continue offloading with jojo for now;he says he doesn't get up much   Return to clinic 1 week

## 2022-10-20 NOTE — PATIENT INSTRUCTIONS
Pt seen today by:     Home health and self care DRESSING INSTRUCTIONS:      Wound location: Right Great Toe     Dressings to be changed every other day and as needed if soiled or not intact.  Cleanse wound with wound cleanser or saline  Apply therahoney gel to the wound bed  Cover with exudry and secure with a shaka/cover roll tape  Wear Darco shoe    Return to clinic on Friday, Oct 27th, 2022 at 10:00 am    Wound may have been debrided in clinic: if so, WHAT YOU NEED TO KNOW:    Debridement is the removal of infected, damaged, or dead tissue so a wound can heal properly. Your wound may need more than one debridement. Debridement can cause bleeding, and a small amount of blood is expected.  AFTER A DEBRIDEMENT:  Keep your wound clean and dry. Do not remove the dressing unless instructed.  Follow the wound care orders provided to you or your home health care provider.  If you have pain, take over the counter pain relievers or pain medication if prescribed.  Elevate the wound and limit excessive activity to prevent bleeding and/or swelling in your wound.  If you see blood coming through the dressing, apply gauze and tape over the dressing and hold firm pressure to the wound with your hand for 5-10 minutes continuously, without peeking, to help the bleeding stop.  Contact [facility] wound care team at [phone] or go to the nearest Emergency department if:  You have a fever greater than 101 taken by mouth.  Your pain gets worse or does not go away, even after taking your regular pain medicine.  Your skin around your wound is red, hot, swollen, or draining pus.  You have bleeding that continues to come through the dressing after holding pressure for 10 minutes   The current daily value (%DV) for protein is 50 grams per day and is meant as a general goal for most people. Further increasing your dietary protein intake is very important for wound healing. Typically one needs over 100g of protein per day to help  with wound healing needs.  If you are a dialysis patient or have problems with your kidneys, talk to your Nephrologist about how much protein you can take in with your condition.  Examples of high protein items that can be added to your diet include: eggs, chicken, red meats, almonds, cottage cheese, Greek yogurt, beans, and peanut butter.  Fortified protein bars, shakes and drinks can add 15-30 additional grams of protein per serving.   Also add:   1 daily general multivitamin   Sidney : 1 packet twice daily   Vitamin C : 500mg twice daily   Zinc 220 mg daily  Vit D : once daily    Call our [facility] wound clinic for questions/concerns a [phone] .

## 2022-10-21 LAB — POCT GLUCOSE: 252 MG/DL (ref 70–110)

## 2022-10-24 NOTE — PROCEDURES
"Debridement    Date/Time: 10/20/2022 12:48 PM  Performed by: Soheila Vidal MD  Authorized by: Soheila Vidal MD   Associated wounds:        (Retired) Wound 05/19/22 1340 Diabetic Ulcer Right medial;plantar Toe, first #1  Time out: Immediately prior to procedure a "time out" was called to verify the correct patient, procedure, equipment, support staff and site/side marked as required.    Consent Done?:  Yes (Written)    Preparation: Patient was prepped and draped in usual sterile fashion    Local anesthesia used?: Yes    Local anesthetic:  Topical anesthetic    Wound Details:    Location:  Right foot    Location:  Right 1st Toe    Type of Debridement:  Excisional       Length (cm):  0.5       Area (sq cm):  0.2       Width (cm):  0.4       Percent Debrided (%):  100       Depth (cm):  0.3       Total Area Debrided (sq cm):  0.2    Depth of debridement:  Subcutaneous tissue    Tissue debrided:  Epidermis, Subcutaneous and Dermis    Devitalized tissue debrided:  Biofilm, Necrotic/Eschar and Slough    Instruments:  Curette    Bleeding:  Minimal  Hemostasis Achieved: Yes    Method Used:  Pressure  Patient tolerance:  Patient tolerated the procedure well with no immediate complications  "

## 2022-11-22 ENCOUNTER — HOSPITAL ENCOUNTER (OUTPATIENT)
Dept: WOUND CARE | Facility: HOSPITAL | Age: 52
Discharge: HOME | End: 2022-11-22
Attending: EMERGENCY MEDICINE
Payer: MEDICARE

## 2022-11-22 VITALS
HEART RATE: 80 BPM | WEIGHT: 315 LBS | HEIGHT: 70 IN | BODY MASS INDEX: 45.1 KG/M2 | RESPIRATION RATE: 20 BRPM | SYSTOLIC BLOOD PRESSURE: 129 MMHG | TEMPERATURE: 98 F | DIASTOLIC BLOOD PRESSURE: 70 MMHG

## 2022-11-22 DIAGNOSIS — E11.621 TYPE 2 DIABETES MELLITUS WITH FOOT ULCER, WITHOUT LONG-TERM CURRENT USE OF INSULIN: ICD-10-CM

## 2022-11-22 DIAGNOSIS — A49.01 STAPH AUREUS INFECTION: ICD-10-CM

## 2022-11-22 DIAGNOSIS — E66.01 MORBID OBESITY: ICD-10-CM

## 2022-11-22 DIAGNOSIS — F31.81 BIPOLAR II DISORDER: ICD-10-CM

## 2022-11-22 DIAGNOSIS — A49.1 GROUP B STREPTOCOCCAL INFECTION: ICD-10-CM

## 2022-11-22 DIAGNOSIS — Z91.199 MEDICALLY NONCOMPLIANT: ICD-10-CM

## 2022-11-22 DIAGNOSIS — I10 ESSENTIAL HYPERTENSION, BENIGN: ICD-10-CM

## 2022-11-22 DIAGNOSIS — L97.519 ULCER OF RIGHT GREAT TOE DUE TO DIABETES MELLITUS: ICD-10-CM

## 2022-11-22 DIAGNOSIS — L03.031 CELLULITIS OF GREAT TOE OF RIGHT FOOT: Primary | ICD-10-CM

## 2022-11-22 DIAGNOSIS — E11.42 DIABETIC POLYNEUROPATHY ASSOCIATED WITH TYPE 2 DIABETES MELLITUS: ICD-10-CM

## 2022-11-22 DIAGNOSIS — L97.509 TYPE 2 DIABETES MELLITUS WITH FOOT ULCER, WITHOUT LONG-TERM CURRENT USE OF INSULIN: ICD-10-CM

## 2022-11-22 DIAGNOSIS — E11.621 ULCER OF RIGHT GREAT TOE DUE TO DIABETES MELLITUS: ICD-10-CM

## 2022-11-22 PROCEDURE — 11042 DBRDMT SUBQ TIS 1ST 20SQCM/<: CPT

## 2022-11-22 PROCEDURE — 11042 DEBRIDEMENT: ICD-10-PCS | Mod: NSCH,,, | Performed by: EMERGENCY MEDICINE

## 2022-11-22 PROCEDURE — 99213 OFFICE O/P EST LOW 20 MIN: CPT

## 2022-11-22 PROCEDURE — 11042 DBRDMT SUBQ TIS 1ST 20SQCM/<: CPT | Mod: NSCH,,, | Performed by: EMERGENCY MEDICINE

## 2022-11-22 PROCEDURE — 99213 OFFICE O/P EST LOW 20 MIN: CPT | Mod: NSCH,25,, | Performed by: EMERGENCY MEDICINE

## 2022-11-22 PROCEDURE — 99213 PR OFFICE/OUTPT VISIT, EST, LEVL III, 20-29 MIN: ICD-10-PCS | Mod: NSCH,25,, | Performed by: EMERGENCY MEDICINE

## 2022-11-22 PROCEDURE — 27000999 HC MEDICAL RECORD PHOTO DOCUMENTATION

## 2022-11-22 RX ORDER — SULFAMETHOXAZOLE AND TRIMETHOPRIM 400; 80 MG/1; MG/1
1 TABLET ORAL EVERY 12 HOURS
Qty: 28 TABLET | Refills: 0 | Status: SHIPPED | OUTPATIENT
Start: 2022-11-22 | End: 2022-12-06

## 2022-11-22 RX ORDER — GENTAMICIN SULFATE 1 MG/G
OINTMENT TOPICAL DAILY
Qty: 15 G | Refills: 0 | Status: SHIPPED | OUTPATIENT
Start: 2022-11-22

## 2022-11-22 RX ORDER — AMOXICILLIN AND CLAVULANATE POTASSIUM 875; 125 MG/1; MG/1
1 TABLET, FILM COATED ORAL EVERY 12 HOURS
Qty: 28 TABLET | Refills: 0 | Status: SHIPPED | OUTPATIENT
Start: 2022-11-22 | End: 2022-12-06

## 2022-11-22 NOTE — PATIENT INSTRUCTIONS
Pt seen today by:       We will send in some gentamicin ointment/cream and oral antibiotics to [address] in [facility] care DRESSING INSTRUCTIONS:      Wound location: Right Great Toe     Dressings to be changed every other day and as needed if soiled or not intact.  Cleanse wound with wound cleanser or saline  Apply gentamicin ointment/cream to the wound bed  Cover with alginate silver  Cover with abd pad and secure with a shaka/cover roll tape  Wear Darco shoe  Change dressing daily    Return to clinic on Tuesday, November 29, 2022 at 11:00 am    Wound may have been debrided in clinic: if so, WHAT YOU NEED TO KNOW:    Debridement is the removal of infected, damaged, or dead tissue so a wound can heal properly. Your wound may need more than one debridement. Debridement can cause bleeding, and a small amount of blood is expected.  AFTER A DEBRIDEMENT:  Keep your wound clean and dry. Do not remove the dressing unless instructed.  Follow the wound care orders provided to you or your home health care provider.  If you have pain, take over the counter pain relievers or pain medication if prescribed.  Elevate the wound and limit excessive activity to prevent bleeding and/or swelling in your wound.  If you see blood coming through the dressing, apply gauze and tape over the dressing and hold firm pressure to the wound with your hand for 5-10 minutes continuously, without peeking, to help the bleeding stop.  Contact [facility] wound care team at [phone] or go to the nearest Emergency department if:  You have a fever greater than 101 taken by mouth.  Your pain gets worse or does not go away, even after taking your regular pain medicine.  Your skin around your wound is red, hot, swollen, or draining pus.  You have bleeding that continues to come through the dressing after holding pressure for 10 minutes   The current daily value (%DV) for protein is 50 grams per day and is meant as a general  goal for most people. Further increasing your dietary protein intake is very important for wound healing. Typically one needs over 100g of protein per day to help with wound healing needs.  If you are a dialysis patient or have problems with your kidneys, talk to your Nephrologist about how much protein you can take in with your condition.  Examples of high protein items that can be added to your diet include: eggs, chicken, red meats, almonds, cottage cheese, Greek yogurt, beans, and peanut butter.  Fortified protein bars, shakes and drinks can add 15-30 additional grams of protein per serving.   Also add:   1 daily general multivitamin   Sidney : 1 packet twice daily   Vitamin C : 500mg twice daily   Zinc 220 mg daily  Vit D : once daily    Call our [facility] wound clinic for questions/concerns a [phone] .

## 2022-11-22 NOTE — PROGRESS NOTES
Subjective:       Patient ID: Jong Clayton is a 52 y.o. male.    Chief Complaint: Diabetic Foot Ulcer (Right great toe)    53 y/o  WM with morbid obesity, diabetes, neuropathy hypertension, dyslipidemia, chronic back pain, bipolar and depression who has battled various DFU's with osteomyelitis (left 2nd toe 2019 and again 2020). I have treated him in past for this left 2nd toe and noted then he was noncompliant with my recommendations. I last saw him for that left 2nd toe in March 2020 where he had a bulbous left 2nd hammer toe with +MRI for osteo. He never returned as scheduled but ultimately followed through with my prior recs of distal tip amputation.    He returned to clinic in mid May 2022 complaining of a chronic untreated right plantar great toe ulcer which began in Dec 2021 that he tried to self treat. His wife finally got him to agree to come to clinic but unfortunately he continues to demonstrate noncompliance and more often than not misses visits here, rescheduled and then misses more.  It has been very difficult to treat because of that.  Back in the summer I ordered an MRI of this right big toe which took him months to get done (but finally had it in October and it did not demonstrate osteomyelitis).  He has been on various antibiotics though at times , July and again August when this ulcer looked worse.  He did not return to clinic until October 7th . He was last here one month ago ....again made and cancelled multiple appointments. Finally comes in today on 11/22/22 with false skin bruised covering over ulcer as well as what appears to be cellulitis of great toe      Review of Systems   Constitutional:  Positive for fatigue. Negative for chills and fever.   HENT: Negative.     Respiratory: Negative.     Cardiovascular: Negative.    Gastrointestinal: Negative.    Musculoskeletal:  Positive for back pain.   Skin:  Positive for wound (see hpi).   Neurological:  Positive for numbness (to both feet).  Negative for tremors, seizures, syncope and speech difficulty.       Objective:       Vitals:    11/22/22 1055   BP: 129/70   Pulse: 80   Resp: 20   Temp: 98.3 °F (36.8 °C)       Cbg on his darrian machine: 228         Physical Exam  Constitutional:       Appearance: He is morbidly obese.   HENT:      Head: Normocephalic and atraumatic.   Eyes:      Conjunctiva/sclera: Conjunctivae normal.   Cardiovascular:      Pulses: Normal pulses.           Dorsalis pedis pulses are 2+ on the right side and 2+ on the left side.      Comments: +hair on legs  Musculoskeletal:      Right lower leg: No edema.      Left lower leg: No edema.        Feet:    Feet:      Comments: Hallux valgus deformity noted  Skin:     Capillary Refill: Capillary refill takes less than 2 seconds.   Neurological:      General: No focal deficit present.      Mental Status: He is alert and oriented to person, place, and time. Mental status is at baseline.            (Retired) Wound 11/22/22 1053 Diabetic Ulcer Right medial;plantar Toe, first #1 (Active)   11/22/22 1053    Pre-existing: Yes   Primary Wound Type: Diabetic ulc   Side: Right   Orientation: medial;plantar   Location: Toe, first   Wound Number: #1   Ankle-Brachial Index: paul done 11/22/22 = dp = 1.10, pt = 1.10   Pulses: + palpable/+ doppler, right dp/pt = biphasic, left dp = triphasic, pt = biphasic   Removal Indication and Assessment:    Wound Outcome:    (Retired) Wound Type:    (Retired) Wound Length (cm):    (Retired) Wound Width (cm):    (Retired) Depth (cm):    Wound Description (Comments):    Removal Indications:    Wound Image   11/22/22 1137   Dressing Appearance Intact;Moist drainage 11/22/22 1108   Drainage Amount Moderate 11/22/22 1108   Drainage Characteristics/Odor Serosanguineous;No odor 11/22/22 1108   Wound Edges Undefined 11/22/22 1108   Wound Length (cm) 1 cm 11/22/22 1108   Wound Width (cm) 2.2 cm 11/22/22 1108   Wound Depth (cm) 0.4 cm 11/22/22 1108   Wound Volume (cm^3)  0.88 cm^3 11/22/22 1108   Wound Surface Area (cm^2) 2.2 cm^2 11/22/22 1108   Care Cleansed with:;Wound cleanser;Applied: 11/22/22 1108   Dressing Applied;Calcium alginate;Silver;Absorptive Pad;Rolled gauze 11/22/22 1137   Periwound Care Absorptive dressing applied 11/22/22 1137               Assessment:       1. Cellulitis of great toe of right foot    2. Ulcer of right great toe due to diabetes mellitus    3. Type 2 diabetes mellitus with foot ulcer, without long-term current use of insulin    4. Medically noncompliant    5. Staph aureus infection    6. Group B streptococcal infection    7. Diabetic polyneuropathy associated with type 2 diabetes mellitus    8. Morbid obesity    9. Essential hypertension, benign    10. Bipolar II disorder         Right hallux DFU: first clinic visit 5/19/22, neg xray: recalcitrant to healing complicated by his noncompliance and clinic absence; deterioration noted on 11/22/22 with toe cellulitis  Right foot MRI 10/5/22: no osteomyelitis  Prior left 2nd toe osteomyelitis with partial amputation by Dr CAMERON driscoll 2020, New ulcer left 2nd toe volar side: noted 6/9/22: closed    diabetes, hba1c 9.1 April 2022, down to 7.3 July 2022  Morbid obesity with bmi 54  Hypertension   Hyperlipidemia  H/o depression/bipolar  Medical noncompliance    Results for orders placed or performed during the hospital encounter of 08/12/22 (from the past 2160 hour(s))   MRI Foot (Forefoot) Right W W/O Contrast    Impression    The middle phalanx of the 2nd toe and the distal phalanges of the 3rd, 4th, and 5th toes are obscured by poor fat saturation.  Osteomyelitis is considered unlikely but cannot be entirely excluded.    An ulcer is present to the base of the distal phalanx of the great toe.  No evidence for osteomyelitis is present to the distal phalanx of the great toe.  No MR evidence is present to suggest septic arthritis.    Interval metal artifact noted to the plantar aspect of the forefoot.  Has the  patient had recent surgical instrumentation or a metallic foreign body?      Electronically signed by: Mc Schuler  Date:    10/05/2022  Time:    14:04         Plan:           Plan of Care:    His right great toe deteriorated again noted on today's visit after he did not show up for clinic for a month.  His toe looks like it has cellulitis as well so I will call in 2 prescriptions, 1 for Augmentin and 1 for Bactrim.  He does not appear to have any type of paronychia but I did trim the toenail on this toe to make sure that nothing was digging into the skin edges.  It has been very difficult to work with Jong because of his noncompliance in his absences from clinic.  His prognosis is guarded and he has the potential to need a toe amputation in the future    Debrided the ulcer  Nutrition: Must have a high protein diet to support wound  healing; (if renal disease, see nephrologist for amount allowed):  this should be over 100g protein /day (if no kidney issues); Also rec MVI along with vit C, vit D, zinc and Sidney  Diabetes: recent a1c down to under 8 but has lots of high cbg readings , doesn't folllow good diabetic diet  Continue offloading with jojo for now  Return to clinic 1 week and encouraged compliance      The time spent including preparing to see the patient, obtaining patient history and assessment, evaluation of the plan of care, patient/caregiver counseling and education, orders, documentation, coordination of care, and other professional medical management activities for today's encounter was 20 minutes.  Time spent performing procedures during today's encounter was 10 minutes.

## 2022-11-23 NOTE — PROCEDURES
"Debridement    Date/Time: 11/22/2022 10:48 AM  Performed by: Soheila Vidal MD  Authorized by: Soheila Vidal MD   Associated wounds:        (Retired) Wound 11/22/22 1053 Diabetic Ulcer Right medial;plantar Toe, first #1  Time out: Immediately prior to procedure a "time out" was called to verify the correct patient, procedure, equipment, support staff and site/side marked as required.    Consent Done?:  Yes (Written)    Preparation: Patient was prepped and draped in usual sterile fashion    Local anesthesia used?: Yes    Local anesthetic:  Topical anesthetic    Type of Debridement:  Excisional       Length (cm):  1       Area (sq cm):  2.2       Width (cm):  2.2       Percent Debrided (%):  100       Depth (cm):  0.4       Total Area Debrided (sq cm):  2.2    Depth of debridement:  Subcutaneous tissue    Tissue debrided:  Epidermis, Dermis and Subcutaneous    Devitalized tissue debrided:  Biofilm, Slough and Other    Instruments:  Curette and Nippers    Bleeding:  Minimal  Hemostasis Achieved: Yes    Method Used:  Pressure  Patient tolerance:  Patient tolerated the procedure well with no immediate complications  "

## 2022-11-29 ENCOUNTER — HOSPITAL ENCOUNTER (OUTPATIENT)
Dept: WOUND CARE | Facility: HOSPITAL | Age: 52
Discharge: HOME | End: 2022-11-29
Attending: EMERGENCY MEDICINE
Payer: MEDICARE

## 2022-11-29 VITALS
RESPIRATION RATE: 22 BRPM | TEMPERATURE: 98 F | HEIGHT: 70 IN | HEART RATE: 103 BPM | WEIGHT: 315 LBS | BODY MASS INDEX: 45.1 KG/M2 | DIASTOLIC BLOOD PRESSURE: 84 MMHG | SYSTOLIC BLOOD PRESSURE: 151 MMHG

## 2022-11-29 DIAGNOSIS — F31.81 BIPOLAR II DISORDER: ICD-10-CM

## 2022-11-29 DIAGNOSIS — L97.509 TYPE 2 DIABETES MELLITUS WITH FOOT ULCER, WITHOUT LONG-TERM CURRENT USE OF INSULIN: ICD-10-CM

## 2022-11-29 DIAGNOSIS — E11.621 ULCER OF RIGHT GREAT TOE DUE TO DIABETES MELLITUS: ICD-10-CM

## 2022-11-29 DIAGNOSIS — E11.621 TYPE 2 DIABETES MELLITUS WITH FOOT ULCER, WITHOUT LONG-TERM CURRENT USE OF INSULIN: ICD-10-CM

## 2022-11-29 DIAGNOSIS — A49.01 STAPH AUREUS INFECTION: ICD-10-CM

## 2022-11-29 DIAGNOSIS — L97.519 ULCER OF RIGHT GREAT TOE DUE TO DIABETES MELLITUS: ICD-10-CM

## 2022-11-29 DIAGNOSIS — I10 ESSENTIAL HYPERTENSION, BENIGN: ICD-10-CM

## 2022-11-29 DIAGNOSIS — E66.01 MORBID OBESITY: ICD-10-CM

## 2022-11-29 DIAGNOSIS — Z91.199 MEDICALLY NONCOMPLIANT: ICD-10-CM

## 2022-11-29 DIAGNOSIS — L03.031 CELLULITIS OF GREAT TOE OF RIGHT FOOT: ICD-10-CM

## 2022-11-29 DIAGNOSIS — A49.1 GROUP B STREPTOCOCCAL INFECTION: ICD-10-CM

## 2022-11-29 DIAGNOSIS — E11.42 DIABETIC POLYNEUROPATHY ASSOCIATED WITH TYPE 2 DIABETES MELLITUS: ICD-10-CM

## 2022-11-29 PROCEDURE — 11042 DEBRIDEMENT: ICD-10-PCS | Mod: NSCH,,, | Performed by: EMERGENCY MEDICINE

## 2022-11-29 PROCEDURE — 27000999 HC MEDICAL RECORD PHOTO DOCUMENTATION

## 2022-11-29 PROCEDURE — 11042 DBRDMT SUBQ TIS 1ST 20SQCM/<: CPT

## 2022-11-29 PROCEDURE — 11042 DBRDMT SUBQ TIS 1ST 20SQCM/<: CPT | Mod: NSCH,,, | Performed by: EMERGENCY MEDICINE

## 2022-11-29 NOTE — PATIENT INSTRUCTIONS
Pt seen today by: Dr.Duplechin Lindsay. Antibiotics as directed    Self care DRESSING INSTRUCTIONS:      Wound location: Right Great Toe     Dressings to be changed every other day and as needed if soiled or not intact.  Cleanse wound with wound cleanser or saline  Apply gentamicin ointment/cream to the wound bed  Cover with alginate silver  Cover with abd pad and secure with a shaka/cover roll tape  Wear Darco shoe  Change dressing daily    Return to clinic on Tuesday, December 6th, 2022 at 11:00 am    Wound may have been debrided in clinic: if so, WHAT YOU NEED TO KNOW:    Debridement is the removal of infected, damaged, or dead tissue so a wound can heal properly. Your wound may need more than one debridement. Debridement can cause bleeding, and a small amount of blood is expected.  AFTER A DEBRIDEMENT:  Keep your wound clean and dry. Do not remove the dressing unless instructed.  Follow the wound care orders provided to you or your home health care provider.  If you have pain, take over the counter pain relievers or pain medication if prescribed.  Elevate the wound and limit excessive activity to prevent bleeding and/or swelling in your wound.  If you see blood coming through the dressing, apply gauze and tape over the dressing and hold firm pressure to the wound with your hand for 5-10 minutes continuously, without peeking, to help the bleeding stop.  Contact [facility] wound care team at [phone] or go to the nearest Emergency department if:  You have a fever greater than 101 taken by mouth.  Your pain gets worse or does not go away, even after taking your regular pain medicine.  Your skin around your wound is red, hot, swollen, or draining pus.  You have bleeding that continues to come through the dressing after holding pressure for 10 minutes   The current daily value (%DV) for protein is 50 grams per day and is meant as a general goal for most people. Further increasing your dietary protein intake is very  important for wound healing. Typically one needs over 100g of protein per day to help with wound healing needs.  If you are a dialysis patient or have problems with your kidneys, talk to your Nephrologist about how much protein you can take in with your condition.  Examples of high protein items that can be added to your diet include: eggs, chicken, red meats, almonds, cottage cheese, Greek yogurt, beans, and peanut butter.  Fortified protein bars, shakes and drinks can add 15-30 additional grams of protein per serving.   Also add:   1 daily general multivitamin   Sidney : 1 packet twice daily   Vitamin C : 500mg twice daily   Zinc 220 mg daily  Vit D : once daily    Call our [facility] wound clinic for questions/concerns a [phone] .

## 2022-11-29 NOTE — PROGRESS NOTES
Subjective:       Patient ID: Jong Clayton is a 52 y.o. male.    Chief Complaint: No chief complaint on file.    51 y/o  WM with morbid obesity, diabetes, neuropathy hypertension, dyslipidemia, chronic back pain, bipolar and depression who has battled various DFU's with osteomyelitis (left 2nd toe 2019 and again 2020). I have treated him in past for this left 2nd toe and noted then he was noncompliant with my recommendations. I last saw him for that left 2nd toe in March 2020 where he had a bulbous left 2nd hammer toe with +MRI for osteo. He never returned as scheduled but ultimately followed through with my prior recs of distal tip amputation.    He returned to clinic in mid May 2022 complaining of a chronic untreated right plantar great toe ulcer which began in Dec 2021 that he tried to self treat. His wife finally got him to agree to come to clinic but unfortunately he continues to demonstrate noncompliance and more often than not misses visits here, rescheduled and then misses more.  It has been very difficult to treat because of that.  Back in the summer I ordered an MRI of this right big toe which took him months to get done (but finally had it in October and it did not demonstrate osteomyelitis).  He has been on various antibiotics though at times , July and again August when this ulcer looked worse.  He did not return to clinic until October 7th . He was last here one month ago ....again made and cancelled multiple appointments. Finally comes in today on 11/22/22 with false skin over ulcer that is bruised as well as what appears to be cellulitis of great toe. I prescribed 2 antibiotics: bactrim and augmentin.  Pt returns today on 11/29/22 for recheck. Wife here and says she also gave him a few days of  leftover levaquin . Toe redness resolved but we note a large blister like covering that extends from ulcer to medial side of toe. CBG over 400 again today      Review of Systems   Constitutional:  Positive  for fatigue. Negative for chills and fever.   HENT: Negative.     Respiratory: Negative.     Cardiovascular: Negative.    Gastrointestinal: Negative.    Musculoskeletal:  Positive for back pain.   Skin:  Positive for wound (see hpi).   Neurological:  Positive for numbness (to both feet). Negative for tremors, seizures, syncope and speech difficulty.       Objective:       Vitals:    11/29/22 1417   BP: (!) 151/84   Pulse: 103   Resp: (!) 22   Temp: 98.3 °F (36.8 °C)       Cbg : >400     Physical Exam  Constitutional:       Appearance: He is morbidly obese.   HENT:      Head: Normocephalic and atraumatic.   Eyes:      Conjunctiva/sclera: Conjunctivae normal.   Cardiovascular:      Pulses: Normal pulses.           Dorsalis pedis pulses are 2+ on the right side and 2+ on the left side.      Comments: +hair on legs  Musculoskeletal:      Right lower leg: No edema.      Left lower leg: No edema.        Feet:    Feet:      Comments: Hallux valgus deformity noted  Skin:     Capillary Refill: Capillary refill takes less than 2 seconds.   Neurological:      General: No focal deficit present.      Mental Status: He is alert and oriented to person, place, and time. Mental status is at baseline.            (Retired) Wound 11/22/22 1053 Diabetic Ulcer Right medial;plantar Toe, first #1 (Active)   11/22/22 1053    Pre-existing: Yes   Primary Wound Type: Diabetic ulc   Side: Right   Orientation: medial;plantar   Location: Toe, first   Wound Number: #1   Ankle-Brachial Index: paul done 11/22/22 = dp = 1.10, pt = 1.10   Pulses: + palpable/+ doppler, right dp/pt = biphasic, left dp = triphasic, pt = biphasic   Removal Indication and Assessment:    Wound Outcome:    (Retired) Wound Type:    (Retired) Wound Length (cm):    (Retired) Wound Width (cm):    (Retired) Depth (cm):    Wound Description (Comments):    Removal Indications:    Wound Image   11/29/22 1445   Dressing Appearance Intact;Moist drainage 11/29/22 1424   Drainage Amount  Moderate 11/29/22 1424   Drainage Characteristics/Odor Serosanguineous 11/29/22 1424   Appearance Yellow 11/29/22 1424   Tissue loss description Full thickness 11/29/22 1424   Black (%), Wound Tissue Color 0 % 11/29/22 1424   Red (%), Wound Tissue Color 0 % 11/29/22 1424   Yellow (%), Wound Tissue Color 100 % 11/29/22 1424   Periwound Area Edematous;Macerated;Redness;Blistered 11/29/22 1424   Wound Edges Defined 11/29/22 1424   Wound Length (cm) 1.5 cm 11/29/22 1424   Wound Width (cm) 1.6 cm 11/29/22 1424   Wound Depth (cm) 0.3 cm 11/29/22 1424   Wound Volume (cm^3) 0.72 cm^3 11/29/22 1424   Wound Surface Area (cm^2) 2.4 cm^2 11/29/22 1424   Undermining (depth (cm)/location) undermining 360, deepest 1.8cm 11/29/22 1424   Care Cleansed with:;Wound cleanser;Applied: 11/29/22 1424   Dressing Applied 11/29/22 1424   Off Loading Off loading shoe 11/29/22 1424               Assessment:       1. Cellulitis of great toe of right foot    2. Ulcer of right great toe due to diabetes mellitus    3. Type 2 diabetes mellitus with foot ulcer, without long-term current use of insulin    4. Medically noncompliant    5. Staph aureus infection    6. Group B streptococcal infection    7. Diabetic polyneuropathy associated with type 2 diabetes mellitus    8. Morbid obesity    9. Essential hypertension, benign    10. Bipolar II disorder         Right hallux DFU: first clinic visit 5/19/22, neg xray: recalcitrant to healing complicated by his noncompliance and clinic absence; deterioration noted on 11/22/22 with toe cellulitis: rx bactrim and augmentin  Right foot MRI 10/5/22: no osteomyelitis  Prior left 2nd toe osteomyelitis with partial amputation by Dr CAMERON driscoll 2020, New ulcer left 2nd toe volar side: noted 6/9/22: closed    diabetes, hba1c 9.1 April 2022, down to 7.3 July 2022  Morbid obesity with bmi 54  Hypertension   Hyperlipidemia  H/o depression/bipolar  Medical noncompliance    Results for orders placed or performed during the  hospital encounter of 08/12/22 (from the past 2160 hour(s))   MRI Foot (Forefoot) Right W W/O Contrast    Impression    The middle phalanx of the 2nd toe and the distal phalanges of the 3rd, 4th, and 5th toes are obscured by poor fat saturation.  Osteomyelitis is considered unlikely but cannot be entirely excluded.    An ulcer is present to the base of the distal phalanx of the great toe.  No evidence for osteomyelitis is present to the distal phalanx of the great toe.  No MR evidence is present to suggest septic arthritis.    Interval metal artifact noted to the plantar aspect of the forefoot.  Has the patient had recent surgical instrumentation or a metallic foreign body?      Electronically signed by: Mc Schuler  Date:    10/05/2022  Time:    14:04         Plan:           Plan of Care:    He has been touch to treat. Does not come in regularly and skips lots of appointments and has had another decline in the right hallux DFU.  At least recent MRI was neg for osteo but I am concerned that he will eventually lose this toe.  He remains with hyperglycemia as well. Says he is seeing Walker soon and has a record of cbgs    Debrided the ulcer and blister  Nutrition: Must have a high protein diet to support wound  healing; (if renal disease, see nephrologist for amount allowed):  this should be over 100g protein /day (if no kidney issues); Also rec MVI along with vit C, vit D, zinc and Sidney  Diabetes: recent a1c down to under 8 but has lots of high cbg readings , doesn't folllow good diabetic diet  Continue offloading with jojo for now  Return to clinic 1 week and encouraged compliance

## 2022-11-29 NOTE — PROCEDURES
"Debridement    Date/Time: 11/29/2022 1:57 PM  Performed by: Soheila Vidal MD  Authorized by: Soheila Vidal MD   Associated wounds:        (Retired) Wound 11/22/22 1053 Diabetic Ulcer Right medial;plantar Toe, first #1  Time out: Immediately prior to procedure a "time out" was called to verify the correct patient, procedure, equipment, support staff and site/side marked as required.    Consent Done?:  Yes (Written)    Preparation: Patient was prepped and draped in usual sterile fashion    Local anesthesia used?: Yes    Local anesthetic:  Topical anesthetic    Wound Details:    Location:  Right foot    Location:  Right 1st Toe    Type of Debridement:  Excisional       Length (cm):  1.5       Area (sq cm):  2.4       Width (cm):  1.6       Percent Debrided (%):  100       Depth (cm):  0.3       Total Area Debrided (sq cm):  2.4    Depth of debridement:  Subcutaneous tissue    Tissue debrided:  Dermis, Epidermis and Subcutaneous    Devitalized tissue debrided:  Other, Fibrin, Biofilm and Callus    Instruments:  Curette, Blade and Scissors    Bleeding:  Minimal  Hemostasis Achieved: Yes    Method Used:  Pressure  Patient tolerance:  Patient tolerated the procedure well with no immediate complications     Removed blister like covering that extended on medial side of toe;   "

## 2022-11-30 ENCOUNTER — TELEPHONE (OUTPATIENT)
Dept: WOUND CARE | Facility: HOSPITAL | Age: 52
End: 2022-11-30
Payer: MEDICARE

## 2022-11-30 LAB — POCT GLUCOSE: 444 MG/DL (ref 70–110)

## 2022-11-30 NOTE — TELEPHONE ENCOUNTER
Called [facility] medical DME company and they stated that pts supply order was delivered yesterday

## 2022-12-05 ENCOUNTER — HOSPITAL ENCOUNTER (OUTPATIENT)
Dept: WOUND CARE | Facility: HOSPITAL | Age: 52
Discharge: HOME | End: 2022-12-05
Attending: EMERGENCY MEDICINE
Payer: MEDICARE

## 2022-12-05 VITALS
HEART RATE: 80 BPM | TEMPERATURE: 98 F | RESPIRATION RATE: 18 BRPM | SYSTOLIC BLOOD PRESSURE: 133 MMHG | DIASTOLIC BLOOD PRESSURE: 80 MMHG

## 2022-12-05 DIAGNOSIS — E11.42 DIABETIC POLYNEUROPATHY ASSOCIATED WITH TYPE 2 DIABETES MELLITUS: ICD-10-CM

## 2022-12-05 DIAGNOSIS — E66.01 MORBID OBESITY: ICD-10-CM

## 2022-12-05 DIAGNOSIS — Z91.199 MEDICALLY NONCOMPLIANT: ICD-10-CM

## 2022-12-05 DIAGNOSIS — L03.031 CELLULITIS OF GREAT TOE OF RIGHT FOOT: ICD-10-CM

## 2022-12-05 DIAGNOSIS — A49.1 GROUP B STREPTOCOCCAL INFECTION: ICD-10-CM

## 2022-12-05 DIAGNOSIS — A49.01 STAPH AUREUS INFECTION: ICD-10-CM

## 2022-12-05 DIAGNOSIS — L97.519 ULCER OF RIGHT GREAT TOE DUE TO DIABETES MELLITUS: ICD-10-CM

## 2022-12-05 DIAGNOSIS — I10 ESSENTIAL HYPERTENSION, BENIGN: ICD-10-CM

## 2022-12-05 DIAGNOSIS — E11.621 ULCER OF RIGHT GREAT TOE DUE TO DIABETES MELLITUS: ICD-10-CM

## 2022-12-05 DIAGNOSIS — E11.621 TYPE 2 DIABETES MELLITUS WITH FOOT ULCER, WITHOUT LONG-TERM CURRENT USE OF INSULIN: ICD-10-CM

## 2022-12-05 DIAGNOSIS — L97.509 TYPE 2 DIABETES MELLITUS WITH FOOT ULCER, WITHOUT LONG-TERM CURRENT USE OF INSULIN: ICD-10-CM

## 2022-12-05 PROCEDURE — 11042 DEBRIDEMENT: ICD-10-PCS | Mod: NSCH,,, | Performed by: EMERGENCY MEDICINE

## 2022-12-05 PROCEDURE — 11042 DBRDMT SUBQ TIS 1ST 20SQCM/<: CPT

## 2022-12-05 PROCEDURE — 99499 NO LOS: ICD-10-PCS | Mod: NSCH,,, | Performed by: EMERGENCY MEDICINE

## 2022-12-05 PROCEDURE — 27000999 HC MEDICAL RECORD PHOTO DOCUMENTATION

## 2022-12-05 PROCEDURE — 99499 UNLISTED E&M SERVICE: CPT | Mod: NSCH,,, | Performed by: EMERGENCY MEDICINE

## 2022-12-05 PROCEDURE — 11042 DBRDMT SUBQ TIS 1ST 20SQCM/<: CPT | Mod: NSCH,,, | Performed by: EMERGENCY MEDICINE

## 2022-12-05 NOTE — PROGRESS NOTES
Subjective:       Patient ID: Jong Clayton is a 52 y.o. male.    Chief Complaint: No chief complaint on file.    53 y/o  WM with morbid obesity, diabetes, neuropathy hypertension, dyslipidemia, chronic back pain, bipolar and depression who has battled various DFU's with osteomyelitis (left 2nd toe 2019 and again 2020). I have treated him in past for this left 2nd toe and noted then he was noncompliant with my recommendations. I last saw him for that left 2nd toe in March 2020 where he had a bulbous left 2nd hammer toe with +MRI for osteo. He never returned as scheduled but ultimately followed through with my prior recs of distal tip amputation.    He returned to clinic in mid May 2022 complaining of a chronic untreated right plantar great toe ulcer which began in Dec 2021 that he tried to self treat. His wife finally got him to agree to come to clinic but unfortunately he continues to demonstrate noncompliance and more often than not misses visits here, rescheduled and then misses more.  It has been very difficult to treat because of that.  Back in the summer I ordered an MRI of this right big toe which took him months to get done (but finally had it in October and it did not demonstrate osteomyelitis).  He has been on various antibiotics though at times , July and again August when this ulcer looked worse.  He did not return to clinic until October 7th . He was last here one month ago ....again made and cancelled multiple appointments. Finally comes in today on 11/22/22 with false skin over ulcer that is bruised as well as what appears to be cellulitis of great toe. I prescribed 2 antibiotics: bactrim and augmentin. Cellulitis and periwound blistering resolved but left with a larger DFU .      Review of Systems   Constitutional:  Positive for fatigue. Negative for chills and fever.   HENT: Negative.     Respiratory: Negative.     Cardiovascular: Negative.    Gastrointestinal: Negative.    Musculoskeletal:   Positive for back pain.   Skin:  Positive for wound (see hpi).   Neurological:  Positive for numbness (to both feet). Negative for tremors, seizures, syncope and speech difficulty.       Objective:       Vitals:    12/05/22 1356   BP: 133/80   Pulse: 80   Resp: 18   Temp: 98.3 °F (36.8 °C)       Cbg : >400     Physical Exam  Constitutional:       Appearance: He is morbidly obese.   HENT:      Head: Normocephalic and atraumatic.   Eyes:      Conjunctiva/sclera: Conjunctivae normal.   Cardiovascular:      Pulses: Normal pulses.           Dorsalis pedis pulses are 2+ on the right side and 2+ on the left side.      Comments: +hair on legs  Musculoskeletal:      Right lower leg: No edema.      Left lower leg: No edema.        Feet:    Feet:      Comments: Hallux valgus deformity noted  Skin:     Capillary Refill: Capillary refill takes less than 2 seconds.   Neurological:      General: No focal deficit present.      Mental Status: He is alert and oriented to person, place, and time. Mental status is at baseline.            (Retired) Wound 11/22/22 1053 Diabetic Ulcer Right medial;plantar Toe, first #1 (Active)   11/22/22 1053    Pre-existing: Yes   Primary Wound Type: Diabetic ulc   Side: Right   Orientation: medial;plantar   Location: Toe, first   Wound Number: #1   Ankle-Brachial Index: paul done 11/22/22 = dp = 1.10, pt = 1.10   Pulses: + palpable/+ doppler, right dp/pt = biphasic, left dp = triphasic, pt = biphasic   Removal Indication and Assessment:    Wound Outcome:    (Retired) Wound Type:    (Retired) Wound Length (cm):    (Retired) Wound Width (cm):    (Retired) Depth (cm):    Wound Description (Comments):    Removal Indications:    Wound Image   12/05/22 1343   Dressing Appearance Intact 12/05/22 1343   Drainage Amount Moderate 12/05/22 1343   Drainage Characteristics/Odor Clear;Serous 12/05/22 1343   Appearance Granulating;Slough 12/05/22 1343   Red (%), Wound Tissue Color 75 % 12/05/22 1343   Yellow (%),  Wound Tissue Color 25 % 12/05/22 1343   Periwound Area Intact;Dry 12/05/22 1343   Wound Edges Defined;Callused 12/05/22 1343   Wound Length (cm) 1.8 cm 12/05/22 1343   Wound Width (cm) 1.5 cm 12/05/22 1343   Wound Depth (cm) 1 cm 12/05/22 1343   Wound Volume (cm^3) 2.7 cm^3 12/05/22 1343   Wound Surface Area (cm^2) 2.7 cm^2 12/05/22 1343   Care Soap and water;Wound cleanser 12/05/22 1343   Dressing Applied 12/05/22 1343               Assessment:       1. Ulcer of right great toe due to diabetes mellitus    2. Type 2 diabetes mellitus with foot ulcer, without long-term current use of insulin    3. Cellulitis of great toe of right foot    4. Medically noncompliant    5. Staph aureus infection    6. Group B streptococcal infection    7. Diabetic polyneuropathy associated with type 2 diabetes mellitus    8. Morbid obesity    9. Essential hypertension, benign         Right hallux DFU: first clinic visit 5/19/22, neg xray: recalcitrant to healing complicated by his noncompliance and clinic absence; deterioration noted on 11/22/22 with toe cellulitis: rx bactrim and augmentin  Right foot MRI 10/5/22: no osteomyelitis  Prior left 2nd toe osteomyelitis with partial amputation by Dr CAMERON driscoll 2020, New ulcer left 2nd toe volar side: noted 6/9/22: closed    diabetes, hba1c 9.1 April 2022, down to 7.3 July 2022  Morbid obesity with bmi 54  Hypertension   Hyperlipidemia  H/o depression/bipolar  Medical noncompliance    Results for orders placed or performed during the hospital encounter of 08/12/22 (from the past 2160 hour(s))   MRI Foot (Forefoot) Right W W/O Contrast    Impression    The middle phalanx of the 2nd toe and the distal phalanges of the 3rd, 4th, and 5th toes are obscured by poor fat saturation.  Osteomyelitis is considered unlikely but cannot be entirely excluded.    An ulcer is present to the base of the distal phalanx of the great toe.  No evidence for osteomyelitis is present to the distal phalanx of the great  toe.  No MR evidence is present to suggest septic arthritis.    Interval metal artifact noted to the plantar aspect of the forefoot.  Has the patient had recent surgical instrumentation or a metallic foreign body?      Electronically signed by: Mc Schuler  Date:    10/05/2022  Time:    14:04         Plan:           Plan of Care:    He has been touch to treat. Does not come in regularly and skips lots of appointments and has had another decline in the right hallux DFU.  At least recent MRI was neg for osteo but I am concerned that he will eventually lose this toe.  He remains with hyperglycemia as well. Says he is seeing Aaron soon and has a record of cbgs    Debrided the ulcer on great toe  Nutrition: Must have a high protein diet to support wound  healing; (if renal disease, see nephrologist for amount allowed):  this should be over 100g protein /day (if no kidney issues); Also rec MVI along with vit C, vit D, zinc and Sidney  Diabetes: recent a1c down to under 8 but has lots of high cbg readings , doesn't folllow good diabetic diet  Continue offloading with jojo for now  Return to clinic 1 week and encouraged compliance

## 2022-12-05 NOTE — PROCEDURES
"Debridement    Date/Time: 12/5/2022 12:55 PM  Performed by: Soheila Vidal MD  Authorized by: Soheila Vidal MD   Associated wounds:        (Retired) Wound 11/22/22 1053 Diabetic Ulcer Right medial;plantar Toe, first #1  Time out: Immediately prior to procedure a "time out" was called to verify the correct patient, procedure, equipment, support staff and site/side marked as required.    Consent Done?:  Yes (Written)    Preparation: Patient was prepped and draped in usual sterile fashion    Local anesthesia used?: Yes    Local anesthetic:  Topical anesthetic    Wound Details:    Location:  Right foot    Location:  Right 1st Toe    Type of Debridement:  Excisional       Length (cm):  1.8       Area (sq cm):  2.7       Width (cm):  1.5       Percent Debrided (%):  100       Depth (cm):  1       Total Area Debrided (sq cm):  2.7    Depth of debridement:  Subcutaneous tissue    Tissue debrided:  Dermis, Subcutaneous and Epidermis    Devitalized tissue debrided:  Biofilm, Slough and Fibrin    Instruments:  Curette    Bleeding:  Minimal  Hemostasis Achieved: Yes    Method Used:  Pressure  Patient tolerance:  Patient tolerated the procedure well with no immediate complications  "

## 2022-12-05 NOTE — PATIENT INSTRUCTIONS
Pt seen today by: Dr.Duplechin Lindsay. Antibiotics as directed    Self care DRESSING INSTRUCTIONS:      Wound location: Right Great Toe     Dressings to be changed every other day and as needed if soiled or not intact.  Cleanse wound with wound cleanser or saline  Apply gentamicin ointment/cream to the wound bed  Cover with alginate silver  Cover with abd pad and secure with a shaka/cover roll tape  Wear Darco shoe  Change dressing daily    Return to clinic on Tuesday, December 12th, 2022 at 11:00 am    Wound may have been debrided in clinic: if so, WHAT YOU NEED TO KNOW:    Debridement is the removal of infected, damaged, or dead tissue so a wound can heal properly. Your wound may need more than one debridement. Debridement can cause bleeding, and a small amount of blood is expected.  AFTER A DEBRIDEMENT:  Keep your wound clean and dry. Do not remove the dressing unless instructed.  Follow the wound care orders provided to you or your home health care provider.  If you have pain, take over the counter pain relievers or pain medication if prescribed.  Elevate the wound and limit excessive activity to prevent bleeding and/or swelling in your wound.  If you see blood coming through the dressing, apply gauze and tape over the dressing and hold firm pressure to the wound with your hand for 5-10 minutes continuously, without peeking, to help the bleeding stop.  Contact [facility] wound care team at [phone] or go to the nearest Emergency department if:  You have a fever greater than 101 taken by mouth.  Your pain gets worse or does not go away, even after taking your regular pain medicine.  Your skin around your wound is red, hot, swollen, or draining pus.  You have bleeding that continues to come through the dressing after holding pressure for 10 minutes   The current daily value (%DV) for protein is 50 grams per day and is meant as a general goal for most people. Further increasing your dietary protein intake is very  important for wound healing. Typically one needs over 100g of protein per day to help with wound healing needs.  If you are a dialysis patient or have problems with your kidneys, talk to your Nephrologist about how much protein you can take in with your condition.  Examples of high protein items that can be added to your diet include: eggs, chicken, red meats, almonds, cottage cheese, Greek yogurt, beans, and peanut butter.  Fortified protein bars, shakes and drinks can add 15-30 additional grams of protein per serving.   Also add:   1 daily general multivitamin   Sidney : 1 packet twice daily   Vitamin C : 500mg twice daily   Zinc 220 mg daily  Vit D : once daily    Call our [facility] wound clinic for questions/concerns a [phone] .

## 2022-12-12 ENCOUNTER — HOSPITAL ENCOUNTER (OUTPATIENT)
Dept: WOUND CARE | Facility: HOSPITAL | Age: 52
Discharge: HOME | End: 2022-12-12
Attending: EMERGENCY MEDICINE
Payer: MEDICARE

## 2022-12-12 VITALS
TEMPERATURE: 98 F | RESPIRATION RATE: 20 BRPM | BODY MASS INDEX: 45.1 KG/M2 | WEIGHT: 315 LBS | HEART RATE: 95 BPM | SYSTOLIC BLOOD PRESSURE: 186 MMHG | HEIGHT: 70 IN | DIASTOLIC BLOOD PRESSURE: 89 MMHG

## 2022-12-12 DIAGNOSIS — L03.031 CELLULITIS OF GREAT TOE OF RIGHT FOOT: ICD-10-CM

## 2022-12-12 DIAGNOSIS — E11.621 TYPE 2 DIABETES MELLITUS WITH FOOT ULCER, WITHOUT LONG-TERM CURRENT USE OF INSULIN: ICD-10-CM

## 2022-12-12 DIAGNOSIS — Z91.199 MEDICALLY NONCOMPLIANT: ICD-10-CM

## 2022-12-12 DIAGNOSIS — A49.01 STAPH AUREUS INFECTION: ICD-10-CM

## 2022-12-12 DIAGNOSIS — L97.519 ULCER OF RIGHT GREAT TOE DUE TO DIABETES MELLITUS: ICD-10-CM

## 2022-12-12 DIAGNOSIS — L97.509 TYPE 2 DIABETES MELLITUS WITH FOOT ULCER, WITHOUT LONG-TERM CURRENT USE OF INSULIN: ICD-10-CM

## 2022-12-12 DIAGNOSIS — E11.65 UNCONTROLLED TYPE 2 DIABETES MELLITUS WITH HYPERGLYCEMIA: ICD-10-CM

## 2022-12-12 DIAGNOSIS — F31.81 BIPOLAR II DISORDER: ICD-10-CM

## 2022-12-12 DIAGNOSIS — E11.42 DIABETIC POLYNEUROPATHY ASSOCIATED WITH TYPE 2 DIABETES MELLITUS: ICD-10-CM

## 2022-12-12 DIAGNOSIS — A49.1 GROUP B STREPTOCOCCAL INFECTION: ICD-10-CM

## 2022-12-12 DIAGNOSIS — E66.01 MORBID OBESITY: ICD-10-CM

## 2022-12-12 DIAGNOSIS — I10 ESSENTIAL HYPERTENSION, BENIGN: ICD-10-CM

## 2022-12-12 DIAGNOSIS — E11.621 ULCER OF RIGHT GREAT TOE DUE TO DIABETES MELLITUS: ICD-10-CM

## 2022-12-12 PROCEDURE — 11042 DBRDMT SUBQ TIS 1ST 20SQCM/<: CPT

## 2022-12-12 PROCEDURE — 99499 UNLISTED E&M SERVICE: CPT | Mod: NSCH,,, | Performed by: EMERGENCY MEDICINE

## 2022-12-12 PROCEDURE — 27000999 HC MEDICAL RECORD PHOTO DOCUMENTATION

## 2022-12-12 PROCEDURE — 11042 DBRDMT SUBQ TIS 1ST 20SQCM/<: CPT | Mod: NSCH,,, | Performed by: EMERGENCY MEDICINE

## 2022-12-12 PROCEDURE — 99499 NO LOS: ICD-10-PCS | Mod: NSCH,,, | Performed by: EMERGENCY MEDICINE

## 2022-12-12 PROCEDURE — 11042 DEBRIDEMENT: ICD-10-PCS | Mod: NSCH,,, | Performed by: EMERGENCY MEDICINE

## 2022-12-12 RX ORDER — CIPROFLOXACIN 500 MG/1
500 TABLET ORAL EVERY 12 HOURS
Qty: 28 TABLET | Refills: 0 | Status: SHIPPED | OUTPATIENT
Start: 2022-12-12 | End: 2022-12-26

## 2022-12-12 NOTE — PATIENT INSTRUCTIONS
Pt seen today by:     Self care DRESSING INSTRUCTIONS:      Wound location: Right Great Toe     Dressings to be changed every other day and as needed if soiled or not intact.  Cleanse wound with wound cleanser or saline  Apply gentamicin ointment/cream to the wound bed  Cover with alginate silver  Cover with abd pad and secure with a shaka/cover roll tape  Wear Darco shoe  Change dressing daily    Return to clinic on Monday, December 19th, 2022 at 11:00 am    Wound may have been debrided in clinic: if so, WHAT YOU NEED TO KNOW:    Debridement is the removal of infected, damaged, or dead tissue so a wound can heal properly. Your wound may need more than one debridement. Debridement can cause bleeding, and a small amount of blood is expected.  AFTER A DEBRIDEMENT:  Keep your wound clean and dry. Do not remove the dressing unless instructed.  Follow the wound care orders provided to you or your home health care provider.  If you have pain, take over the counter pain relievers or pain medication if prescribed.  Elevate the wound and limit excessive activity to prevent bleeding and/or swelling in your wound.  If you see blood coming through the dressing, apply gauze and tape over the dressing and hold firm pressure to the wound with your hand for 5-10 minutes continuously, without peeking, to help the bleeding stop.  Contact [facility] wound care team at [phone] or go to the nearest Emergency department if:  You have a fever greater than 101 taken by mouth.  Your pain gets worse or does not go away, even after taking your regular pain medicine.  Your skin around your wound is red, hot, swollen, or draining pus.  You have bleeding that continues to come through the dressing after holding pressure for 10 minutes   The current daily value (%DV) for protein is 50 grams per day and is meant as a general goal for most people. Further increasing your dietary protein intake is very important for wound healing.  Typically one needs over 100g of protein per day to help with wound healing needs.  If you are a dialysis patient or have problems with your kidneys, talk to your Nephrologist about how much protein you can take in with your condition.  Examples of high protein items that can be added to your diet include: eggs, chicken, red meats, almonds, cottage cheese, Greek yogurt, beans, and peanut butter.  Fortified protein bars, shakes and drinks can add 15-30 additional grams of protein per serving.   Also add:   1 daily general multivitamin   Sidney : 1 packet twice daily   Vitamin C : 500mg twice daily   Zinc 220 mg daily  Vit D : once daily    Call our [facility] wound clinic for questions/concerns a [phone] .

## 2022-12-12 NOTE — PROGRESS NOTES
Subjective:       Patient ID: Jong Clayton is a 52 y.o. male.    Chief Complaint: No chief complaint on file.      51 y/o  WM with morbid obesity, diabetes, neuropathy hypertension, dyslipidemia, chronic back pain, bipolar and depression who has battled various DFU's with osteomyelitis (left 2nd toe 2019 and again 2020). I have treated him in past for this left 2nd toe and noted then he was noncompliant with my recommendations. I last saw him for that left 2nd toe in March 2020 where he had a bulbous left 2nd hammer toe with +MRI for osteo. He never returned as scheduled but ultimately followed through with my prior recs of distal tip amputation.    He returned to clinic in mid May 2022 complaining of a chronic untreated right plantar great toe ulcer which began in Dec 2021 that he tried to self treat. His wife finally got him to agree to come to clinic but unfortunately he continues to demonstrate noncompliance and more often than not misses visits here, rescheduled and then misses more.  It has been very difficult to treat because of that.  Back in the summer I ordered an MRI of this right big toe which took him months to get done (but finally had it in October and it did not demonstrate osteomyelitis).  He has been on various antibiotics though at times , July and again August when this ulcer looked worse.  He did not return to clinic until October 7th . He was last here one month ago ....again made and cancelled multiple appointments. Finally comes in today on 11/22/22 with false skin over ulcer that is bruised as well as what appears to be cellulitis of great toe. I prescribed 2 antibiotics: bactrim and augmentin. Cellulitis and periwound blistering resolved but left with a larger DFU that is not smaller yet. He continues to rodrigues significant hyperglycemia:seeing roberts this week about it .      Review of Systems   Constitutional:  Positive for fatigue. Negative for chills and fever.   HENT: Negative.      Respiratory: Negative.     Cardiovascular: Negative.    Gastrointestinal: Negative.    Musculoskeletal:  Positive for back pain.   Skin:  Positive for wound (see hpi).   Neurological:  Positive for numbness (to both feet). Negative for tremors, seizures, syncope and speech difficulty.       Objective:       Vitals:    12/12/22 1057   BP: (!) 186/89   Pulse: 95   Resp: 20   Temp: 98.2 °F (36.8 °C)       Cbg : 369 on his dexcom     Physical Exam  Constitutional:       Appearance: He is morbidly obese.   HENT:      Head: Normocephalic and atraumatic.   Eyes:      Conjunctiva/sclera: Conjunctivae normal.   Cardiovascular:      Pulses: Normal pulses.           Dorsalis pedis pulses are 2+ on the right side and 2+ on the left side.      Comments: +hair on legs  Musculoskeletal:      Right lower leg: No edema.      Left lower leg: No edema.        Feet:    Feet:      Comments: Hallux valgus deformity noted  Skin:     Capillary Refill: Capillary refill takes less than 2 seconds.   Neurological:      General: No focal deficit present.      Mental Status: He is alert and oriented to person, place, and time. Mental status is at baseline.            (Retired) Wound 11/22/22 1053 Diabetic Ulcer Right medial;plantar Toe, first #1 (Active)   11/22/22 1053    Pre-existing: Yes   Primary Wound Type: Diabetic ulc   Side: Right   Orientation: medial;plantar   Location: Toe, first   Wound Number: #1   Ankle-Brachial Index: paul done 11/22/22 = dp = 1.10, pt = 1.10   Pulses: + palpable/+ doppler, right dp/pt = biphasic, left dp = triphasic, pt = biphasic   Removal Indication and Assessment:    Wound Outcome:    (Retired) Wound Type:    (Retired) Wound Length (cm):    (Retired) Wound Width (cm):    (Retired) Depth (cm):    Wound Description (Comments):    Removal Indications:    Wound Image   12/12/22 1112   Dressing Appearance No dressing 12/12/22 1112   Drainage Amount Moderate 12/12/22 1112   Drainage Characteristics/Odor  Serosanguineous 12/12/22 1112   Appearance Pink 12/12/22 1112   Tissue loss description Full thickness 12/12/22 1112   Periwound Area Intact;Dry 12/12/22 1112   Wound Edges Defined 12/12/22 1112   Wound Length (cm) 1.5 cm 12/12/22 1112   Wound Width (cm) 1.5 cm 12/12/22 1112   Wound Depth (cm) 0.5 cm 12/12/22 1112   Wound Volume (cm^3) 1.125 cm^3 12/12/22 1112   Wound Surface Area (cm^2) 2.25 cm^2 12/12/22 1112   Care Cleansed with:;Antimicrobial agent;Soap and water 12/12/22 1112   Dressing Applied 12/12/22 1112   Periwound Care Moisturizer applied 12/12/22 1112               Assessment:       1. Ulcer of right great toe due to diabetes mellitus    2. Type 2 diabetes mellitus with foot ulcer, without long-term current use of insulin    3. Uncontrolled type 2 diabetes mellitus with hyperglycemia    4. Diabetic polyneuropathy associated with type 2 diabetes mellitus    5. Cellulitis of great toe of right foot    6. Medically noncompliant    7. Staph aureus infection    8. Group B streptococcal infection    9. Morbid obesity    10. Essential hypertension, benign    11. Bipolar II disorder         Right hallux DFU: first clinic visit 5/19/22, neg xray: recalcitrant to healing complicated by his noncompliance and clinic absence; deterioration noted on 11/22/22 with toe cellulitis: rx bactrim and augmentin  Right foot MRI 10/5/22: no osteomyelitis  Prior left 2nd toe osteomyelitis with partial amputation by Dr CAMERON driscoll 2020, New ulcer left 2nd toe volar side: noted 6/9/22: closed    diabetes, hba1c 9.1 April 2022, down to 7.3 July 2022  Morbid obesity with bmi 54  Hypertension   Hyperlipidemia  H/o depression/bipolar  Medical noncompliance    Results for orders placed or performed during the hospital encounter of 08/12/22 (from the past 2160 hour(s))   MRI Foot (Forefoot) Right W W/O Contrast    Impression    The middle phalanx of the 2nd toe and the distal phalanges of the 3rd, 4th, and 5th toes are obscured by poor fat  saturation.  Osteomyelitis is considered unlikely but cannot be entirely excluded.    An ulcer is present to the base of the distal phalanx of the great toe.  No evidence for osteomyelitis is present to the distal phalanx of the great toe.  No MR evidence is present to suggest septic arthritis.    Interval metal artifact noted to the plantar aspect of the forefoot.  Has the patient had recent surgical instrumentation or a metallic foreign body?      Electronically signed by: Mc Schuler  Date:    10/05/2022  Time:    14:04         Plan:           Plan of Care:    Debrided the DFU  I am going to call in 2 weeks of cipro and see if this may help a bit more  See roberts this week for ongoing hyperglycemia. Pt can't afford his insulin like he used to be on  Nutrition: Must have a high protein diet to support wound  healing; (if renal disease, see nephrologist for amount allowed):  this should be over 100g protein /day (if no kidney issues); Also rec MVI along with vit C, vit D, zinc and Sidney  Diabetes: recent a1c down to under 8 but has lots of high cbg readings , doesn't folllow good diabetic diet  Continue offloading with jojo for now  Return to clinic 1 week

## 2022-12-13 NOTE — PROCEDURES
"Debridement    Date/Time: 12/12/2022 10:53 AM  Performed by: Soheila Vidal MD  Authorized by: Soheila Vidal MD   Associated wounds:        (Retired) Wound 11/22/22 1053 Diabetic Ulcer Right medial;plantar Toe, first #1  Time out: Immediately prior to procedure a "time out" was called to verify the correct patient, procedure, equipment, support staff and site/side marked as required.    Consent Done?:  Yes (Written)    Preparation: Patient was prepped and draped in usual sterile fashion    Local anesthesia used?: Yes    Local anesthetic:  Topical anesthetic    Wound Details:    Location:  Right foot    Location:  Right 1st Toe    Type of Debridement:  Excisional       Length (cm):  1.5       Area (sq cm):  2.25       Width (cm):  1.5       Percent Debrided (%):  100       Depth (cm):  0.5       Total Area Debrided (sq cm):  2.25    Depth of debridement:  Subcutaneous tissue    Tissue debrided:  Dermis, Epidermis and Subcutaneous    Devitalized tissue debrided:  Biofilm and Slough    Instruments:  Curette    Bleeding:  Moderate  Hemostasis Achieved: Yes    Method Used:  Pressure and Silver Nitrate  Patient tolerance:  Patient tolerated the procedure well with no immediate complications  "

## 2022-12-20 ENCOUNTER — HOSPITAL ENCOUNTER (OUTPATIENT)
Dept: WOUND CARE | Facility: HOSPITAL | Age: 52
Discharge: HOME | End: 2022-12-20
Attending: EMERGENCY MEDICINE
Payer: MEDICARE

## 2022-12-20 VITALS
SYSTOLIC BLOOD PRESSURE: 105 MMHG | BODY MASS INDEX: 45.1 KG/M2 | RESPIRATION RATE: 18 BRPM | HEIGHT: 70 IN | TEMPERATURE: 98 F | HEART RATE: 87 BPM | WEIGHT: 315 LBS | DIASTOLIC BLOOD PRESSURE: 84 MMHG

## 2022-12-20 DIAGNOSIS — E66.01 MORBID OBESITY: ICD-10-CM

## 2022-12-20 DIAGNOSIS — L03.031 CELLULITIS OF GREAT TOE OF RIGHT FOOT: ICD-10-CM

## 2022-12-20 DIAGNOSIS — E11.65 UNCONTROLLED TYPE 2 DIABETES MELLITUS WITH HYPERGLYCEMIA: ICD-10-CM

## 2022-12-20 DIAGNOSIS — Z91.199 MEDICALLY NONCOMPLIANT: ICD-10-CM

## 2022-12-20 DIAGNOSIS — E11.621 TYPE 2 DIABETES MELLITUS WITH FOOT ULCER, WITHOUT LONG-TERM CURRENT USE OF INSULIN: ICD-10-CM

## 2022-12-20 DIAGNOSIS — L97.519 ULCER OF RIGHT GREAT TOE DUE TO DIABETES MELLITUS: Primary | ICD-10-CM

## 2022-12-20 DIAGNOSIS — E11.42 DIABETIC POLYNEUROPATHY ASSOCIATED WITH TYPE 2 DIABETES MELLITUS: ICD-10-CM

## 2022-12-20 DIAGNOSIS — A49.01 STAPH AUREUS INFECTION: ICD-10-CM

## 2022-12-20 DIAGNOSIS — F31.81 BIPOLAR II DISORDER: ICD-10-CM

## 2022-12-20 DIAGNOSIS — E11.621 ULCER OF RIGHT GREAT TOE DUE TO DIABETES MELLITUS: Primary | ICD-10-CM

## 2022-12-20 DIAGNOSIS — L97.509 TYPE 2 DIABETES MELLITUS WITH FOOT ULCER, WITHOUT LONG-TERM CURRENT USE OF INSULIN: ICD-10-CM

## 2022-12-20 PROCEDURE — 27000999 HC MEDICAL RECORD PHOTO DOCUMENTATION

## 2022-12-20 PROCEDURE — 11042 DBRDMT SUBQ TIS 1ST 20SQCM/<: CPT

## 2022-12-20 RX ORDER — INSULIN LISPRO 100 [IU]/ML
30 INJECTION, SOLUTION INTRAVENOUS; SUBCUTANEOUS
COMMUNITY
End: 2023-03-03 | Stop reason: ALTCHOICE

## 2022-12-20 RX ORDER — INSULIN GLARGINE AND LIXISENATIDE 100; 33 U/ML; UG/ML
INJECTION, SOLUTION SUBCUTANEOUS
COMMUNITY

## 2022-12-20 NOTE — PROCEDURES
"Debridement    Date/Time: 12/20/2022 11:00 AM  Performed by: Soheila Vidal MD  Authorized by: Soheila Vidal MD     Time out: Immediately prior to procedure a "time out" was called to verify the correct patient, procedure, equipment, support staff and site/side marked as required.    Consent Done?:  Yes (Written)    Preparation: Patient was prepped and draped in usual sterile fashion    Local anesthesia used?: Yes    Local anesthetic:  Topical anesthetic    Wound Details:    Location:  Right foot    Location:  Right 1st Toe    Type of Debridement:  Excisional       Length (cm):  1.9       Area (sq cm):  2.09       Width (cm):  1.1       Percent Debrided (%):  100       Depth (cm):  1.9       Total Area Debrided (sq cm):  2.09    Depth of debridement:  Subcutaneous tissue    Tissue debrided:  Dermis, Epidermis and Subcutaneous    Devitalized tissue debrided:  Slough and Biofilm    Instruments:  Curette    Bleeding:  Moderate  Hemostasis Achieved: Yes    Method Used:  Pressure  Patient tolerance:  Patient tolerated the procedure well with no immediate complications  "

## 2022-12-20 NOTE — PROGRESS NOTES
Subjective:       Patient ID: Jong Clayton is a 52 y.o. male.    Chief Complaint: No chief complaint on file.      53 y/o  WM with morbid obesity, diabetes, neuropathy hypertension, dyslipidemia, chronic back pain, bipolar and depression who has battled various DFU's with osteomyelitis (left 2nd toe 2019 and again 2020). I have treated him in past for this left 2nd toe and noted then he was noncompliant with my recommendations. I last saw him for that left 2nd toe in March 2020 where he had a bulbous left 2nd hammer toe with +MRI for osteo. He never returned as scheduled but ultimately followed through with my prior recs of distal tip amputation.    He returned to clinic in mid May 2022 complaining of a chronic untreated right plantar great toe ulcer which began in Dec 2021 that he tried to self treat. His wife finally got him to agree to come to clinic but unfortunately he continues to demonstrate noncompliance and more often than not misses visits here, rescheduled and then misses more.  It has been very difficult to treat because of that as well as what insurance would allow us to do.  (For example they refused my request for IV antibiotics and the fall of 2022).  Back in the summer I ordered an MRI of this right big toe which took him months to get done (but finally had it in October and it did not demonstrate osteomyelitis).  He has been on various antibiotics though at times , July and again August when this ulcer looked worse.  He did not return to clinic until October 7th . He was last here one month ago ....again made and cancelled multiple appointments. Finally comes in today on 11/22/22 with false skin over ulcer that is bruised as well as what appears to be cellulitis of great toe. I prescribed 2 antibiotics: bactrim and augmentin. Cellulitis and periwound blistering resolved but left with a larger DFU that is not getting better. I ordered cipro for 2 weeks. He has ongoing significant hyperglycemia.   He tells me he had abnormal cholesterol panel on recent labs but they did not do a hemoglobin A1c.  The ulcer actually looks deeper to me today      Review of Systems   Constitutional:  Positive for fatigue. Negative for chills and fever.   HENT: Negative.     Respiratory: Negative.     Cardiovascular: Negative.    Gastrointestinal: Negative.    Musculoskeletal:  Positive for back pain.   Skin:  Positive for wound (see hpi).   Neurological:  Positive for numbness (to both feet). Negative for tremors, seizures, syncope and speech difficulty.       Objective:       Vitals:    12/20/22 1148   BP: 105/84   Pulse: 87   Resp: 18   Temp: 97.9 °F (36.6 °C)        Physical Exam  Constitutional:       Appearance: He is morbidly obese.   HENT:      Head: Normocephalic and atraumatic.   Eyes:      Conjunctiva/sclera: Conjunctivae normal.   Cardiovascular:      Pulses: Normal pulses.           Dorsalis pedis pulses are 2+ on the right side and 2+ on the left side.      Comments: +hair on legs  Musculoskeletal:      Right lower leg: No edema.      Left lower leg: No edema.        Feet:    Feet:      Comments: Hallux valgus deformity noted  Skin:     Capillary Refill: Capillary refill takes less than 2 seconds.   Neurological:      General: No focal deficit present.      Mental Status: He is alert and oriented to person, place, and time. Mental status is at baseline.            (Retired) Wound 11/22/22 1053 Diabetic Ulcer Right medial;plantar Toe, first #1 (Active)   11/22/22 1053    Pre-existing: Yes   Primary Wound Type: Diabetic ulc   Side: Right   Orientation: medial;plantar   Location: Toe, first   Wound Number: #1   Ankle-Brachial Index: paul done 12/20/22 = right dp = 1.19, pt = 1.54; Left dp = 1.54, pt= 1.03   Pulses: + palpable/+ doppler, right dp/pt = biphasic, left dp = triphasic, pt = biphasic   Removal Indication and Assessment:    Wound Outcome:    (Retired) Wound Type:    (Retired) Wound Length (cm):    (Retired)  Wound Width (cm):    (Retired) Depth (cm):    Wound Description (Comments):    Removal Indications:    Wound Image   12/20/22 1138   Dressing Appearance Moist drainage 12/20/22 1138   Drainage Amount Large 12/20/22 1138   Drainage Characteristics/Odor Brown;Serosanguineous 12/20/22 1138   Appearance Pink;Eschar 12/20/22 1138   Black (%), Wound Tissue Color 0 % 12/20/22 1138   Red (%), Wound Tissue Color 60 % 12/20/22 1138   Yellow (%), Wound Tissue Color 40 % 12/20/22 1138   Periwound Area Hull 12/20/22 1138   Wound Edges Defined 12/20/22 1138   Wound Length (cm) 1.9 cm 12/20/22 1138   Wound Width (cm) 1.1 cm 12/20/22 1138   Wound Depth (cm) 1.9 cm 12/20/22 1138   Wound Volume (cm^3) 3.971 cm^3 12/20/22 1138   Wound Surface Area (cm^2) 2.09 cm^2 12/20/22 1138   Care Cleansed with:;Soap and water;Applied: 12/20/22 1138   Dressing Applied 12/20/22 1138               Assessment:       1. Ulcer of right great toe due to diabetes mellitus    2. Type 2 diabetes mellitus with foot ulcer, without long-term current use of insulin    3. Uncontrolled type 2 diabetes mellitus with hyperglycemia    4. Diabetic polyneuropathy associated with type 2 diabetes mellitus    5. Cellulitis of great toe of right foot    6. Medically noncompliant    7. Staph aureus infection    8. Morbid obesity    9. Bipolar II disorder         Right hallux DFU: first clinic visit 5/19/22, neg xray: recalcitrant to healing complicated by his noncompliance and clinic absence; deterioration noted on 11/22/22 with toe cellulitis: rx bactrim and augmentin and then 2 weeks of Cipro prescribed on 12/13/2022  Right foot MRI 10/5/22: no osteomyelitis  Prior left 2nd toe osteomyelitis with partial amputation by Dr CAMERON driscoll 2020, New ulcer left 2nd toe volar side: noted 6/9/22: closed    diabetes, hba1c 9.1 April 2022, down to 7.3 July 2022  Morbid obesity with bmi 54  Hypertension   Hyperlipidemia  H/o depression/bipolar  Medical noncompliance    Results for  orders placed or performed during the hospital encounter of 08/12/22 (from the past 2160 hour(s))   MRI Foot (Forefoot) Right W W/O Contrast    Impression    The middle phalanx of the 2nd toe and the distal phalanges of the 3rd, 4th, and 5th toes are obscured by poor fat saturation.  Osteomyelitis is considered unlikely but cannot be entirely excluded.    An ulcer is present to the base of the distal phalanx of the great toe.  No evidence for osteomyelitis is present to the distal phalanx of the great toe.  No MR evidence is present to suggest septic arthritis.    Interval metal artifact noted to the plantar aspect of the forefoot.  Has the patient had recent surgical instrumentation or a metallic foreign body?      Electronically signed by: Mc Schuler  Date:    10/05/2022  Time:    14:04     Patient brought me labs dated 12/09/2022  No hemoglobin A1c   Cholesterol 271, triglycerides 587, , glucose 254, creatinine 0.98, liver enzymes normal, H/H 11/35    Plan:           Plan of Care:    Debrided the DFU  It is worse and deeper and down to bone level although not overtly exposed.  I do think he will end up with an amputation.  It has been extremely hard to treat him.  He is very unhealthy, persistently hyperglycemic  He had recent labs but did did not include inflammatory markers are hemoglobin A1c.  I will plan to order this for next week as well as another x-ray of the toe   nutrition:  He is nutritionally on sound.  His recent cholesterol panel was very abnormal.  He tells me that he wants to eat and not worry about his diet until after the holidays.  He has ongoing hyperglycemia.  All of these issues are inhibiting his wound healing.  I encouraged him to have a low car but high protein diet to support wound  healing; (if renal disease, see nephrologist for amount allowed):  this should be over 100g protein /day (if no kidney issues); Also rec MVI along with vit C, vit D, zinc and Sidney  Diabetes: recent  a1c down to under 8 but has lots of high cbg readings , doesn't folllow good diabetic diet  Continue offloading .  I think he is too large for a rolling knee scooter.  He does not have one  Return to clinic 1 week

## 2022-12-20 NOTE — PATIENT INSTRUCTIONS
Pt seen today by:     Self care DRESSING INSTRUCTIONS:      Wound location: Right Great Toe     Dressings to be changed every other day and as needed if soiled or not intact.  Cleanse wound with wound cleanser or saline  Cover with alginate silver  Cover with abd pad and secure with a shaka/cover roll tape  Wear Darco shoe  Change dressing daily    Return to clinic on Tuesday, December 27th, 2022 at 1:00 pm    Wound may have been debrided in clinic: if so, WHAT YOU NEED TO KNOW:    Debridement is the removal of infected, damaged, or dead tissue so a wound can heal properly. Your wound may need more than one debridement. Debridement can cause bleeding, and a small amount of blood is expected.  AFTER A DEBRIDEMENT:  Keep your wound clean and dry. Do not remove the dressing unless instructed.  Follow the wound care orders provided to you or your home health care provider.  If you have pain, take over the counter pain relievers or pain medication if prescribed.  Elevate the wound and limit excessive activity to prevent bleeding and/or swelling in your wound.  If you see blood coming through the dressing, apply gauze and tape over the dressing and hold firm pressure to the wound with your hand for 5-10 minutes continuously, without peeking, to help the bleeding stop.  Contact [facility] wound care team at [phone] or go to the nearest Emergency department if:  You have a fever greater than 101 taken by mouth.  Your pain gets worse or does not go away, even after taking your regular pain medicine.  Your skin around your wound is red, hot, swollen, or draining pus.  You have bleeding that continues to come through the dressing after holding pressure for 10 minutes   The current daily value (%DV) for protein is 50 grams per day and is meant as a general goal for most people. Further increasing your dietary protein intake is very important for wound healing. Typically one needs over 100g of protein per day to help  with wound healing needs.  If you are a dialysis patient or have problems with your kidneys, talk to your Nephrologist about how much protein you can take in with your condition.  Examples of high protein items that can be added to your diet include: eggs, chicken, red meats, almonds, cottage cheese, Greek yogurt, beans, and peanut butter.  Fortified protein bars, shakes and drinks can add 15-30 additional grams of protein per serving.   Also add:   1 daily general multivitamin   Sidney : 1 packet twice daily   Vitamin C : 500mg twice daily   Zinc 220 mg daily  Vit D : once daily    Call our [facility] wound clinic for questions/concerns a [phone] .

## 2022-12-30 ENCOUNTER — HOSPITAL ENCOUNTER (OUTPATIENT)
Dept: WOUND CARE | Facility: HOSPITAL | Age: 52
Discharge: HOME | End: 2022-12-30
Attending: EMERGENCY MEDICINE
Payer: MEDICARE

## 2022-12-30 ENCOUNTER — HOSPITAL ENCOUNTER (OUTPATIENT)
Dept: RADIOLOGY | Facility: HOSPITAL | Age: 52
Discharge: HOME | End: 2022-12-30
Attending: EMERGENCY MEDICINE
Payer: MEDICARE

## 2022-12-30 VITALS
BODY MASS INDEX: 45.1 KG/M2 | RESPIRATION RATE: 22 BRPM | WEIGHT: 315 LBS | DIASTOLIC BLOOD PRESSURE: 80 MMHG | SYSTOLIC BLOOD PRESSURE: 123 MMHG | HEIGHT: 70 IN | HEART RATE: 72 BPM | TEMPERATURE: 98 F

## 2022-12-30 DIAGNOSIS — L03.031 CELLULITIS OF GREAT TOE OF RIGHT FOOT: ICD-10-CM

## 2022-12-30 DIAGNOSIS — E11.621 ULCER OF RIGHT GREAT TOE DUE TO DIABETES MELLITUS: Primary | ICD-10-CM

## 2022-12-30 DIAGNOSIS — A49.1 GROUP B STREPTOCOCCAL INFECTION: ICD-10-CM

## 2022-12-30 DIAGNOSIS — A49.01 STAPH AUREUS INFECTION: ICD-10-CM

## 2022-12-30 DIAGNOSIS — Z91.199 MEDICALLY NONCOMPLIANT: ICD-10-CM

## 2022-12-30 DIAGNOSIS — E11.65 UNCONTROLLED TYPE 2 DIABETES MELLITUS WITH HYPERGLYCEMIA: ICD-10-CM

## 2022-12-30 DIAGNOSIS — L97.519 ULCER OF RIGHT GREAT TOE DUE TO DIABETES MELLITUS: Primary | ICD-10-CM

## 2022-12-30 DIAGNOSIS — L97.509 TYPE 2 DIABETES MELLITUS WITH FOOT ULCER, WITHOUT LONG-TERM CURRENT USE OF INSULIN: ICD-10-CM

## 2022-12-30 DIAGNOSIS — E11.621 TYPE 2 DIABETES MELLITUS WITH FOOT ULCER, WITHOUT LONG-TERM CURRENT USE OF INSULIN: ICD-10-CM

## 2022-12-30 DIAGNOSIS — E11.42 DIABETIC POLYNEUROPATHY ASSOCIATED WITH TYPE 2 DIABETES MELLITUS: ICD-10-CM

## 2022-12-30 DIAGNOSIS — F31.81 BIPOLAR II DISORDER: ICD-10-CM

## 2022-12-30 LAB
CRP SERPL-MCNC: 9.9 MG/L
ERYTHROCYTE [SEDIMENTATION RATE] IN BLOOD: 18 MM/HR (ref 0–15)
EST. AVERAGE GLUCOSE BLD GHB EST-MCNC: 271.9 MG/DL
HBA1C MFR BLD: 11.1 %
PREALB SERPL-MCNC: 27.5 MG/DL (ref 18–45)

## 2022-12-30 PROCEDURE — 36415 COLL VENOUS BLD VENIPUNCTURE: CPT | Performed by: EMERGENCY MEDICINE

## 2022-12-30 PROCEDURE — 27000999 HC MEDICAL RECORD PHOTO DOCUMENTATION

## 2022-12-30 PROCEDURE — 83036 HEMOGLOBIN GLYCOSYLATED A1C: CPT | Performed by: EMERGENCY MEDICINE

## 2022-12-30 PROCEDURE — 85651 RBC SED RATE NONAUTOMATED: CPT | Performed by: EMERGENCY MEDICINE

## 2022-12-30 PROCEDURE — 84134 ASSAY OF PREALBUMIN: CPT | Performed by: EMERGENCY MEDICINE

## 2022-12-30 PROCEDURE — 86140 C-REACTIVE PROTEIN: CPT | Performed by: EMERGENCY MEDICINE

## 2022-12-30 PROCEDURE — 73660 X-RAY EXAM OF TOE(S): CPT | Mod: TC,RT

## 2022-12-30 PROCEDURE — 11042 DBRDMT SUBQ TIS 1ST 20SQCM/<: CPT

## 2022-12-30 NOTE — PROCEDURES
"Debridement    Date/Time: 12/30/2022 8:14 AM  Performed by: Soheila Vidal MD  Authorized by: Soheila Vidal MD   Associated wounds:        (Retired) Wound 11/22/22 1053 Diabetic Ulcer Right medial;plantar Toe, first #1  Time out: Immediately prior to procedure a "time out" was called to verify the correct patient, procedure, equipment, support staff and site/side marked as required.    Consent Done?:  Yes (Written)    Preparation: Patient was prepped and draped in usual sterile fashion    Local anesthesia used?: Yes    Local anesthetic:  Topical anesthetic    Wound Details:    Location:  Right foot    Location:  Right 1st Toe    Type of Debridement:  Excisional       Length (cm):  2       Area (sq cm):  2       Width (cm):  1       Percent Debrided (%):  100       Depth (cm):  1.3       Total Area Debrided (sq cm):  2    Depth of debridement:  Subcutaneous tissue    Tissue debrided:  Dermis, Epidermis and Subcutaneous    Devitalized tissue debrided:  Biofilm, Callus and Slough    Instruments:  Curette    Bleeding:  Moderate  Hemostasis Achieved: Yes    Method Used:  Silver Nitrate and Pressure  Patient tolerance:  Patient tolerated the procedure well with no immediate complications  "

## 2022-12-30 NOTE — PATIENT INSTRUCTIONS
Pt seen today by:     Self care DRESSING INSTRUCTIONS:      Wound location: Right Great Toe     Dressings to be changed every other day and as needed if soiled or not intact.  Cleanse wound with wound cleanser or saline  Cover with calcium alginate silver  May alternate wound care with use of therahoney gel  Cover with abd pad and secure with a shaka/cover roll tape  Wear Darco shoe  Change dressing daily    Return to clinic on Friday Jan. 6th, 2023 at 8am     Wound may have been debrided in clinic: if so, WHAT YOU NEED TO KNOW:    Debridement is the removal of infected, damaged, or dead tissue so a wound can heal properly. Your wound may need more than one debridement. Debridement can cause bleeding, and a small amount of blood is expected.  AFTER A DEBRIDEMENT:  Keep your wound clean and dry. Do not remove the dressing unless instructed.  Follow the wound care orders provided to you or your home health care provider.  If you have pain, take over the counter pain relievers or pain medication if prescribed.  Elevate the wound and limit excessive activity to prevent bleeding and/or swelling in your wound.  If you see blood coming through the dressing, apply gauze and tape over the dressing and hold firm pressure to the wound with your hand for 5-10 minutes continuously, without peeking, to help the bleeding stop.  Contact [facility] wound care team at [phone] or go to the nearest Emergency department if:  You have a fever greater than 101 taken by mouth.  Your pain gets worse or does not go away, even after taking your regular pain medicine.  Your skin around your wound is red, hot, swollen, or draining pus.  You have bleeding that continues to come through the dressing after holding pressure for 10 minutes   The current daily value (%DV) for protein is 50 grams per day and is meant as a general goal for most people. Further increasing your dietary protein intake is very important for wound healing.  Typically one needs over 100g of protein per day to help with wound healing needs.  If you are a dialysis patient or have problems with your kidneys, talk to your Nephrologist about how much protein you can take in with your condition.  Examples of high protein items that can be added to your diet include: eggs, chicken, red meats, almonds, cottage cheese, Greek yogurt, beans, and peanut butter.  Fortified protein bars, shakes and drinks can add 15-30 additional grams of protein per serving.   Also add:   1 daily general multivitamin   Sidney : 1 packet twice daily   Vitamin C : 500mg twice daily   Zinc 220 mg daily  Vit D : once daily    Call our [facility] wound clinic for questions/concerns a [phone] .

## 2022-12-30 NOTE — PROGRESS NOTES
Subjective:       Patient ID: Jong Clayton is a 52 y.o. male.    Chief Complaint: No chief complaint on file.      53 y/o  WM with morbid obesity, diabetes, neuropathy hypertension, dyslipidemia, chronic back pain, bipolar and depression who has battled various DFU's with osteomyelitis (left 2nd toe 2019 and again 2020). I have treated him in past for this left 2nd toe and noted then he was noncompliant with my recommendations. I last saw him for that left 2nd toe in March 2020 where he had a bulbous left 2nd hammer toe with +MRI for osteo. He never returned as scheduled but ultimately followed through with my prior recs of distal tip amputation.    He returned to clinic in mid May 2022 complaining of a chronic untreated right plantar great toe ulcer which began in Dec 2021 that he tried to self treat. His wife finally got him to agree to come to clinic but unfortunately he continues to demonstrate noncompliance and more often than not misses visits here, rescheduled and then misses more.  Is always extremely hyperglycemic and is super morbidly obese.  It has been very difficult to treat because of that as well as what insurance would allow us to do.  (For example they refused my request for IV  and IM antibiotics  fall of 2022).  Back in the summer I ordered an MRI of this right big toe which took him months to get done (but finally had it in October and it did not demonstrate osteomyelitis).  Patient came into the end of November after a greater than 1 month absence with a significant decline in the right toe wound with evidence of bruising . I prescribed 2 antibiotics: bactrim and augmentin. Cellulitis and periwound blistering resolved but left with a larger DFU that is not getting better. I ordered cipro for 2 weeks.  He tells me he had abnormal cholesterol panel on recent labs but they did not do a hemoglobin A1c.  I will order an x-ray today as well as inflammatory markers and an A1c.  I suspect he will need  IV antibiotics and hopefully after January his new supplement insurance will help us accomplish this.  He is at high-risk for needing this toe amputated and I have told him this.      Review of Systems   Constitutional:  Positive for fatigue. Negative for chills and fever.   HENT: Negative.     Respiratory: Negative.     Cardiovascular: Negative.    Gastrointestinal: Negative.    Musculoskeletal:  Positive for back pain.   Skin:  Positive for wound (see hpi).   Neurological:  Positive for numbness (to both feet). Negative for tremors, seizures, syncope and speech difficulty.       Objective:       Vitals:    12/30/22 0842   BP: 123/80   Pulse: 72   Resp: (!) 22   Temp: 98.2 °F (36.8 °C)          Physical Exam  Constitutional:       Appearance: He is morbidly obese.   HENT:      Head: Normocephalic and atraumatic.   Eyes:      Conjunctiva/sclera: Conjunctivae normal.   Cardiovascular:      Pulses: Normal pulses.           Dorsalis pedis pulses are 2+ on the right side and 2+ on the left side.      Comments: +hair on legs  Musculoskeletal:      Right lower leg: No edema.      Left lower leg: No edema.        Feet:    Feet:      Comments: Hallux valgus deformity noted  Skin:     Capillary Refill: Capillary refill takes less than 2 seconds.   Neurological:      General: No focal deficit present.      Mental Status: He is alert and oriented to person, place, and time. Mental status is at baseline.            (Retired) Wound 11/22/22 1053 Diabetic Ulcer Right medial;plantar Toe, first #1 (Active)   11/22/22 1053    Pre-existing: Yes   Primary Wound Type: Diabetic ulc   Side: Right   Orientation: medial;plantar   Location: Toe, first   Wound Number: #1   Ankle-Brachial Index: paul done 12/20/22 = right dp = 1.19, pt = 1.54; Left dp = 1.54, pt= 1.03   Pulses: + palpable/+ doppler, right dp/pt = biphasic, left dp = biphasic, pt = biphasic   Removal Indication and Assessment:    Wound Outcome:    (Retired) Wound Type:     (Retired) Wound Length (cm):    (Retired) Wound Width (cm):    (Retired) Depth (cm):    Wound Description (Comments):    Removal Indications:    Wound Image   12/30/22 0846   Dressing Appearance Intact;Moist drainage 12/30/22 0846   Drainage Amount Moderate 12/30/22 0846   Drainage Characteristics/Odor Serosanguineous 12/30/22 0846   Appearance Pink 12/30/22 0846   Black (%), Wound Tissue Color 0 % 12/30/22 0846   Red (%), Wound Tissue Color 100 % 12/30/22 0846   Yellow (%), Wound Tissue Color 0 % 12/30/22 0846   Periwound Area Intact 12/30/22 0846   Wound Edges Callused 12/30/22 0846   Wound Length (cm) 2 cm 12/30/22 0846   Wound Width (cm) 1 cm 12/30/22 0846   Wound Depth (cm) 1.3 cm 12/30/22 0846   Wound Volume (cm^3) 2.6 cm^3 12/30/22 0846   Wound Surface Area (cm^2) 2 cm^2 12/30/22 0846   Care Cleansed with:;Wound cleanser;Applied: 12/30/22 0846   Dressing Applied;Honey;Calcium alginate;Silver;Absorptive Pad;Rolled gauze 12/30/22 0846               Assessment:       1. Ulcer of right great toe due to diabetes mellitus    2. Type 2 diabetes mellitus with foot ulcer, without long-term current use of insulin    3. Uncontrolled type 2 diabetes mellitus with hyperglycemia    4. Diabetic polyneuropathy associated with type 2 diabetes mellitus    5. Cellulitis of great toe of right foot    6. Medically noncompliant    7. Staph aureus infection    8. Group B streptococcal infection    9. Bipolar II disorder         Right hallux DFU: first clinic visit 5/19/22, neg xray: recalcitrant to healing complicated by his noncompliance and clinic absence; deterioration noted on 11/22/22 with toe cellulitis: rx bactrim and augmentin and then 2 weeks of Cipro prescribed on 12/13/2022: recalcitrant  Right foot MRI 10/5/22: no osteomyelitis  Prior left 2nd toe osteomyelitis with partial amputation by Dr CAMERON driscoll 2020, New ulcer left 2nd toe volar side: noted 6/9/22: closed    diabetes, hba1c 9.1 April 2022, down to 7.3 July  2022  Morbid obesity with bmi 54  Hypertension   Hyperlipidemia  H/o depression/bipolar  Medical noncompliance    Results for orders placed or performed during the hospital encounter of 08/12/22 (from the past 2160 hour(s))   MRI Foot (Forefoot) Right W W/O Contrast    Impression    The middle phalanx of the 2nd toe and the distal phalanges of the 3rd, 4th, and 5th toes are obscured by poor fat saturation.  Osteomyelitis is considered unlikely but cannot be entirely excluded.    An ulcer is present to the base of the distal phalanx of the great toe.  No evidence for osteomyelitis is present to the distal phalanx of the great toe.  No MR evidence is present to suggest septic arthritis.    Interval metal artifact noted to the plantar aspect of the forefoot.  Has the patient had recent surgical instrumentation or a metallic foreign body?      Electronically signed by: Mc Schuler  Date:    10/05/2022  Time:    14:04     Patient brought me labs dated 12/09/2022  No hemoglobin A1c   Cholesterol 271, triglycerides 587, , glucose 254, creatinine 0.98, liver enzymes normal, H/H 11/35    Plan:           Plan of Care:    Debrided the DFU  It is worse and deeper and down to bone level although not overtly exposed.  I do think he will end up with an amputation.  It has been extremely hard to treat him.  He is very unhealthy, persistently hyperglycemic   Today I have ordered inflammatory markers , an xray and  hemoglobin A1c.    Diabetes: recent a1c down to under 8 but has lots of high cbg readings , checking another today;  he admits to bad diet  Continue offloading .  I think he is too large for a rolling knee scooter.  He does not have one  Return to clinic 1 week  Prognosis guarded.  Most likely will end up with an amputation but maybe with his new insurance supplement for 2023 I can actually give IV antibiotics like I wanted to do in the fall of 2022

## 2023-01-06 ENCOUNTER — HOSPITAL ENCOUNTER (OUTPATIENT)
Dept: WOUND CARE | Facility: HOSPITAL | Age: 53
Discharge: HOME | End: 2023-01-06
Attending: EMERGENCY MEDICINE
Payer: MEDICARE

## 2023-01-06 VITALS
BODY MASS INDEX: 45.1 KG/M2 | HEART RATE: 73 BPM | TEMPERATURE: 98 F | RESPIRATION RATE: 22 BRPM | SYSTOLIC BLOOD PRESSURE: 131 MMHG | WEIGHT: 315 LBS | DIASTOLIC BLOOD PRESSURE: 79 MMHG | HEIGHT: 70 IN

## 2023-01-06 DIAGNOSIS — A49.1 GROUP B STREPTOCOCCAL INFECTION: ICD-10-CM

## 2023-01-06 DIAGNOSIS — E11.621 ULCER OF RIGHT GREAT TOE DUE TO DIABETES MELLITUS: Primary | ICD-10-CM

## 2023-01-06 DIAGNOSIS — F31.81 BIPOLAR II DISORDER: ICD-10-CM

## 2023-01-06 DIAGNOSIS — Z91.199 MEDICALLY NONCOMPLIANT: ICD-10-CM

## 2023-01-06 DIAGNOSIS — L97.509 TYPE 2 DIABETES MELLITUS WITH FOOT ULCER, WITHOUT LONG-TERM CURRENT USE OF INSULIN: ICD-10-CM

## 2023-01-06 DIAGNOSIS — L03.031 CELLULITIS OF GREAT TOE OF RIGHT FOOT: ICD-10-CM

## 2023-01-06 DIAGNOSIS — E11.42 DIABETIC POLYNEUROPATHY ASSOCIATED WITH TYPE 2 DIABETES MELLITUS: ICD-10-CM

## 2023-01-06 DIAGNOSIS — A49.01 STAPH AUREUS INFECTION: ICD-10-CM

## 2023-01-06 DIAGNOSIS — L97.519 ULCER OF RIGHT GREAT TOE DUE TO DIABETES MELLITUS: Primary | ICD-10-CM

## 2023-01-06 DIAGNOSIS — E11.621 TYPE 2 DIABETES MELLITUS WITH FOOT ULCER, WITHOUT LONG-TERM CURRENT USE OF INSULIN: ICD-10-CM

## 2023-01-06 DIAGNOSIS — E11.65 UNCONTROLLED TYPE 2 DIABETES MELLITUS WITH HYPERGLYCEMIA: ICD-10-CM

## 2023-01-06 LAB — POCT GLUCOSE: 331 MG/DL (ref 70–110)

## 2023-01-06 PROCEDURE — 99499 NO LOS: ICD-10-PCS | Mod: NSCH,,, | Performed by: EMERGENCY MEDICINE

## 2023-01-06 PROCEDURE — 99499 UNLISTED E&M SERVICE: CPT | Mod: NSCH,,, | Performed by: EMERGENCY MEDICINE

## 2023-01-06 PROCEDURE — 27000999 HC MEDICAL RECORD PHOTO DOCUMENTATION

## 2023-01-06 PROCEDURE — 11042 DBRDMT SUBQ TIS 1ST 20SQCM/<: CPT | Mod: NSCH,,, | Performed by: EMERGENCY MEDICINE

## 2023-01-06 PROCEDURE — 11042 DEBRIDEMENT: ICD-10-PCS | Mod: NSCH,,, | Performed by: EMERGENCY MEDICINE

## 2023-01-06 PROCEDURE — 11042 DBRDMT SUBQ TIS 1ST 20SQCM/<: CPT

## 2023-01-06 NOTE — PROGRESS NOTES
Subjective:       Patient ID: Jong Clayton is a 52 y.o. male.    Chief Complaint: No chief complaint on file.      53 y/o  WM with morbid obesity, diabetes, neuropathy hypertension, dyslipidemia, chronic back pain, bipolar and depression who has battled various DFU's with osteomyelitis (left 2nd toe 2019 and again 2020). I have treated him in past for this left 2nd toe and noted then he was noncompliant with my recommendations. I last saw him for that left 2nd toe in March 2020 where he had a bulbous left 2nd hammer toe with +MRI for osteo. He never returned as scheduled but ultimately followed through with my prior recs of distal tip amputation.    He returned to clinic in mid May 2022 complaining of a chronic untreated right plantar great toe ulcer which began in Dec 2021 that he tried to self treat. His wife finally got him to agree to come to clinic but unfortunately he continues to demonstrate noncompliance and more often than not misses visits here, rescheduled and then misses more.  Is always extremely hyperglycemic and is super morbidly obese.  It has been very difficult to treat because of that as well as what insurance would allow us to do.  (For example they refused my request for IV  and IM antibiotics  fall of 2022).  Back in the summer I ordered an MRI of this right big toe which took him months to get done (but finally had it in October and it did not demonstrate osteomyelitis).  Patient came into the end of November after a greater than 1 month absence with a significant decline in the right toe wound with evidence of bruising . I prescribed 2 antibiotics: bactrim and augmentin. Cellulitis and periwound blistering resolved but left with a larger DFU that is not getting better. I ordered cipro for 2 weeks.  He tells me he had abnormal cholesterol panel on recent labs but they did not do a hemoglobin A1c. Last I ordered that +an xray; xray fine but a1c over 11.  He is at high-risk for needing this  toe amputated and I have told him this. He admits to really bad eating for last 6 months but will try to do better now      Review of Systems   Constitutional:  Positive for fatigue. Negative for chills and fever.   HENT: Negative.     Respiratory: Negative.     Cardiovascular: Negative.    Gastrointestinal: Negative.    Musculoskeletal:  Positive for back pain.   Skin:  Positive for wound (see hpi).   Neurological:  Positive for numbness (to both feet). Negative for tremors, seizures, syncope and speech difficulty.       Objective:       Vitals:    01/06/23 0809   BP: 131/79   Pulse: 73   Resp: (!) 22   Temp: 98.2 °F (36.8 °C)          Physical Exam  Constitutional:       Appearance: He is morbidly obese.   HENT:      Head: Normocephalic and atraumatic.   Eyes:      Conjunctiva/sclera: Conjunctivae normal.   Cardiovascular:      Pulses: Normal pulses.           Dorsalis pedis pulses are 2+ on the right side and 2+ on the left side.      Comments: +hair on legs  Musculoskeletal:      Right lower leg: No edema.      Left lower leg: No edema.        Feet:    Feet:      Comments: Hallux valgus deformity noted  Skin:     Capillary Refill: Capillary refill takes less than 2 seconds.   Neurological:      General: No focal deficit present.      Mental Status: He is alert and oriented to person, place, and time. Mental status is at baseline.            (Retired) Wound 11/22/22 1053 Diabetic Ulcer Right medial;plantar Toe, first #1 (Active)   11/22/22 1053    Pre-existing: Yes   Primary Wound Type: Diabetic ulc   Side: Right   Orientation: medial;plantar   Location: Toe, first   Wound Number: #1   Ankle-Brachial Index: paul done 12/20/22 = right dp = 1.19, pt = 1.54; Left dp = 1.54, pt= 1.03   Pulses: + palpable/+ doppler, right dp/pt =triphasic, left dp = triphasic, pt = triphasic   Removal Indication and Assessment:    Wound Outcome:    (Retired) Wound Type:    (Retired) Wound Length (cm):    (Retired) Wound Width (cm):     (Retired) Depth (cm):    Wound Description (Comments):    Removal Indications:    Wound Image    01/06/23 0827   Dressing Appearance Intact;Moist drainage 01/06/23 0827   Drainage Amount Large 01/06/23 0827   Drainage Characteristics/Odor Sanguineous;Malodorous;Serous 01/06/23 0827   Appearance Pink;Moist;Epithelialization;Bone 01/06/23 0827   Black (%), Wound Tissue Color 0 % 01/06/23 0827   Red (%), Wound Tissue Color 100 % 01/06/23 0827   Yellow (%), Wound Tissue Color 0 % 01/06/23 0827   Periwound Area Intact 01/06/23 0827   Wound Edges Defined;Callused 01/06/23 0827   Wound Length (cm) 1.6 cm 01/06/23 0827   Wound Width (cm) 0.9 cm 01/06/23 0827   Wound Depth (cm) 0.4 cm 01/06/23 0827   Wound Volume (cm^3) 0.576 cm^3 01/06/23 0827   Wound Surface Area (cm^2) 1.44 cm^2 01/06/23 0827   Undermining (depth (cm)/location) 0.8cm from 6-11 o'clock 01/06/23 0827   Care Cleansed with:;Wound cleanser;Applied: 01/06/23 0827   Dressing Applied;Honey;Calcium alginate;Silver;Absorptive Pad;Rolled gauze 01/06/23 0827               Assessment:       1. Ulcer of right great toe due to diabetes mellitus    2. Type 2 diabetes mellitus with foot ulcer, without long-term current use of insulin    3. Uncontrolled type 2 diabetes mellitus with hyperglycemia    4. Diabetic polyneuropathy associated with type 2 diabetes mellitus    5. Cellulitis of great toe of right foot    6. Medically noncompliant    7. Staph aureus infection    8. Group B streptococcal infection    9. Bipolar II disorder         Right hallux DFU: first clinic visit 5/19/22, neg xray: recalcitrant to healing complicated by his noncompliance and clinic absence; deterioration noted on 11/22/22 with toe cellulitis: rx bactrim and augmentin and then 2 weeks of Cipro prescribed on 12/13/2022: recalcitrant  Right foot MRI 10/5/22: no osteomyelitis  Prior left 2nd toe osteomyelitis with partial amputation by Dr CAMERON driscoll 2020, New ulcer left 2nd toe volar side: noted  6/9/22: closed    diabetes,uncontrolled:  hba1c 9.1 April 2022,  7.3 July 2022 up to 11.1 Dec 2022  Morbid obesity with bmi 54  Hypertension   Hyperlipidemia  H/o depression/bipolar  Medical noncompliance   Latest Reference Range & Units 12/30/22 09:20   Sed Rate 0 - 15 mm/hr 18 (H)   Prealbumin 18.0 - 45.0 mg/dL 27.5   CRP <5.00 mg/L 9.90 (H)   Hemoglobin A1C External <=7.0 % 11.1 (H)   Estimated Avg Glucose mg/dL 271.9   (H): Data is abnormally high  EXAMINATION:  XR TOE 2 OR MORE VIEWS RIGHT     CLINICAL HISTORY:  DFU right great toe/;     TECHNIQUE:  Three views of the right 1st toe     COMPARISON:  Radiographs 05/25/2022     FINDINGS:  Skin ulcer along the plantar aspect of the 1st toe.  No defined areas of osteolysis in this region.  No tracking subcutaneous gas.  Joint alignments are maintained.  No acute fracture.     Impression:     No acute osseous process appreciated.        Electronically signed by: Franko Valenzuela  Date:                                            12/30/2022  Time:                                           10:02    Patient brought me labs dated 12/09/2022  Cholesterol 271, triglycerides 587, , glucose 254, creatinine 0.98, liver enzymes normal, H/H 11/35    Plan:           Plan of Care:    Debrided the DFU  I am concerned this will not heal and will need amputation. Recent a1c over 11, elevated inflammatory markers  but normal xray; will monitor closely  Continue offloading .  I think he is too large for a rolling knee scooter.  He does not have one  Return to clinic 1 week  See Aaron Monday 1/9th

## 2023-01-06 NOTE — PROCEDURES
"Debridement    Date/Time: 1/6/2023 7:56 AM  Performed by: Soheila Vidal MD  Authorized by: Soheila Vidal MD   Associated wounds:        (Retired) Wound 11/22/22 1053 Diabetic Ulcer Right medial;plantar Toe, first #1  Time out: Immediately prior to procedure a "time out" was called to verify the correct patient, procedure, equipment, support staff and site/side marked as required.    Consent Done?:  Yes (Written)    Preparation: Patient was prepped and draped in usual sterile fashion    Local anesthetic:  Topical anesthetic    Wound Details:    Location:  Right foot    Location:  Right 1st Toe    Type of Debridement:  Excisional       Length (cm):  1.6       Area (sq cm):  1.44       Width (cm):  0.9       Percent Debrided (%):  100       Depth (cm):  0.4       Total Area Debrided (sq cm):  1.44    Depth of debridement:  Subcutaneous tissue    Tissue debrided:  Dermis, Epidermis and Subcutaneous    Devitalized tissue debrided:  Biofilm, Slough and Callus    Instruments:  Curette    Bleeding:  Minimal  Hemostasis Achieved: Yes    Method Used:  Pressure  Patient tolerance:  Patient tolerated the procedure well with no immediate complications  "

## 2023-01-06 NOTE — PATIENT INSTRUCTIONS
Pt seen today by:     Self care DRESSING INSTRUCTIONS:      Wound location: Right Great Toe     Dressings to be changed daily or every other day and as needed if soiled or not intact.  Cleanse wound with wound cleanser or saline  Cover with calcium alginate silver  May alternate wound care with use of therahoney gel  Cover with abd pad and secure with a shaka/cover roll tape  Wear Darco shoe    Return to clinic on Friday Jan. 13th, 2023 at 8am     Wound may have been debrided in clinic: if so, WHAT YOU NEED TO KNOW:    Debridement is the removal of infected, damaged, or dead tissue so a wound can heal properly. Your wound may need more than one debridement. Debridement can cause bleeding, and a small amount of blood is expected.  AFTER A DEBRIDEMENT:  Keep your wound clean and dry. Do not remove the dressing unless instructed.  Follow the wound care orders provided to you or your home health care provider.  If you have pain, take over the counter pain relievers or pain medication if prescribed.  Elevate the wound and limit excessive activity to prevent bleeding and/or swelling in your wound.  If you see blood coming through the dressing, apply gauze and tape over the dressing and hold firm pressure to the wound with your hand for 5-10 minutes continuously, without peeking, to help the bleeding stop.  Contact [facility] wound care team at [phone] or go to the nearest Emergency department if:  You have a fever greater than 101 taken by mouth.  Your pain gets worse or does not go away, even after taking your regular pain medicine.  Your skin around your wound is red, hot, swollen, or draining pus.  You have bleeding that continues to come through the dressing after holding pressure for 10 minutes   The current daily value (%DV) for protein is 50 grams per day and is meant as a general goal for most people. Further increasing your dietary protein intake is very important for wound healing. Typically one  needs over 100g of protein per day to help with wound healing needs.  If you are a dialysis patient or have problems with your kidneys, talk to your Nephrologist about how much protein you can take in with your condition.  Examples of high protein items that can be added to your diet include: eggs, chicken, red meats, almonds, cottage cheese, Greek yogurt, beans, and peanut butter.  Fortified protein bars, shakes and drinks can add 15-30 additional grams of protein per serving.   Also add:   1 daily general multivitamin   Sidney : 1 packet twice daily   Vitamin C : 500mg twice daily   Zinc 220 mg daily  Vit D : once daily    Call our [facility] wound clinic for questions/concerns a [phone] .

## 2023-01-13 ENCOUNTER — HOSPITAL ENCOUNTER (OUTPATIENT)
Dept: WOUND CARE | Facility: HOSPITAL | Age: 53
Discharge: HOME | End: 2023-01-13
Attending: EMERGENCY MEDICINE
Payer: MEDICARE

## 2023-01-13 VITALS
TEMPERATURE: 98 F | HEIGHT: 70 IN | BODY MASS INDEX: 45.1 KG/M2 | RESPIRATION RATE: 20 BRPM | SYSTOLIC BLOOD PRESSURE: 142 MMHG | DIASTOLIC BLOOD PRESSURE: 71 MMHG | WEIGHT: 315 LBS | HEART RATE: 84 BPM

## 2023-01-13 DIAGNOSIS — A49.1 GROUP B STREPTOCOCCAL INFECTION: ICD-10-CM

## 2023-01-13 DIAGNOSIS — A49.01 STAPH AUREUS INFECTION: ICD-10-CM

## 2023-01-13 DIAGNOSIS — Z91.199 MEDICALLY NONCOMPLIANT: ICD-10-CM

## 2023-01-13 DIAGNOSIS — L97.519 ULCER OF RIGHT GREAT TOE DUE TO DIABETES MELLITUS: Primary | ICD-10-CM

## 2023-01-13 DIAGNOSIS — E11.42 DIABETIC POLYNEUROPATHY ASSOCIATED WITH TYPE 2 DIABETES MELLITUS: ICD-10-CM

## 2023-01-13 DIAGNOSIS — E66.01 MORBID OBESITY: ICD-10-CM

## 2023-01-13 DIAGNOSIS — E11.621 ULCER OF RIGHT GREAT TOE DUE TO DIABETES MELLITUS: Primary | ICD-10-CM

## 2023-01-13 DIAGNOSIS — L97.509 TYPE 2 DIABETES MELLITUS WITH FOOT ULCER, WITHOUT LONG-TERM CURRENT USE OF INSULIN: ICD-10-CM

## 2023-01-13 DIAGNOSIS — E11.621 TYPE 2 DIABETES MELLITUS WITH FOOT ULCER, WITHOUT LONG-TERM CURRENT USE OF INSULIN: ICD-10-CM

## 2023-01-13 DIAGNOSIS — L03.031 CELLULITIS OF GREAT TOE OF RIGHT FOOT: ICD-10-CM

## 2023-01-13 DIAGNOSIS — F31.81 BIPOLAR II DISORDER: ICD-10-CM

## 2023-01-13 DIAGNOSIS — E11.65 UNCONTROLLED TYPE 2 DIABETES MELLITUS WITH HYPERGLYCEMIA: ICD-10-CM

## 2023-01-13 DIAGNOSIS — I10 ESSENTIAL HYPERTENSION, BENIGN: ICD-10-CM

## 2023-01-13 LAB — POCT GLUCOSE: 331 MG/DL (ref 70–110)

## 2023-01-13 PROCEDURE — 11042 DBRDMT SUBQ TIS 1ST 20SQCM/<: CPT

## 2023-01-13 PROCEDURE — 87070 CULTURE OTHR SPECIMN AEROBIC: CPT

## 2023-01-13 PROCEDURE — 27000999 HC MEDICAL RECORD PHOTO DOCUMENTATION

## 2023-01-13 NOTE — PROGRESS NOTES
Subjective:       Patient ID: Jong Clayton is a 52 y.o. male.    Chief Complaint: No chief complaint on file.      51 y/o  WM with morbid obesity, diabetes, neuropathy hypertension, dyslipidemia, chronic back pain, bipolar and depression who has battled various DFU's with osteomyelitis (left 2nd toe 2019 and again 2020). I have treated him in past for this left 2nd toe and noted then he was noncompliant with my recommendations. I last saw him for that left 2nd toe in March 2020 where he had a bulbous left 2nd hammer toe with +MRI for osteo. He never returned as scheduled but ultimately followed through with my prior recs of distal tip amputation.    He returned to clinic in mid May 2022 complaining of a chronic untreated right plantar great toe ulcer which began in Dec 2021 that he tried to self treat. His wife finally got him to agree to come to clinic but unfortunately he continues to demonstrate noncompliance and more often than not misses visits here, rescheduled and then misses more.  Is always extremely hyperglycemic and is super morbidly obese.  It has been very difficult to treat because of that as well as what insurance would allow us to do.  (For example they refused my request for IV  and IM antibiotics  fall of 2022).  Back in the summer I ordered an MRI of this right big toe which took him months to get done (but finally had it in October and it did not demonstrate osteomyelitis).  Patient came into the end of November after a greater than 1 month absence with a significant decline in the right toe wound with evidence of bruising . I prescribed 2 antibiotics: bactrim and augmentin. Cellulitis and periwound blistering resolved but left with a larger DFU that is not getting better. I ordered cipro for 2 weeks.  He tells me he had abnormal cholesterol panel on recent labs but they did not do a hemoglobin A1c. Last I ordered that +an xray; xray fine but a1c over 11.  He is at high-risk for needing this  toe amputated and I have told him this. He admits to bad diet and 'eating his feelings'. He saw Walker a few days ago and to go on an 18oo lula/day diet.       Review of Systems   Constitutional:  Positive for fatigue. Negative for chills and fever.   HENT: Negative.     Respiratory: Negative.     Cardiovascular: Negative.    Gastrointestinal: Negative.    Musculoskeletal:  Positive for back pain.   Skin:  Positive for wound (see hpi).   Neurological:  Positive for numbness (to both feet). Negative for tremors, seizures, syncope and speech difficulty.       Objective:       Vitals:    01/13/23 0758   BP: (!) 142/71   Pulse: 84   Resp: 20   Temp: 98.3 °F (36.8 °C)       Recent Labs   Lab 01/13/23 0758   POCTGLUCOSE 331*        Physical Exam  Constitutional:       Appearance: He is morbidly obese.   HENT:      Head: Normocephalic and atraumatic.   Eyes:      Conjunctiva/sclera: Conjunctivae normal.   Cardiovascular:      Pulses: Normal pulses.           Dorsalis pedis pulses are 2+ on the right side and 2+ on the left side.      Comments: +hair on legs  Musculoskeletal:      Right lower leg: No edema.      Left lower leg: No edema.        Feet:    Feet:      Comments: Hallux valgus deformity noted  Skin:     Capillary Refill: Capillary refill takes less than 2 seconds.   Neurological:      General: No focal deficit present.      Mental Status: He is alert and oriented to person, place, and time. Mental status is at baseline.            (Retired) Wound 11/22/22 1053 Diabetic Ulcer Right medial;plantar Toe, first #1 (Active)   11/22/22 1053    Pre-existing: Yes   Primary Wound Type: Diabetic ulc   Side: Right   Orientation: medial;plantar   Location: Toe, first   Wound Number: #1   Ankle-Brachial Index: paul done 12/20/22 = right dp = 1.19, pt = 1.54; Left dp = 1.54, pt= 1.03   Pulses: + palpable/+ doppler, right dp/pt = bi phasic, left dp/pt= biphasic   Removal Indication and Assessment:    Wound Outcome:    (Retired)  Wound Type:    (Retired) Wound Length (cm):    (Retired) Wound Width (cm):    (Retired) Depth (cm):    Wound Description (Comments):    Removal Indications:    Wound Image    01/13/23 0806   Dressing Appearance Intact;Moist drainage 01/13/23 0806   Drainage Amount Moderate 01/13/23 0806   Drainage Characteristics/Odor Serosanguineous;Yellow 01/13/23 0806   Appearance Red 01/13/23 0806   Black (%), Wound Tissue Color 0 % 01/13/23 0806   Red (%), Wound Tissue Color 100 % 01/13/23 0806   Yellow (%), Wound Tissue Color 0 % 01/13/23 0806   Periwound Area Intact 01/13/23 0806   Wound Edges Callused 01/13/23 0806   Wound Length (cm) 2.1 cm 01/13/23 0806   Wound Width (cm) 1.4 cm 01/13/23 0806   Wound Depth (cm) 0.4 cm 01/13/23 0806   Wound Volume (cm^3) 1.176 cm^3 01/13/23 0806   Wound Surface Area (cm^2) 2.94 cm^2 01/13/23 0806   Undermining (depth (cm)/location) 0.9cm from 6-11 with deepest at 9 o'clock 01/13/23 0806   Care Cleansed with:;Soap and water;Applied: 01/13/23 0806   Dressing Applied;Calcium alginate;Silver;Absorptive Pad;Rolled gauze 01/13/23 0806   Periwound Care Skin barrier film applied 01/13/23 0806               Assessment:       1. Ulcer of right great toe due to diabetes mellitus    2. Type 2 diabetes mellitus with foot ulcer, without long-term current use of insulin    3. Uncontrolled type 2 diabetes mellitus with hyperglycemia    4. Diabetic polyneuropathy associated with type 2 diabetes mellitus    5. Cellulitis of great toe of right foot    6. Medically noncompliant    7. Staph aureus infection    8. Group B streptococcal infection    9. Bipolar II disorder    10. Morbid obesity    11. Essential hypertension, benign         Right hallux DFU: first clinic visit 5/19/22, neg xray: recalcitrant to healing complicated by his noncompliance and clinic absence; deterioration noted on 11/22/22 with toe cellulitis: rx bactrim and augmentin and then 2 weeks of Cipro prescribed on 12/13/2022:  recalcitrant  Right foot MRI 10/5/22: no osteomyelitis  Prior left 2nd toe osteomyelitis with partial amputation by Dr CAMERON driscoll 2020, New ulcer left 2nd toe volar side: noted 6/9/22: closed    diabetes,uncontrolled:  hba1c 9.1 April 2022,  7.3 July 2022 up to 11.1 Dec 2022  Morbid obesity with bmi 54  Hypertension   Hyperlipidemia  H/o depression/bipolar  Medical noncompliance   Latest Reference Range & Units 12/30/22 09:20   Sed Rate 0 - 15 mm/hr 18 (H)   Prealbumin 18.0 - 45.0 mg/dL 27.5   CRP <5.00 mg/L 9.90 (H)   Hemoglobin A1C External <=7.0 % 11.1 (H)   Estimated Avg Glucose mg/dL 271.9   (H): Data is abnormally high  EXAMINATION:  XR TOE 2 OR MORE VIEWS RIGHT     CLINICAL HISTORY:  DFU right great toe/;     TECHNIQUE:  Three views of the right 1st toe     COMPARISON:  Radiographs 05/25/2022     FINDINGS:  Skin ulcer along the plantar aspect of the 1st toe.  No defined areas of osteolysis in this region.  No tracking subcutaneous gas.  Joint alignments are maintained.  No acute fracture.     Impression:     No acute osseous process appreciated.        Electronically signed by: Franko Driscoll  Date:                                            12/30/2022  Time:                                           10:02    Patient brought me labs dated 12/09/2022  Cholesterol 271, triglycerides 587, , glucose 254, creatinine 0.98, liver enzymes normal, H/H 11/35    Plan:           Plan of Care:    Debrided the DFU: removed the lip of undermining  Cx sent: plan IV antibiotics this time: I tried in fall of 2022 but his insurance would not paybut his has new or supplemental insurance as of Jan 1   am concerned this will not heal and will need amputation. Recent a1c over 11, elevated inflammatory markers  but normal xray; will monitor closely  Continue offloading .  I think he is too large for a rolling knee scooter.  He does not have one  Return to clinic 1 week

## 2023-01-13 NOTE — PROCEDURES
Debridement    Date/Time: 1/13/2023 7:48 AM  Performed by: Soheila Vidal MD  Authorized by: Soheila Vidal MD   Associated wounds:        (Retired) Wound 11/22/22 1053 Diabetic Ulcer Right medial;plantar Toe, first #1  Consent Done?:  Yes (Written)    Preparation: Patient was prepped and draped in usual sterile fashion    Local anesthesia used?: Yes    Local anesthetic:  Topical anesthetic    Wound Details:    Location:  Right foot    Location:  Right 1st Toe    Type of Debridement:  Excisional       Length (cm):  2.1       Area (sq cm):  2.94       Width (cm):  1.4       Percent Debrided (%):  100       Depth (cm):  0.4       Total Area Debrided (sq cm):  2.94    Depth of debridement:  Subcutaneous tissue    Tissue debrided:  Dermis, Epidermis and Subcutaneous    Devitalized tissue debrided:  Biofilm and Slough    Instruments:  Curette    Bleeding:  Moderate  Hemostasis Achieved: Yes    Method Used:  Silver Nitrate and Pressure  Patient tolerance:  Patient tolerated the procedure well with no immediate complications     I removed/excised the lip of undermining and also sent a tissue cx

## 2023-01-13 NOTE — PATIENT INSTRUCTIONS
Pt seen today by:     Self care DRESSING INSTRUCTIONS:      Wound location: Right Great Toe     Tissue culture done (1/13/23)    Dressings to be changed daily or every other day and as needed if soiled or not intact.  Cleanse wound with wound cleanser or saline  Cover with calcium alginate silver  May alternate wound care with use of therahoney gel  Cover with abd pad and secure with a shaka/cover roll tape  Wear Darco shoe    Return to clinic on Friday Jan. 20th, 2023 at 8am     Wound may have been debrided in clinic: if so, WHAT YOU NEED TO KNOW:    Debridement is the removal of infected, damaged, or dead tissue so a wound can heal properly. Your wound may need more than one debridement. Debridement can cause bleeding, and a small amount of blood is expected.  AFTER A DEBRIDEMENT:  Keep your wound clean and dry. Do not remove the dressing unless instructed.  Follow the wound care orders provided to you or your home health care provider.  If you have pain, take over the counter pain relievers or pain medication if prescribed.  Elevate the wound and limit excessive activity to prevent bleeding and/or swelling in your wound.  If you see blood coming through the dressing, apply gauze and tape over the dressing and hold firm pressure to the wound with your hand for 5-10 minutes continuously, without peeking, to help the bleeding stop.  Contact [facility] wound care team at [phone] or go to the nearest Emergency department if:  You have a fever greater than 101 taken by mouth.  Your pain gets worse or does not go away, even after taking your regular pain medicine.  Your skin around your wound is red, hot, swollen, or draining pus.  You have bleeding that continues to come through the dressing after holding pressure for 10 minutes   The current daily value (%DV) for protein is 50 grams per day and is meant as a general goal for most people. Further increasing your dietary protein intake is very important for  wound healing. Typically one needs over 100g of protein per day to help with wound healing needs.  If you are a dialysis patient or have problems with your kidneys, talk to your Nephrologist about how much protein you can take in with your condition.  Examples of high protein items that can be added to your diet include: eggs, chicken, red meats, almonds, cottage cheese, Greek yogurt, beans, and peanut butter.  Fortified protein bars, shakes and drinks can add 15-30 additional grams of protein per serving.   Also add:   1 daily general multivitamin   Sidney : 1 packet twice daily   Vitamin C : 500mg twice daily   Zinc 220 mg daily  Vit D : once daily    Call our [facility] wound clinic for questions/concerns a [phone] .

## 2023-01-17 LAB — BACTERIA SPEC CULT: ABNORMAL

## 2023-01-17 RX ORDER — AMOXICILLIN AND CLAVULANATE POTASSIUM 875; 125 MG/1; MG/1
1 TABLET, FILM COATED ORAL EVERY 12 HOURS
Qty: 28 TABLET | Refills: 0 | Status: SHIPPED | OUTPATIENT
Start: 2023-01-17 | End: 2023-01-31

## 2023-01-20 ENCOUNTER — HOSPITAL ENCOUNTER (OUTPATIENT)
Dept: WOUND CARE | Facility: HOSPITAL | Age: 53
Discharge: HOME | End: 2023-01-20
Attending: EMERGENCY MEDICINE
Payer: MEDICARE

## 2023-01-20 VITALS
HEART RATE: 72 BPM | DIASTOLIC BLOOD PRESSURE: 84 MMHG | TEMPERATURE: 98 F | WEIGHT: 315 LBS | BODY MASS INDEX: 45.1 KG/M2 | SYSTOLIC BLOOD PRESSURE: 129 MMHG | RESPIRATION RATE: 20 BRPM | HEIGHT: 70 IN

## 2023-01-20 DIAGNOSIS — E11.42 DIABETIC POLYNEUROPATHY ASSOCIATED WITH TYPE 2 DIABETES MELLITUS: ICD-10-CM

## 2023-01-20 DIAGNOSIS — L03.031 CELLULITIS OF GREAT TOE OF RIGHT FOOT: ICD-10-CM

## 2023-01-20 DIAGNOSIS — A49.8 ENTEROCOCCUS FAECALIS INFECTION: ICD-10-CM

## 2023-01-20 DIAGNOSIS — L97.509 TYPE 2 DIABETES MELLITUS WITH FOOT ULCER, WITHOUT LONG-TERM CURRENT USE OF INSULIN: ICD-10-CM

## 2023-01-20 DIAGNOSIS — E66.01 MORBID OBESITY: ICD-10-CM

## 2023-01-20 DIAGNOSIS — F31.81 BIPOLAR II DISORDER: ICD-10-CM

## 2023-01-20 DIAGNOSIS — A49.01 STAPH AUREUS INFECTION: ICD-10-CM

## 2023-01-20 DIAGNOSIS — I10 ESSENTIAL HYPERTENSION, BENIGN: ICD-10-CM

## 2023-01-20 DIAGNOSIS — E11.65 UNCONTROLLED TYPE 2 DIABETES MELLITUS WITH HYPERGLYCEMIA: ICD-10-CM

## 2023-01-20 DIAGNOSIS — L97.519 ULCER OF RIGHT GREAT TOE DUE TO DIABETES MELLITUS: Primary | ICD-10-CM

## 2023-01-20 DIAGNOSIS — E11.621 ULCER OF RIGHT GREAT TOE DUE TO DIABETES MELLITUS: Primary | ICD-10-CM

## 2023-01-20 DIAGNOSIS — A49.1 GROUP B STREPTOCOCCAL INFECTION: ICD-10-CM

## 2023-01-20 DIAGNOSIS — E11.621 TYPE 2 DIABETES MELLITUS WITH FOOT ULCER, WITHOUT LONG-TERM CURRENT USE OF INSULIN: ICD-10-CM

## 2023-01-20 DIAGNOSIS — Z91.199 MEDICALLY NONCOMPLIANT: ICD-10-CM

## 2023-01-20 LAB — POCT GLUCOSE: 307 MG/DL (ref 70–110)

## 2023-01-20 PROCEDURE — 11042 DBRDMT SUBQ TIS 1ST 20SQCM/<: CPT

## 2023-01-20 PROCEDURE — 27000999 HC MEDICAL RECORD PHOTO DOCUMENTATION

## 2023-01-20 NOTE — PATIENT INSTRUCTIONS
Pt seen today by:     Self care DRESSING INSTRUCTIONS:      Wound location: Right Great Toe     Continue antibiotics as directed      Dressings to be changed daily or every other day and as needed if soiled or not intact.  Cleanse wound with wound cleanser or saline  Cover with calcium alginate silver  May alternate wound care with use of therahoney gel  Cover with abd pad and secure with a shaka/cover roll tape  Wear Darco shoe    Return to clinic on Friday Jan. 27th, 2023 at 8am     Wound may have been debrided in clinic: if so, WHAT YOU NEED TO KNOW:    Debridement is the removal of infected, damaged, or dead tissue so a wound can heal properly. Your wound may need more than one debridement. Debridement can cause bleeding, and a small amount of blood is expected.  AFTER A DEBRIDEMENT:  Keep your wound clean and dry. Do not remove the dressing unless instructed.  Follow the wound care orders provided to you or your home health care provider.  If you have pain, take over the counter pain relievers or pain medication if prescribed.  Elevate the wound and limit excessive activity to prevent bleeding and/or swelling in your wound.  If you see blood coming through the dressing, apply gauze and tape over the dressing and hold firm pressure to the wound with your hand for 5-10 minutes continuously, without peeking, to help the bleeding stop.  Contact [facility] wound care team at [phone] or go to the nearest Emergency department if:  You have a fever greater than 101 taken by mouth.  Your pain gets worse or does not go away, even after taking your regular pain medicine.  Your skin around your wound is red, hot, swollen, or draining pus.  You have bleeding that continues to come through the dressing after holding pressure for 10 minutes   The current daily value (%DV) for protein is 50 grams per day and is meant as a general goal for most people. Further increasing your dietary protein intake is very important  for wound healing. Typically one needs over 100g of protein per day to help with wound healing needs.  If you are a dialysis patient or have problems with your kidneys, talk to your Nephrologist about how much protein you can take in with your condition.  Examples of high protein items that can be added to your diet include: eggs, chicken, red meats, almonds, cottage cheese, Greek yogurt, beans, and peanut butter.  Fortified protein bars, shakes and drinks can add 15-30 additional grams of protein per serving.   Also add:   1 daily general multivitamin   Sidney : 1 packet twice daily   Vitamin C : 500mg twice daily   Zinc 220 mg daily  Vit D : once daily    Call our [facility] wound clinic for questions/concerns a [phone] .

## 2023-01-20 NOTE — PROGRESS NOTES
Subjective:       Patient ID: Jong Clayton is a 52 y.o. male.    Chief Complaint: No chief complaint on file.      51 y/o  WM with morbid obesity, diabetes, neuropathy hypertension, dyslipidemia, chronic back pain, bipolar and depression who has battled various DFU's with osteomyelitis (left 2nd toe 2019 and again 2020). I have treated him in past for this left 2nd toe and noted then he was noncompliant with my recommendations. I last saw him for that left 2nd toe in March 2020 where he had a bulbous left 2nd hammer toe with +MRI for osteo. He never returned as scheduled but ultimately followed through with my prior recs of distal tip amputation.    He returned to clinic in mid May 2022 complaining of a chronic untreated right plantar great toe ulcer which began in Dec 2021 that he tried to self treat. His wife finally got him to agree to come to clinic but unfortunately he continues to demonstrate noncompliance along with ongoing uncontrolled diabetes with self admitted poor diet . In addition, we are limited in our treatment options because of his insurance:   For example they refused my request for IV and/or IM antibiotics in fall of 2022).  Back in the summer I ordered an MRI of this right big toe which took him months to get done (but finally had it in October and it did not demonstrate osteomyelitis).  Patient came into the end of November after a greater than 1 month absence with a significant decline in the right toe wound with evidence of bruising . I prescribed 2 antibiotics: bactrim and augmentin. Cellulitis and periwound blistering resolved but left with a larger DFU that is not getting better. I placed on more antibiotics :  most recently augmentin for +Enterococcus cx.most recent  a1c over 11.  He is at high-risk for needing this toe amputated and I have told him this.   Trying to get IV antibiotics again: working with what his insurance will pay      Review of Systems   Constitutional:  Positive for  fatigue. Negative for chills and fever.   HENT: Negative.     Respiratory: Negative.     Cardiovascular: Negative.    Gastrointestinal: Negative.    Musculoskeletal:  Positive for back pain.   Skin:  Positive for wound (see hpi).   Neurological:  Positive for numbness (to both feet). Negative for tremors, seizures, syncope and speech difficulty.       Objective:       Vitals:    01/20/23 0810   BP: 129/84   Pulse: 72   Resp: 20   Temp: 97.8 °F (36.6 °C)       Recent Labs   Lab 01/20/23  0818   POCTGLUCOSE 307*        Physical Exam  Constitutional:       Appearance: He is morbidly obese.   HENT:      Head: Normocephalic and atraumatic.   Eyes:      Conjunctiva/sclera: Conjunctivae normal.   Cardiovascular:      Pulses: Normal pulses.           Dorsalis pedis pulses are 2+ on the right side and 2+ on the left side.      Comments: +hair on legs  Musculoskeletal:      Right lower leg: No edema.      Left lower leg: No edema.        Feet:    Feet:      Comments: Hallux valgus deformity noted  Skin:     Capillary Refill: Capillary refill takes less than 2 seconds.   Neurological:      General: No focal deficit present.      Mental Status: He is alert and oriented to person, place, and time. Mental status is at baseline.            (Retired) Wound 11/22/22 1053 Diabetic Ulcer Right medial;plantar Toe, first #1 (Active)   11/22/22 1053    Pre-existing: Yes   Primary Wound Type: Diabetic ulc   Side: Right   Orientation: medial;plantar   Location: Toe, first   Wound Number: #1   Ankle-Brachial Index: paul done 1/20/2023 = right dp = 1.01, pt = .99   Pulses: + palpable x 4, + doppler, right dp monophasic   pt = bi phasic,   left dp/pt= biphasic   Removal Indication and Assessment:    Wound Outcome:    (Retired) Wound Type:    (Retired) Wound Length (cm):    (Retired) Wound Width (cm):    (Retired) Depth (cm):    Wound Description (Comments):    Removal Indications:    Wound Image   01/20/23 0836   Dressing Appearance  Intact;Moist drainage 01/20/23 0836   Drainage Amount Moderate 01/20/23 0836   Drainage Characteristics/Odor Serosanguineous;Yellow 01/20/23 0836   Appearance Pink;Red;Epithelialization;Moist 01/20/23 0836   Black (%), Wound Tissue Color 0 % 01/20/23 0836   Red (%), Wound Tissue Color 100 % 01/20/23 0836   Yellow (%), Wound Tissue Color 0 % 01/20/23 0836   Periwound Area Macerated;Intact;Edematous 01/20/23 0836   Wound Edges Defined 01/20/23 0836   Wound Length (cm) 1.8 cm 01/20/23 0836   Wound Width (cm) 1.5 cm 01/20/23 0836   Wound Depth (cm) 0.3 cm 01/20/23 0836   Wound Volume (cm^3) 0.81 cm^3 01/20/23 0836   Wound Surface Area (cm^2) 2.7 cm^2 01/20/23 0836   Care Cleansed with:;Wound cleanser;Applied: 01/20/23 0836   Dressing Applied;Calcium alginate;Silver;Absorptive Pad;Rolled gauze 01/20/23 0836   Periwound Care Moisture barrier applied 01/20/23 0836               Assessment:       1. Ulcer of right great toe due to diabetes mellitus    2. Type 2 diabetes mellitus with foot ulcer, without long-term current use of insulin    3. Uncontrolled type 2 diabetes mellitus with hyperglycemia    4. Diabetic polyneuropathy associated with type 2 diabetes mellitus    5. Cellulitis of great toe of right foot    6. Medically noncompliant    7. Staph aureus infection    8. Group B streptococcal infection    9. Enterococcus faecalis infection    10. Bipolar II disorder    11. Morbid obesity    12. Essential hypertension, benign         Right hallux DFU: first clinic visit 5/19/22, neg xray: recalcitrant to healing complicated by his noncompliance and clinic absence; deterioration noted on 11/22/22 with toe cellulitis: rx bactrim and augmentin and then 2 weeks of Cipro prescribed on 12/13/2022: recalcitrant  Right foot MRI 10/5/22: no osteomyelitis  Prior left 2nd toe osteomyelitis with partial amputation by Dr CAMERON driscoll 2020, New ulcer left 2nd toe volar side: noted 6/9/22: closed    diabetes,uncontrolled:  hba1c 9.1 April  2022,  7.3 July 2022 up to 11.1 Dec 2022  Morbid obesity with bmi 54  Hypertension   Hyperlipidemia  H/o depression/bipolar  Medical noncompliance   Latest Reference Range & Units 12/30/22 09:20   Sed Rate 0 - 15 mm/hr 18 (H)   Prealbumin 18.0 - 45.0 mg/dL 27.5   CRP <5.00 mg/L 9.90 (H)   Hemoglobin A1C External <=7.0 % 11.1 (H)   Estimated Avg Glucose mg/dL 271.9   (H): Data is abnormally high  EXAMINATION:  XR TOE 2 OR MORE VIEWS RIGHT     CLINICAL HISTORY:  DFU right great toe/;     TECHNIQUE:  Three views of the right 1st toe     COMPARISON:  Radiographs 05/25/2022     FINDINGS:  Skin ulcer along the plantar aspect of the 1st toe.  No defined areas of osteolysis in this region.  No tracking subcutaneous gas.  Joint alignments are maintained.  No acute fracture.     Impression:     No acute osseous process appreciated.        Electronically signed by: Franko Valenzuela  Date:                                            12/30/2022  Time:                                           10:02    Patient brought me labs dated 12/09/2022  Cholesterol 271, triglycerides 587, , glucose 254, creatinine 0.98, liver enzymes normal, H/H 11/35    Cx 1/13/23  Aerobic Culture - Tissue Few Enterococcus faecalis Abnormal     with normal skin corey   Ampicillin results may be used to predict susceptibility to amoxicillin-clavulanate, ampicillin-sulbactam, and piperacillin-tazobactam.        Resulting Agency: [facility]     Susceptibility     Enterococcus faecalis     Not Specified     Ampicillin <=2  Sensitive     Gentamicin Synergy Screen SYN-R  Resistant     Penicillin 2  Sensitive     Streptomycin Synergy SYN-S  Sensitive                       Plan:           Plan of Care:    Debrided the DFU  Cx sent: plan IV antibiotics this time: I tried in fall of 2022 but his insurance would not paybut his has new or supplemental insurance as of Jan 1; placed him on augmentin for now: he just got it yesterday on 1/29/23  I am concerned this  will not heal and will need amputation. Recent a1c over 11, elevated inflammatory markers  but normal xray; will monitor closely  Continue offloading .  I think he is too large for a rolling knee scooter.  He does not have one  Return to clinic 1 week

## 2023-01-20 NOTE — PROCEDURES
"Debridement    Date/Time: 1/20/2023 8:30 AM  Performed by: Soheila Vidal MD  Authorized by: Soheila Vidal MD   Associated wounds:        (Retired) Wound 11/22/22 1053 Diabetic Ulcer Right medial;plantar Toe, first #1  Time out: Immediately prior to procedure a "time out" was called to verify the correct patient, procedure, equipment, support staff and site/side marked as required.    Consent Done?:  Yes (Written)    Preparation: Patient was prepped and draped in usual sterile fashion    Local anesthesia used?: Yes    Local anesthetic:  Topical anesthetic    Wound Details:    Location:  Right foot    Location:  Right 1st Toe    Type of Debridement:  Excisional       Length (cm):  1.8       Area (sq cm):  2.7       Width (cm):  1.5       Percent Debrided (%):  100       Depth (cm):  0.3       Total Area Debrided (sq cm):  2.7    Depth of debridement:  Subcutaneous tissue    Tissue debrided:  Dermis, Epidermis and Subcutaneous    Devitalized tissue debrided:  Biofilm and Slough    Instruments:  Curette    Bleeding:  Moderate  Hemostasis Achieved: Yes    Method Used:  Pressure and Silver Nitrate  Patient tolerance:  Patient tolerated the procedure well with no immediate complications  "

## 2023-01-27 ENCOUNTER — HOSPITAL ENCOUNTER (OUTPATIENT)
Dept: WOUND CARE | Facility: HOSPITAL | Age: 53
Discharge: HOME | End: 2023-01-27
Attending: EMERGENCY MEDICINE
Payer: MEDICARE

## 2023-01-27 VITALS
WEIGHT: 315 LBS | SYSTOLIC BLOOD PRESSURE: 148 MMHG | TEMPERATURE: 98 F | BODY MASS INDEX: 45.1 KG/M2 | DIASTOLIC BLOOD PRESSURE: 82 MMHG | HEART RATE: 75 BPM | RESPIRATION RATE: 20 BRPM | HEIGHT: 70 IN

## 2023-01-27 DIAGNOSIS — A49.01 STAPH AUREUS INFECTION: ICD-10-CM

## 2023-01-27 DIAGNOSIS — F31.81 BIPOLAR II DISORDER: ICD-10-CM

## 2023-01-27 DIAGNOSIS — L97.519 ULCER OF RIGHT GREAT TOE DUE TO DIABETES MELLITUS: Primary | ICD-10-CM

## 2023-01-27 DIAGNOSIS — E11.621 ULCER OF RIGHT GREAT TOE DUE TO DIABETES MELLITUS: Primary | ICD-10-CM

## 2023-01-27 DIAGNOSIS — A49.8 ENTEROCOCCUS FAECALIS INFECTION: ICD-10-CM

## 2023-01-27 DIAGNOSIS — Z91.199 MEDICALLY NONCOMPLIANT: ICD-10-CM

## 2023-01-27 DIAGNOSIS — L03.031 CELLULITIS OF GREAT TOE OF RIGHT FOOT: ICD-10-CM

## 2023-01-27 DIAGNOSIS — E66.01 MORBID OBESITY: ICD-10-CM

## 2023-01-27 DIAGNOSIS — L97.509 TYPE 2 DIABETES MELLITUS WITH FOOT ULCER, WITHOUT LONG-TERM CURRENT USE OF INSULIN: ICD-10-CM

## 2023-01-27 DIAGNOSIS — E11.621 TYPE 2 DIABETES MELLITUS WITH FOOT ULCER, WITHOUT LONG-TERM CURRENT USE OF INSULIN: ICD-10-CM

## 2023-01-27 DIAGNOSIS — E11.42 DIABETIC POLYNEUROPATHY ASSOCIATED WITH TYPE 2 DIABETES MELLITUS: ICD-10-CM

## 2023-01-27 DIAGNOSIS — E11.65 UNCONTROLLED TYPE 2 DIABETES MELLITUS WITH HYPERGLYCEMIA: ICD-10-CM

## 2023-01-27 LAB — POCT GLUCOSE: 322 MG/DL (ref 70–110)

## 2023-01-27 PROCEDURE — 27000999 HC MEDICAL RECORD PHOTO DOCUMENTATION

## 2023-01-27 PROCEDURE — 11042 DBRDMT SUBQ TIS 1ST 20SQCM/<: CPT

## 2023-01-27 NOTE — PATIENT INSTRUCTIONS
Pt seen today by:     Self care DRESSING INSTRUCTIONS:      Wound location: Right Great Toe     Continue antibiotics as directed until complete      Dressings to be changed daily or every other day and as needed if soiled or not intact.  Cleanse wound with wound cleanser or saline  Cover with calcium alginate silver  May alternate wound care with use of therahoney gel  Cover with abd pad and secure with a shaka/cover roll tape  Wear Darco shoe    Return to clinic on Friday Feb. 3, 2023 at 8am     Wound may have been debrided in clinic: if so, WHAT YOU NEED TO KNOW:    Debridement is the removal of infected, damaged, or dead tissue so a wound can heal properly. Your wound may need more than one debridement. Debridement can cause bleeding, and a small amount of blood is expected.  AFTER A DEBRIDEMENT:  Keep your wound clean and dry. Do not remove the dressing unless instructed.  Follow the wound care orders provided to you or your home health care provider.  If you have pain, take over the counter pain relievers or pain medication if prescribed.  Elevate the wound and limit excessive activity to prevent bleeding and/or swelling in your wound.  If you see blood coming through the dressing, apply gauze and tape over the dressing and hold firm pressure to the wound with your hand for 5-10 minutes continuously, without peeking, to help the bleeding stop.  Contact [facility] wound care team at [phone] or go to the nearest Emergency department if:  You have a fever greater than 101 taken by mouth.  Your pain gets worse or does not go away, even after taking your regular pain medicine.  Your skin around your wound is red, hot, swollen, or draining pus.  You have bleeding that continues to come through the dressing after holding pressure for 10 minutes   The current daily value (%DV) for protein is 50 grams per day and is meant as a general goal for most people. Further increasing your dietary protein intake is  very important for wound healing. Typically one needs over 100g of protein per day to help with wound healing needs.  If you are a dialysis patient or have problems with your kidneys, talk to your Nephrologist about how much protein you can take in with your condition.  Examples of high protein items that can be added to your diet include: eggs, chicken, red meats, almonds, cottage cheese, Greek yogurt, beans, and peanut butter.  Fortified protein bars, shakes and drinks can add 15-30 additional grams of protein per serving.   Also add:   1 daily general multivitamin   Sidney : 1 packet twice daily   Vitamin C : 500mg twice daily   Zinc 220 mg daily  Vit D : once daily    Call our [facility] wound clinic for questions/concerns a [phone] .

## 2023-01-27 NOTE — PROCEDURES
"Debridement    Date/Time: 1/27/2023 7:31 AM  Performed by: Soheila Vidal MD  Authorized by: Soheila Vidal MD   Associated wounds:        (Retired) Wound 11/22/22 1053 Diabetic Ulcer Right medial;plantar Toe, first #1  Time out: Immediately prior to procedure a "time out" was called to verify the correct patient, procedure, equipment, support staff and site/side marked as required.    Consent Done?:  Yes (Written)    Preparation: Patient was prepped and draped in usual sterile fashion    Local anesthesia used?: Yes    Local anesthetic:  Topical anesthetic    Wound Details:    Location:  Right foot    Location:  Right 1st Toe    Type of Debridement:  Excisional       Length (cm):  1.7       Area (sq cm):  2.38       Width (cm):  1.4       Percent Debrided (%):  100       Depth (cm):  0.4       Total Area Debrided (sq cm):  2.38    Depth of debridement:  Subcutaneous tissue    Tissue debrided:  Dermis, Epidermis and Subcutaneous    Devitalized tissue debrided:  Biofilm and Slough    Instruments:  Curette    Bleeding:  Moderate  Hemostasis Achieved: Yes    Method Used:  Silver Nitrate and Pressure  Patient tolerance:  Patient tolerated the procedure well with no immediate complications  "

## 2023-01-27 NOTE — PROGRESS NOTES
Subjective:       Patient ID: Jong Clayton is a 52 y.o. male.    Chief Complaint: No chief complaint on file.      51 y/o  WM with morbid obesity, diabetes, neuropathy hypertension, dyslipidemia, chronic back pain, bipolar and depression who has battled various DFU's with osteomyelitis (left 2nd toe 2019 and again 2020). I have treated him in past for this left 2nd toe and noted then he was noncompliant with my recommendations. I last saw him for that left 2nd toe in March 2020 where he had a bulbous left 2nd hammer toe with +MRI for osteo. He never returned as scheduled but ultimately followed through with my prior recs of distal tip amputation.    He returned to clinic in mid May 2022 complaining of a chronic untreated right plantar great toe ulcer which began in Dec 2021 that he tried to self treat. His wife finally got him to agree to come to clinic but unfortunately he continues to demonstrate noncompliance along with ongoing uncontrolled diabetes with self admitted poor diet . In addition, we are limited in our treatment options because of his insurance:   For example they refused my request for IV and/or IM antibiotics in fall of 2022).  Back in the summer I ordered an MRI of this right big toe which took him months to get done (but finally had it in October and it did not demonstrate osteomyelitis).  Patient came into clinic at  the end of November after a >1month absence with a significant decline in the right toe wound with evidence of bruising . I prescribed 2 antibiotics: bactrim and augmentin. Cellulitis and periwound blistering resolved but left with a larger DFU that is not getting better. I placed on more antibiotics :  most recently augmentin for +Enterococcus cx. His most recent  HBa1c over 11.  He is at high-risk for needing this toe amputated.  Trying to get IV antibiotics again: working with what his insurance will pay but not having luck. I did prescribe augmentin based on last cx of few  Enterococcus on 1/13/23      Review of Systems   Constitutional:  Positive for fatigue. Negative for chills and fever.   HENT: Negative.     Respiratory: Negative.     Cardiovascular: Negative.    Gastrointestinal: Negative.    Musculoskeletal:  Positive for back pain.   Skin:  Positive for wound (see hpi).   Neurological:  Positive for numbness (to both feet). Negative for tremors, seizures, syncope and speech difficulty.       Objective:       Vitals:    01/27/23 0807   BP: (!) 148/82   Pulse: 75   Resp: 20   Temp: 97.8 °F (36.6 °C)       Recent Labs   Lab 01/27/23  0757   POCTGLUCOSE 322*        Physical Exam  Constitutional:       Appearance: He is morbidly obese.   HENT:      Head: Normocephalic and atraumatic.   Eyes:      Conjunctiva/sclera: Conjunctivae normal.   Cardiovascular:      Pulses: Normal pulses.           Dorsalis pedis pulses are 2+ on the right side and 2+ on the left side.      Comments: +hair on legs  Musculoskeletal:      Right lower leg: No edema.      Left lower leg: No edema.        Feet:    Feet:      Comments: Hallux valgus deformity noted  Skin:     Capillary Refill: Capillary refill takes less than 2 seconds.   Neurological:      General: No focal deficit present.      Mental Status: He is alert and oriented to person, place, and time. Mental status is at baseline.            (Retired) Wound 11/22/22 1053 Diabetic Ulcer Right medial;plantar Toe, first #1 (Active)   11/22/22 1053    Pre-existing: Yes   Primary Wound Type: Diabetic ulc   Side: Right   Orientation: medial;plantar   Location: Toe, first   Wound Number: #1   Ankle-Brachial Index: paul done 1/20/2023 = right dp = 1.01, pt = .99   Pulses: + palpable x 4, + doppler, right  tri phasic x2,   left dp/pt= biphasic   Removal Indication and Assessment:    Wound Outcome:    (Retired) Wound Type:    (Retired) Wound Length (cm):    (Retired) Wound Width (cm):    (Retired) Depth (cm):    Wound Description (Comments):    Removal  Indications:    Wound Image   01/27/23 0810   Dressing Appearance Intact;Moist drainage 01/27/23 0810   Drainage Amount Moderate 01/27/23 0810   Drainage Characteristics/Odor Serosanguineous 01/27/23 0810   Appearance Pink;Yellow;Epithelialization;Moist;Fibrin 01/27/23 0810   Black (%), Wound Tissue Color 0 % 01/27/23 0810   Red (%), Wound Tissue Color 80 % 01/27/23 0810   Yellow (%), Wound Tissue Color 20 % 01/27/23 0810   Periwound Area Intact;Moist 01/27/23 0810   Wound Edges Callused 01/27/23 0810   Wound Length (cm) 1.7 cm 01/27/23 0810   Wound Width (cm) 1.4 cm 01/27/23 0810   Wound Depth (cm) 0.4 cm 01/27/23 0810   Wound Volume (cm^3) 0.952 cm^3 01/27/23 0810   Wound Surface Area (cm^2) 2.38 cm^2 01/27/23 0810   Care Cleansed with:;Wound cleanser;Applied: 01/27/23 0810   Dressing Applied;Calcium alginate;Silver;Absorptive Pad;Rolled gauze 01/27/23 0810   Periwound Care Skin barrier film applied 01/27/23 0810               Assessment:       1. Ulcer of right great toe due to diabetes mellitus    2. Type 2 diabetes mellitus with foot ulcer, without long-term current use of insulin    3. Uncontrolled type 2 diabetes mellitus with hyperglycemia    4. Diabetic polyneuropathy associated with type 2 diabetes mellitus    5. Cellulitis of great toe of right foot    6. Medically noncompliant    7. Staph aureus infection    8. Enterococcus faecalis infection    9. Bipolar II disorder    10. Morbid obesity         Right hallux DFU: first clinic visit 5/19/22, neg xray: recalcitrant to healing complicated by his noncompliance and clinic absence; deterioration noted on 11/22/22 with toe cellulitis: rx bactrim and augmentin and then 2 weeks of Cipro prescribed on 12/13/2022/ +tissue cx for Enterococcus on 1/3/23: rx augmentin recalcitrant  Right foot MRI 10/5/22: no osteomyelitis  Prior left 2nd toe osteomyelitis with partial amputation by Dr CAMERON driscoll 2020, New ulcer left 2nd toe volar side: noted 6/9/22: closed     diabetes,uncontrolled:  hba1c 9.1 April 2022,  7.3 July 2022 up to 11.1 Dec 2022  Morbid obesity with bmi 54  Hypertension   Hyperlipidemia  H/o depression/bipolar  Medical noncompliance   Latest Reference Range & Units 12/30/22 09:20   Sed Rate 0 - 15 mm/hr 18 (H)   Prealbumin 18.0 - 45.0 mg/dL 27.5   CRP <5.00 mg/L 9.90 (H)   Hemoglobin A1C External <=7.0 % 11.1 (H)   Estimated Avg Glucose mg/dL 271.9   (H): Data is abnormally high  EXAMINATION:  XR TOE 2 OR MORE VIEWS RIGHT     CLINICAL HISTORY:  DFU right great toe/;     TECHNIQUE:  Three views of the right 1st toe     COMPARISON:  Radiographs 05/25/2022     FINDINGS:  Skin ulcer along the plantar aspect of the 1st toe.  No defined areas of osteolysis in this region.  No tracking subcutaneous gas.  Joint alignments are maintained.  No acute fracture.     Impression:     No acute osseous process appreciated.        Electronically signed by: Franko Valenzuela  Date:                                            12/30/2022  Time:                                           10:02    Patient brought me labs dated 12/09/2022  Cholesterol 271, triglycerides 587, , glucose 254, creatinine 0.98, liver enzymes normal, H/H 11/35    Cx 1/13/23  Aerobic Culture - Tissue Few Enterococcus faecalis Abnormal     with normal skin corey   Ampicillin results may be used to predict susceptibility to amoxicillin-clavulanate, ampicillin-sulbactam, and piperacillin-tazobactam.        Resulting Agency: [facility]     Susceptibility     Enterococcus faecalis     Not Specified     Ampicillin <=2  Sensitive     Gentamicin Synergy Screen SYN-R  Resistant     Penicillin 2  Sensitive     Streptomycin Synergy SYN-S  Sensitive                       Plan:           Plan of Care:    Debrided the DFU  Complete the 2 weeks of augmentin. Not having luck with his insurance covering IV antibiotics and what he would owe out of pocket  I am concerned this will not heal and will need amputation. Recent a1c  over 11, elevated inflammatory markers  but normal xray; will monitor closely  Continue offloading .  I think he is too large for a rolling knee scooter.  He does not have one  Return to clinic 1 week  Prognosis very guarded

## 2023-02-03 ENCOUNTER — HOSPITAL ENCOUNTER (OUTPATIENT)
Dept: WOUND CARE | Facility: HOSPITAL | Age: 53
Discharge: HOME | End: 2023-02-03
Attending: EMERGENCY MEDICINE
Payer: MEDICARE

## 2023-02-03 VITALS
RESPIRATION RATE: 20 BRPM | SYSTOLIC BLOOD PRESSURE: 141 MMHG | HEART RATE: 77 BPM | HEIGHT: 70 IN | WEIGHT: 315 LBS | TEMPERATURE: 98 F | DIASTOLIC BLOOD PRESSURE: 74 MMHG | BODY MASS INDEX: 45.1 KG/M2

## 2023-02-03 DIAGNOSIS — L97.509 TYPE 2 DIABETES MELLITUS WITH FOOT ULCER, WITHOUT LONG-TERM CURRENT USE OF INSULIN: ICD-10-CM

## 2023-02-03 DIAGNOSIS — L03.031 CELLULITIS OF GREAT TOE OF RIGHT FOOT: ICD-10-CM

## 2023-02-03 DIAGNOSIS — F31.81 BIPOLAR II DISORDER: ICD-10-CM

## 2023-02-03 DIAGNOSIS — E11.65 UNCONTROLLED TYPE 2 DIABETES MELLITUS WITH HYPERGLYCEMIA: ICD-10-CM

## 2023-02-03 DIAGNOSIS — L97.519 ULCER OF RIGHT GREAT TOE DUE TO DIABETES MELLITUS: Primary | ICD-10-CM

## 2023-02-03 DIAGNOSIS — E11.621 TYPE 2 DIABETES MELLITUS WITH FOOT ULCER, WITHOUT LONG-TERM CURRENT USE OF INSULIN: ICD-10-CM

## 2023-02-03 DIAGNOSIS — A49.01 STAPH AUREUS INFECTION: ICD-10-CM

## 2023-02-03 DIAGNOSIS — A49.8 ENTEROCOCCUS FAECALIS INFECTION: ICD-10-CM

## 2023-02-03 DIAGNOSIS — Z91.199 MEDICALLY NONCOMPLIANT: ICD-10-CM

## 2023-02-03 DIAGNOSIS — E11.621 ULCER OF RIGHT GREAT TOE DUE TO DIABETES MELLITUS: Primary | ICD-10-CM

## 2023-02-03 DIAGNOSIS — E66.01 MORBID OBESITY: ICD-10-CM

## 2023-02-03 DIAGNOSIS — E11.42 DIABETIC POLYNEUROPATHY ASSOCIATED WITH TYPE 2 DIABETES MELLITUS: ICD-10-CM

## 2023-02-03 LAB — POCT GLUCOSE: 320 MG/DL (ref 70–110)

## 2023-02-03 PROCEDURE — 99499 UNLISTED E&M SERVICE: CPT | Mod: NSCH,,, | Performed by: EMERGENCY MEDICINE

## 2023-02-03 PROCEDURE — 99499 NO LOS: ICD-10-PCS | Mod: NSCH,,, | Performed by: EMERGENCY MEDICINE

## 2023-02-03 PROCEDURE — 11042 DBRDMT SUBQ TIS 1ST 20SQCM/<: CPT

## 2023-02-03 PROCEDURE — 27000999 HC MEDICAL RECORD PHOTO DOCUMENTATION

## 2023-02-03 PROCEDURE — 11042 DBRDMT SUBQ TIS 1ST 20SQCM/<: CPT | Mod: NSCH,,, | Performed by: EMERGENCY MEDICINE

## 2023-02-03 PROCEDURE — 11042 DEBRIDEMENT: ICD-10-PCS | Mod: NSCH,,, | Performed by: EMERGENCY MEDICINE

## 2023-02-03 RX ORDER — AMOXICILLIN AND CLAVULANATE POTASSIUM 875; 125 MG/1; MG/1
1 TABLET, FILM COATED ORAL EVERY 12 HOURS
Qty: 28 TABLET | Refills: 0 | Status: SHIPPED | OUTPATIENT
Start: 2023-02-03 | End: 2023-02-17

## 2023-02-03 NOTE — PROCEDURES
"Debridement    Date/Time: 2/3/2023 7:35 AM  Performed by: Soheila Vidal MD  Authorized by: Soheila Vidal MD   Associated wounds:        (Retired) Wound 11/22/22 1053 Diabetic Ulcer Right medial;plantar Toe, first #1  Time out: Immediately prior to procedure a "time out" was called to verify the correct patient, procedure, equipment, support staff and site/side marked as required.    Consent Done?:  Yes (Written)    Preparation: Patient was prepped and draped in usual sterile fashion    Local anesthesia used?: Yes    Local anesthetic:  Topical anesthetic    Wound Details:    Location:  Right foot    Location:  Right 1st Toe    Type of Debridement:  Excisional       Length (cm):  1.7       Area (sq cm):  2.21       Width (cm):  1.3       Percent Debrided (%):  100       Depth (cm):  0.3       Total Area Debrided (sq cm):  2.21    Depth of debridement:  Subcutaneous tissue    Tissue debrided:  Dermis, Epidermis, Subcutaneous and Hypergranulation    Devitalized tissue debrided:  Biofilm and Slough    Instruments:  Curette    Bleeding:  Moderate  Hemostasis Achieved: Yes    Method Used:  Silver Nitrate and Pressure  Patient tolerance:  Patient tolerated the procedure well with no immediate complications  "

## 2023-02-03 NOTE — PROGRESS NOTES
Subjective:       Patient ID: Jong Clayton is a 52 y.o. male.    Chief Complaint: No chief complaint on file.      51 y/o  WM with morbid obesity, diabetes, neuropathy hypertension, dyslipidemia, chronic back pain, bipolar and depression who has battled various DFU's with osteomyelitis (left 2nd toe 2019 and again 2020). I have treated him in past for this left 2nd toe and noted then he was noncompliant with my recommendations. I last saw him for that left 2nd toe in March 2020 where he had a bulbous left 2nd hammer toe with +MRI for osteo. He never returned as scheduled but ultimately followed through with my prior recs of distal tip amputation.    He returned to clinic in mid May 2022 complaining of a chronic untreated right plantar great toe ulcer which began in Dec 2021 that he tried to self treat. His wife finally got him to agree to come to clinic but unfortunately he continues to demonstrate noncompliance along with ongoing uncontrolled diabetes with self admitted poor diet . In addition, we are limited in our treatment options because of his insurance:   For example they refused my request for IV and/or IM antibiotics in fall of 2022).  Back in the summer I ordered an MRI of this right big toe which took him months to get done (but finally had it in October and it did not demonstrate osteomyelitis).  Patient came into clinic at  the end of November after a >1month absence with a significant decline in the right toe wound with evidence of bruising . I prescribed 2 antibiotics: bactrim and augmentin. Cellulitis and periwound blistering resolved but left with a larger DFU that is not getting better. I placed on more antibiotics :  most recently augmentin for +Enterococcus cx. His most recent  HBa1c over 11.  He is at high-risk for needing this toe amputated.  Trying to get IV antibiotics again but can't afford copay on what insurance will allow. I have had him on Augmentin based on mid jan 2023 cx of  Enterococcus. Will send in another 2 weeks worth      Review of Systems   Constitutional:  Positive for fatigue. Negative for chills and fever.   HENT: Negative.     Respiratory: Negative.     Cardiovascular: Negative.    Gastrointestinal: Negative.    Musculoskeletal:  Positive for back pain.   Skin:  Positive for wound (see hpi).   Neurological:  Positive for numbness (to both feet). Negative for tremors, seizures, syncope and speech difficulty.       Objective:       Vitals:    02/03/23 0759   BP: (!) 141/74   Pulse: 77   Resp: 20   Temp: 98 °F (36.7 °C)       No results for input(s): POCTGLUCOSE in the last 24 hours.     Physical Exam  Constitutional:       Appearance: He is morbidly obese.   HENT:      Head: Normocephalic and atraumatic.   Eyes:      Conjunctiva/sclera: Conjunctivae normal.   Cardiovascular:      Pulses: Normal pulses.           Dorsalis pedis pulses are 2+ on the right side and 2+ on the left side.      Comments: +hair on legs  Musculoskeletal:      Right lower leg: No edema.      Left lower leg: No edema.        Feet:    Feet:      Comments: Hallux valgus deformity noted  Skin:     Capillary Refill: Capillary refill takes less than 2 seconds.   Neurological:      General: No focal deficit present.      Mental Status: He is alert and oriented to person, place, and time. Mental status is at baseline.            (Retired) Wound 11/22/22 1053 Diabetic Ulcer Right medial;plantar Toe, first #1 (Active)   11/22/22 1053    Pre-existing: Yes   Primary Wound Type: Diabetic ul   Side: Right   Orientation: medial;plantar   Location: Toe, first   Wound Number: #1   Ankle-Brachial Index: paul done 1/20/2023 = right dp = 1.01, pt = .99   Pulses: + palpable x 4, + doppler, right  bi phasic x2,   left dp/pt= biphasic   Removal Indication and Assessment:    Wound Outcome:    (Retired) Wound Type:    (Retired) Wound Length (cm):    (Retired) Wound Width (cm):    (Retired) Depth (cm):    Wound Description  (Comments):    Removal Indications:    Wound Image   02/03/23 0817   Dressing Appearance Moist drainage 02/03/23 0817   Drainage Amount Moderate 02/03/23 0817   Drainage Characteristics/Odor Serosanguineous 02/03/23 0817   Appearance Red;Fibrin;Moist 02/03/23 0817   Black (%), Wound Tissue Color 0 % 02/03/23 0817   Red (%), Wound Tissue Color 100 % 02/03/23 0817   Yellow (%), Wound Tissue Color 0 % 02/03/23 0817   Periwound Area Moist 02/03/23 0817   Wound Edges Callused;Defined 02/03/23 0817   Wound Length (cm) 1.7 cm 02/03/23 0817   Wound Width (cm) 1.3 cm 02/03/23 0817   Wound Depth (cm) 0.3 cm 02/03/23 0817   Wound Volume (cm^3) 0.663 cm^3 02/03/23 0817   Wound Surface Area (cm^2) 2.21 cm^2 02/03/23 0817   Care Cleansed with:;Wound cleanser;Applied: 02/03/23 0817   Dressing Applied;Calcium alginate;Silver;Absorptive Pad;Rolled gauze 02/03/23 0817   Periwound Care Skin barrier film applied 02/03/23 0817               Assessment:       1. Ulcer of right great toe due to diabetes mellitus    2. Type 2 diabetes mellitus with foot ulcer, without long-term current use of insulin    3. Uncontrolled type 2 diabetes mellitus with hyperglycemia    4. Diabetic polyneuropathy associated with type 2 diabetes mellitus    5. Cellulitis of great toe of right foot    6. Medically noncompliant    7. Staph aureus infection    8. Enterococcus faecalis infection    9. Bipolar II disorder    10. Morbid obesity         Right hallux DFU: first clinic visit 5/19/22, neg xray: recalcitrant to healing complicated by his noncompliance and clinic absence; deterioration noted on 11/22/22 with toe cellulitis: rx bactrim and augmentin and then 2 weeks of Cipro prescribed on 12/13/2022/ +tissue cx for Enterococcus on 1/3/23: rx augmentin recalcitrant  Right foot MRI 10/5/22: no osteomyelitis  Prior left 2nd toe osteomyelitis with partial amputation by Dr CAMERON driscoll 2020, New ulcer left 2nd toe volar side: noted 6/9/22: closed     diabetes,uncontrolled:  hba1c 9.1 April 2022,  7.3 July 2022 up to 11.1 Dec 2022  Morbid obesity with bmi 54  Hypertension   Hyperlipidemia  H/o depression/bipolar  Medical noncompliance   Latest Reference Range & Units 12/30/22 09:20   Sed Rate 0 - 15 mm/hr 18 (H)   Prealbumin 18.0 - 45.0 mg/dL 27.5   CRP <5.00 mg/L 9.90 (H)   Hemoglobin A1C External <=7.0 % 11.1 (H)   Estimated Avg Glucose mg/dL 271.9   (H): Data is abnormally high  EXAMINATION:  XR TOE 2 OR MORE VIEWS RIGHT     CLINICAL HISTORY:  DFU right great toe/;     TECHNIQUE:  Three views of the right 1st toe     COMPARISON:  Radiographs 05/25/2022     FINDINGS:  Skin ulcer along the plantar aspect of the 1st toe.  No defined areas of osteolysis in this region.  No tracking subcutaneous gas.  Joint alignments are maintained.  No acute fracture.     Impression:     No acute osseous process appreciated.        Electronically signed by: Franko Valenzuela  Date:                                            12/30/2022  Time:                                           10:02    Patient brought me labs dated 12/09/2022  Cholesterol 271, triglycerides 587, , glucose 254, creatinine 0.98, liver enzymes normal, H/H 11/35    Cx 1/13/23  Aerobic Culture - Tissue Few Enterococcus faecalis Abnormal     with normal skin corey   Ampicillin results may be used to predict susceptibility to amoxicillin-clavulanate, ampicillin-sulbactam, and piperacillin-tazobactam.        Resulting Agency: [facility]     Susceptibility     Enterococcus faecalis     Not Specified     Ampicillin <=2  Sensitive     Gentamicin Synergy Screen SYN-R  Resistant     Penicillin 2  Sensitive     Streptomycin Synergy SYN-S  Sensitive                       Plan:           Plan of Care:    Debrided the DFU  Will order another 2 weeks of Augmentin; can't afford copay on IV antibiotics  I am concerned this will not heal and will need amputation. Recent a1c over 11, elevated inflammatory markers  but normal  xray; will monitor closely  Continue offloading .  I think he is too large for a rolling knee scooter.  He does not have one  Return to clinic 1 week  Prognosis very guarded

## 2023-02-03 NOTE — PATIENT INSTRUCTIONS
Pt seen today by:     Self care DRESSING INSTRUCTIONS:      Wound location: Right Great Toe     Continue antibiotics as directed       Dressings to be changed daily or every other day and as needed if soiled or not intact.  Cleanse wound with wound cleanser or saline  Cover with calcium alginate silver  May alternate wound care with use of therahoney gel  Cover with abd pad and secure with a shaka/cover roll tape  Wear Darco shoe    Return to clinic on Friday Feb. 17th 2023 at 8am     Wound may have been debrided in clinic: if so, WHAT YOU NEED TO KNOW:    Debridement is the removal of infected, damaged, or dead tissue so a wound can heal properly. Your wound may need more than one debridement. Debridement can cause bleeding, and a small amount of blood is expected.  AFTER A DEBRIDEMENT:  Keep your wound clean and dry. Do not remove the dressing unless instructed.  Follow the wound care orders provided to you or your home health care provider.  If you have pain, take over the counter pain relievers or pain medication if prescribed.  Elevate the wound and limit excessive activity to prevent bleeding and/or swelling in your wound.  If you see blood coming through the dressing, apply gauze and tape over the dressing and hold firm pressure to the wound with your hand for 5-10 minutes continuously, without peeking, to help the bleeding stop.  Contact [facility] wound care team at [phone] or go to the nearest Emergency department if:  You have a fever greater than 101 taken by mouth.  Your pain gets worse or does not go away, even after taking your regular pain medicine.  Your skin around your wound is red, hot, swollen, or draining pus.  You have bleeding that continues to come through the dressing after holding pressure for 10 minutes   The current daily value (%DV) for protein is 50 grams per day and is meant as a general goal for most people. Further increasing your dietary protein intake is very important  for wound healing. Typically one needs over 100g of protein per day to help with wound healing needs.  If you are a dialysis patient or have problems with your kidneys, talk to your Nephrologist about how much protein you can take in with your condition.  Examples of high protein items that can be added to your diet include: eggs, chicken, red meats, almonds, cottage cheese, Greek yogurt, beans, and peanut butter.  Fortified protein bars, shakes and drinks can add 15-30 additional grams of protein per serving.   Also add:   1 daily general multivitamin   Sidney : 1 packet twice daily   Vitamin C : 500mg twice daily   Zinc 220 mg daily  Vit D : once daily    Call our [facility] wound clinic for questions/concerns a [phone] .

## 2023-02-17 ENCOUNTER — HOSPITAL ENCOUNTER (OUTPATIENT)
Dept: WOUND CARE | Facility: HOSPITAL | Age: 53
Discharge: HOME | End: 2023-02-17
Attending: EMERGENCY MEDICINE
Payer: MEDICARE

## 2023-02-17 VITALS
HEIGHT: 70 IN | HEART RATE: 80 BPM | RESPIRATION RATE: 20 BRPM | WEIGHT: 315 LBS | BODY MASS INDEX: 45.1 KG/M2 | SYSTOLIC BLOOD PRESSURE: 145 MMHG | DIASTOLIC BLOOD PRESSURE: 75 MMHG | TEMPERATURE: 98 F

## 2023-02-17 DIAGNOSIS — E11.42 DIABETIC POLYNEUROPATHY ASSOCIATED WITH TYPE 2 DIABETES MELLITUS: ICD-10-CM

## 2023-02-17 DIAGNOSIS — A49.01 STAPH AUREUS INFECTION: ICD-10-CM

## 2023-02-17 DIAGNOSIS — E11.621 ULCER OF RIGHT GREAT TOE DUE TO DIABETES MELLITUS: Primary | ICD-10-CM

## 2023-02-17 DIAGNOSIS — F31.81 BIPOLAR II DISORDER: ICD-10-CM

## 2023-02-17 DIAGNOSIS — E11.65 UNCONTROLLED TYPE 2 DIABETES MELLITUS WITH HYPERGLYCEMIA: ICD-10-CM

## 2023-02-17 DIAGNOSIS — L03.031 CELLULITIS OF GREAT TOE OF RIGHT FOOT: ICD-10-CM

## 2023-02-17 DIAGNOSIS — L97.519 ULCER OF RIGHT GREAT TOE DUE TO DIABETES MELLITUS: Primary | ICD-10-CM

## 2023-02-17 DIAGNOSIS — Z91.199 MEDICALLY NONCOMPLIANT: ICD-10-CM

## 2023-02-17 DIAGNOSIS — L97.509 TYPE 2 DIABETES MELLITUS WITH FOOT ULCER, WITHOUT LONG-TERM CURRENT USE OF INSULIN: ICD-10-CM

## 2023-02-17 DIAGNOSIS — E11.621 TYPE 2 DIABETES MELLITUS WITH FOOT ULCER, WITHOUT LONG-TERM CURRENT USE OF INSULIN: ICD-10-CM

## 2023-02-17 DIAGNOSIS — E66.01 MORBID OBESITY: ICD-10-CM

## 2023-02-17 DIAGNOSIS — A49.8 ENTEROCOCCUS FAECALIS INFECTION: ICD-10-CM

## 2023-02-17 DIAGNOSIS — A49.1 GROUP B STREPTOCOCCAL INFECTION: ICD-10-CM

## 2023-02-17 LAB — POCT GLUCOSE: 383 MG/DL (ref 70–110)

## 2023-02-17 PROCEDURE — 99499 NO LOS: ICD-10-PCS | Mod: NSCH,,, | Performed by: EMERGENCY MEDICINE

## 2023-02-17 PROCEDURE — 11042 DBRDMT SUBQ TIS 1ST 20SQCM/<: CPT

## 2023-02-17 PROCEDURE — 11042 DEBRIDEMENT: ICD-10-PCS | Mod: NSCH,,, | Performed by: EMERGENCY MEDICINE

## 2023-02-17 PROCEDURE — 99499 UNLISTED E&M SERVICE: CPT | Mod: NSCH,,, | Performed by: EMERGENCY MEDICINE

## 2023-02-17 PROCEDURE — 11042 DBRDMT SUBQ TIS 1ST 20SQCM/<: CPT | Mod: NSCH,,, | Performed by: EMERGENCY MEDICINE

## 2023-02-17 PROCEDURE — 27000999 HC MEDICAL RECORD PHOTO DOCUMENTATION

## 2023-02-17 NOTE — PROGRESS NOTES
Subjective:       Patient ID: Jong Clayton is a 52 y.o. male.    Chief Complaint: No chief complaint on file.      51 y/o  WM with morbid obesity, diabetes, neuropathy hypertension, dyslipidemia, chronic back pain, bipolar and depression who has battled various DFU's with osteomyelitis (left 2nd toe 2019 and again 2020). I have treated him in past for this left 2nd toe and noted then he was noncompliant with my recommendations. I last saw him for that left 2nd toe in March 2020 where he had a bulbous left 2nd hammer toe with +MRI for osteo. He never returned as scheduled but ultimately followed through with my prior recs of distal tip amputation.    He returned to clinic in mid May 2022 complaining of a chronic untreated right plantar great toe ulcer which began in Dec 2021 that he tried to self treat. His wife finally got him to agree to come to clinic but unfortunately he continues to demonstrate noncompliance along with ongoing uncontrolled diabetes with self admitted poor diet . In addition, we are limited in our treatment options because of his insurance:   For example they refused my request for IV and/or IM antibiotics in fall of 2022).  Back in the summer I ordered an MRI of this right big toe which took him months to get done (but finally had it in October and it did not demonstrate osteomyelitis).  Patient came into clinic at  the end of November after a >1month absence with a significant decline in the right toe wound with evidence of bruising . I prescribed 2 antibiotics: bactrim and augmentin. Cellulitis and periwound blistering resolved but left with a larger DFU that is not getting better. I placed on more antibiotics :  most recently augmentin for +Enterococcus cx. His most recent  HBa1c over 11.  He is at high-risk for needing this toe amputated.  Trying to get IV antibiotics again but can't afford copay on what insurance will allow. He is about to complete 4 weeks of Augmentin for +cx of  enterococcus.comes in today on 2/17/23 and says it is better      Review of Systems   Constitutional:  Positive for fatigue. Negative for chills and fever.   HENT: Negative.     Respiratory: Negative.     Cardiovascular: Negative.    Gastrointestinal: Negative.    Musculoskeletal:  Positive for back pain.   Skin:  Positive for wound (see hpi).   Neurological:  Positive for numbness (to both feet). Negative for tremors, seizures, syncope and speech difficulty.       Objective:       Vitals:    02/17/23 0759   BP: (!) 145/75   Pulse: 80   Resp: 20   Temp: 97.9 °F (36.6 °C)       Recent Labs   Lab 02/17/23  0759   POCTGLUCOSE 383*        Physical Exam  Constitutional:       Appearance: He is morbidly obese.   HENT:      Head: Normocephalic and atraumatic.   Eyes:      Conjunctiva/sclera: Conjunctivae normal.   Cardiovascular:      Pulses: Normal pulses.           Dorsalis pedis pulses are 2+ on the right side and 2+ on the left side.      Comments: +hair on legs  Musculoskeletal:      Right lower leg: No edema.      Left lower leg: No edema.        Feet:    Feet:      Comments: Hallux valgus deformity noted  Skin:     Capillary Refill: Capillary refill takes less than 2 seconds.   Neurological:      General: No focal deficit present.      Mental Status: He is alert and oriented to person, place, and time. Mental status is at baseline.            (Retired) Wound 11/22/22 1053 Diabetic Ulcer Right medial;plantar Toe, first #1 (Active)   11/22/22 1053    Pre-existing: Yes   Primary Wound Type: Diabetic [facility]   Side: Right   Orientation: medial;plantar   Location: Toe, first   Wound Number: #1   Ankle-Brachial Index: paul done 2/17/2023 = right dp = 1.05, pt = 1.02   Pulses: + palpable x 4, + doppler, right  bi phasic x2,   left= biphasic x 2   Removal Indication and Assessment:    Wound Outcome:    (Retired) Wound Type:    (Retired) Wound Length (cm):    (Retired) Wound Width (cm):    (Retired) Depth (cm):    Wound  Description (Comments):    Removal Indications:    Wound Image   02/17/23 0805   Dressing Appearance Moist drainage 02/17/23 0805   Drainage Amount Moderate 02/17/23 0805   Drainage Characteristics/Odor Serosanguineous 02/17/23 0805   Appearance Red;Yellow 02/17/23 0805   Black (%), Wound Tissue Color 0 % 02/17/23 0805   Red (%), Wound Tissue Color 90 % 02/17/23 0805   Yellow (%), Wound Tissue Color 10 % 02/17/23 0805   Periwound Area Intact 02/17/23 0805   Wound Edges Callused 02/17/23 0805   Wound Length (cm) 1.5 cm 02/17/23 0805   Wound Width (cm) 0.8 cm 02/17/23 0805   Wound Depth (cm) 0.2 cm 02/17/23 0805   Wound Volume (cm^3) 0.24 cm^3 02/17/23 0805   Wound Surface Area (cm^2) 1.2 cm^2 02/17/23 0805   Care Cleansed with:;Wound cleanser;Applied: 02/17/23 0805               Assessment:       1. Ulcer of right great toe due to diabetes mellitus    2. Type 2 diabetes mellitus with foot ulcer, without long-term current use of insulin    3. Uncontrolled type 2 diabetes mellitus with hyperglycemia    4. Diabetic polyneuropathy associated with type 2 diabetes mellitus    5. Cellulitis of great toe of right foot    6. Medically noncompliant    7. Staph aureus infection    8. Bipolar II disorder    9. Group B streptococcal infection    10. Enterococcus faecalis infection    11. Morbid obesity         Right hallux DFU: first clinic visit 5/19/22, neg xray: recalcitrant to healing complicated by his noncompliance and clinic absence; deterioration noted on 11/22/22 with toe cellulitis: rx bactrim and augmentin and then 2 weeks of Cipro prescribed on 12/13/2022/ +tissue cx for Enterococcus on 1/3/23: rx augmentin x 4-6 weeks total  recalcitrant  Right foot MRI 10/5/22: no osteomyelitis  Prior left 2nd toe osteomyelitis with partial amputation by Dr CAMERON driscoll 2020, New ulcer left 2nd toe volar side: noted 6/9/22: closed    diabetes,uncontrolled:  hba1c 9.1 April 2022,  7.3 July 2022 up to 11.1 Dec 2022  Morbid obesity with  bmi 54  Hypertension   Hyperlipidemia  H/o depression/bipolar  Medical noncompliance   Latest Reference Range & Units 12/30/22 09:20   Sed Rate 0 - 15 mm/hr 18 (H)   Prealbumin 18.0 - 45.0 mg/dL 27.5   CRP <5.00 mg/L 9.90 (H)   Hemoglobin A1C External <=7.0 % 11.1 (H)   Estimated Avg Glucose mg/dL 271.9   (H): Data is abnormally high  EXAMINATION:  XR TOE 2 OR MORE VIEWS RIGHT     CLINICAL HISTORY:  DFU right great toe/;     TECHNIQUE:  Three views of the right 1st toe     COMPARISON:  Radiographs 05/25/2022     FINDINGS:  Skin ulcer along the plantar aspect of the 1st toe.  No defined areas of osteolysis in this region.  No tracking subcutaneous gas.  Joint alignments are maintained.  No acute fracture.     Impression:     No acute osseous process appreciated.        Electronically signed by: Franko Valenzuela  Date:                                            12/30/2022  Time:                                           10:02    Patient brought me labs dated 12/09/2022  Cholesterol 271, triglycerides 587, , glucose 254, creatinine 0.98, liver enzymes normal, H/H 11/35    Cx 1/13/23  Aerobic Culture - Tissue Few Enterococcus faecalis Abnormal     with normal skin corey   Ampicillin results may be used to predict susceptibility to amoxicillin-clavulanate, ampicillin-sulbactam, and piperacillin-tazobactam.        Resulting Agency: [facility]     Susceptibility     Enterococcus faecalis     Not Specified     Ampicillin <=2  Sensitive     Gentamicin Synergy Screen SYN-R  Resistant     Penicillin 2  Sensitive     Streptomycin Synergy SYN-S  Sensitive                       Plan:         Plan of Care:    Wound was assessed  Wound was debrided today.bleeding controlled with silver nitrate and pressure before being re-dressed  Pt tolerated well  It does look better today; measures smaller with flatter wound bed without that deeper slit  Wound Care Orders: dressings of honey; he alternates with collage /alginate at home; his  wife does the care dailys              Offloading: must offload the wound to potentiate wound healing: use darco shoe, rolling knee scooter, ROHO/offloading cushion , MERNA mattress with frequent turning/standing;  Nutrition: Must have a high protein diet to support wound  healing; (if renal disease, see nephrologist for amount allowed):  this should be over 100g protein /day (if no kidney issues); Also rec MVI along with vit C, vit D, zinc and Sidney  Diabetes: must have a strict diabetic diet, take meds and encourage lower hba1c His DM is very uncontrolled and always hyperglycemic; PCP aware and pt aware he must do better

## 2023-02-17 NOTE — PROCEDURES
Debridement    Date/Time: 2/17/2023 7:41 AM  Performed by: Soheila Vidal MD  Authorized by: Soheila Vidal MD   Associated wounds:        (Retired) Wound 11/22/22 1053 Diabetic Ulcer Right medial;plantar Toe, first #1  Consent Done?:  Yes (Verbal)    Preparation: Patient was prepped and draped in usual sterile fashion    Local anesthesia used?: Yes    Local anesthetic:  Topical anesthetic    Wound Details:    Location:  Right foot    Type of Debridement:  Excisional       Length (cm):  1.5       Area (sq cm):  1.5       Width (cm):  1       Percent Debrided (%):  100       Depth (cm):  0.2       Total Area Debrided (sq cm):  1.5    Depth of debridement:  Subcutaneous tissue    Tissue debrided:  Dermis, Epidermis and Subcutaneous    Devitalized tissue debrided:  Biofilm and Slough    Instruments:  Curette    Bleeding:  Moderate  Hemostasis Achieved: Yes    Method Used:  Pressure and Silver Nitrate  Patient tolerance:  Patient tolerated the procedure well with no immediate complications

## 2023-02-17 NOTE — PATIENT INSTRUCTIONS
Pt seen today by:     Self care DRESSING INSTRUCTIONS:      Wound location: Right Great Toe     Continue antibiotics until complete      Dressings to be changed daily or every other day and as needed if soiled or not intact.  Cleanse wound with wound cleanser or saline  Cover with calcium alginate silver  May alternate wound care with use of therahoney gel  Cover with abd pad and secure with a shaka/cover roll tape  Wear Darco shoe    Return to clinic on Friday Feb. 24th 2023 at 8am     Wound may have been debrided in clinic: if so, WHAT YOU NEED TO KNOW:    Debridement is the removal of infected, damaged, or dead tissue so a wound can heal properly. Your wound may need more than one debridement. Debridement can cause bleeding, and a small amount of blood is expected.  AFTER A DEBRIDEMENT:  Keep your wound clean and dry. Do not remove the dressing unless instructed.  Follow the wound care orders provided to you or your home health care provider.  If you have pain, take over the counter pain relievers or pain medication if prescribed.  Elevate the wound and limit excessive activity to prevent bleeding and/or swelling in your wound.  If you see blood coming through the dressing, apply gauze and tape over the dressing and hold firm pressure to the wound with your hand for 5-10 minutes continuously, without peeking, to help the bleeding stop.  Contact [facility] wound care team at [phone] or go to the nearest Emergency department if:  You have a fever greater than 101 taken by mouth.  Your pain gets worse or does not go away, even after taking your regular pain medicine.  Your skin around your wound is red, hot, swollen, or draining pus.  You have bleeding that continues to come through the dressing after holding pressure for 10 minutes   The current daily value (%DV) for protein is 50 grams per day and is meant as a general goal for most people. Further increasing your dietary protein intake is very  important for wound healing. Typically one needs over 100g of protein per day to help with wound healing needs.  If you are a dialysis patient or have problems with your kidneys, talk to your Nephrologist about how much protein you can take in with your condition.  Examples of high protein items that can be added to your diet include: eggs, chicken, red meats, almonds, cottage cheese, Greek yogurt, beans, and peanut butter.  Fortified protein bars, shakes and drinks can add 15-30 additional grams of protein per serving.   Also add:   1 daily general multivitamin   Sidney : 1 packet twice daily   Vitamin C : 500mg twice daily   Zinc 220 mg daily  Vit D : once daily    Call our [facility] wound clinic for questions/concerns a [phone] .

## 2023-02-24 ENCOUNTER — HOSPITAL ENCOUNTER (OUTPATIENT)
Dept: WOUND CARE | Facility: HOSPITAL | Age: 53
Discharge: HOME | End: 2023-02-24
Attending: EMERGENCY MEDICINE
Payer: MEDICARE

## 2023-02-24 VITALS
DIASTOLIC BLOOD PRESSURE: 80 MMHG | WEIGHT: 315 LBS | BODY MASS INDEX: 45.1 KG/M2 | RESPIRATION RATE: 18 BRPM | HEART RATE: 77 BPM | SYSTOLIC BLOOD PRESSURE: 170 MMHG | TEMPERATURE: 99 F | HEIGHT: 70 IN

## 2023-02-24 DIAGNOSIS — L97.509 TYPE 2 DIABETES MELLITUS WITH FOOT ULCER, WITHOUT LONG-TERM CURRENT USE OF INSULIN: ICD-10-CM

## 2023-02-24 DIAGNOSIS — E11.621 TYPE 2 DIABETES MELLITUS WITH FOOT ULCER, WITHOUT LONG-TERM CURRENT USE OF INSULIN: ICD-10-CM

## 2023-02-24 DIAGNOSIS — L03.031 CELLULITIS OF GREAT TOE OF RIGHT FOOT: ICD-10-CM

## 2023-02-24 DIAGNOSIS — A49.8 ENTEROCOCCUS FAECALIS INFECTION: ICD-10-CM

## 2023-02-24 DIAGNOSIS — L97.519 ULCER OF RIGHT GREAT TOE DUE TO DIABETES MELLITUS: Primary | ICD-10-CM

## 2023-02-24 DIAGNOSIS — E11.65 UNCONTROLLED TYPE 2 DIABETES MELLITUS WITH HYPERGLYCEMIA: ICD-10-CM

## 2023-02-24 DIAGNOSIS — A49.01 STAPH AUREUS INFECTION: ICD-10-CM

## 2023-02-24 DIAGNOSIS — E11.621 ULCER OF RIGHT GREAT TOE DUE TO DIABETES MELLITUS: Primary | ICD-10-CM

## 2023-02-24 DIAGNOSIS — E66.01 MORBID OBESITY: ICD-10-CM

## 2023-02-24 DIAGNOSIS — A49.1 GROUP B STREPTOCOCCAL INFECTION: ICD-10-CM

## 2023-02-24 DIAGNOSIS — E11.42 DIABETIC POLYNEUROPATHY ASSOCIATED WITH TYPE 2 DIABETES MELLITUS: ICD-10-CM

## 2023-02-24 LAB — POCT GLUCOSE: 286 MG/DL (ref 70–110)

## 2023-02-24 PROCEDURE — 99499 NO LOS: ICD-10-PCS | Mod: NSCH,,, | Performed by: EMERGENCY MEDICINE

## 2023-02-24 PROCEDURE — 99499 UNLISTED E&M SERVICE: CPT | Mod: NSCH,,, | Performed by: EMERGENCY MEDICINE

## 2023-02-24 PROCEDURE — 27000999 HC MEDICAL RECORD PHOTO DOCUMENTATION

## 2023-02-24 PROCEDURE — 11042 DEBRIDEMENT: ICD-10-PCS | Mod: NSCH,,, | Performed by: EMERGENCY MEDICINE

## 2023-02-24 PROCEDURE — 11042 DBRDMT SUBQ TIS 1ST 20SQCM/<: CPT

## 2023-02-24 PROCEDURE — 11042 DBRDMT SUBQ TIS 1ST 20SQCM/<: CPT | Mod: NSCH,,, | Performed by: EMERGENCY MEDICINE

## 2023-02-24 NOTE — PROCEDURES
"Debridement    Date/Time: 2/24/2023 7:39 AM  Performed by: Soheila Vidal MD  Authorized by: Soheila Vidal MD   Associated wounds:        (Retired) Wound 11/22/22 1053 Diabetic Ulcer Right medial;plantar Toe, first #1  Time out: Immediately prior to procedure a "time out" was called to verify the correct patient, procedure, equipment, support staff and site/side marked as required.    Consent Done?:  Yes (Written)    Preparation: Patient was prepped and draped in usual sterile fashion    Local anesthesia used?: Yes    Local anesthetic:  Topical anesthetic    Wound Details:    Location:  Right foot    Location:  Right 1st Toe    Type of Debridement:  Excisional       Length (cm):  1       Area (sq cm):  0.8       Width (cm):  0.8       Percent Debrided (%):  100       Depth (cm):  0.3       Total Area Debrided (sq cm):  0.8    Depth of debridement:  Subcutaneous tissue    Tissue debrided:  Dermis, Epidermis and Subcutaneous    Devitalized tissue debrided:  Biofilm and Slough    Instruments:  Curette    Bleeding:  Minimal  Hemostasis Achieved: Yes    Method Used:  Pressure  Patient tolerance:  Patient tolerated the procedure well with no immediate complications  "

## 2023-02-24 NOTE — PATIENT INSTRUCTIONS
Pt seen today by:     Self care DRESSING INSTRUCTIONS:      Wound location: Right Great Toe     Continue antibiotics until complete      Dressings to be changed daily or every other day and as needed if soiled or not intact.  Cleanse wound with wound cleanser or saline  Cover with calcium alginate silver  May alternate wound care with use of therahoney gel  Cover with abd pad and secure with a shaka/cover roll tape  Wear Darco shoe    Return to clinic on Friday March 3rd 2023 at 8am     Wound may have been debrided in clinic: if so, WHAT YOU NEED TO KNOW:    Debridement is the removal of infected, damaged, or dead tissue so a wound can heal properly. Your wound may need more than one debridement. Debridement can cause bleeding, and a small amount of blood is expected.  AFTER A DEBRIDEMENT:  Keep your wound clean and dry. Do not remove the dressing unless instructed.  Follow the wound care orders provided to you or your home health care provider.  If you have pain, take over the counter pain relievers or pain medication if prescribed.  Elevate the wound and limit excessive activity to prevent bleeding and/or swelling in your wound.  If you see blood coming through the dressing, apply gauze and tape over the dressing and hold firm pressure to the wound with your hand for 5-10 minutes continuously, without peeking, to help the bleeding stop.  Contact [facility] wound care team at [phone] or go to the nearest Emergency department if:  You have a fever greater than 101 taken by mouth.  Your pain gets worse or does not go away, even after taking your regular pain medicine.  Your skin around your wound is red, hot, swollen, or draining pus.  You have bleeding that continues to come through the dressing after holding pressure for 10 minutes   The current daily value (%DV) for protein is 50 grams per day and is meant as a general goal for most people. Further increasing your dietary protein intake is very  important for wound healing. Typically one needs over 100g of protein per day to help with wound healing needs.  If you are a dialysis patient or have problems with your kidneys, talk to your Nephrologist about how much protein you can take in with your condition.  Examples of high protein items that can be added to your diet include: eggs, chicken, red meats, almonds, cottage cheese, Greek yogurt, beans, and peanut butter.  Fortified protein bars, shakes and drinks can add 15-30 additional grams of protein per serving.   Also add:   1 daily general multivitamin   Sidney : 1 packet twice daily   Vitamin C : 500mg twice daily   Zinc 220 mg daily  Vit D : once daily    Call our [facility] wound clinic for questions/concerns a [phone] .

## 2023-02-24 NOTE — PROGRESS NOTES
Subjective:       Patient ID: Jong Clayton is a 52 y.o. male.    Chief Complaint: No chief complaint on file.      51 y/o  WM with morbid obesity, diabetes, neuropathy hypertension, dyslipidemia, chronic back pain, bipolar and depression who has battled various DFU's with osteomyelitis (left 2nd toe 2019 and again 2020). I have treated him in past for this left 2nd toe and noted then he was noncompliant with my recommendations. I last saw him for that left 2nd toe in March 2020 where he had a bulbous left 2nd hammer toe with +MRI for osteo. He never returned as scheduled but ultimately followed through with my prior recs of distal tip amputation.    He returned to clinic in mid May 2022 complaining of a chronic untreated right plantar great toe ulcer which began in Dec 2021 that he tried to self treat. His wife finally got him to agree to come to clinic but unfortunately he continues to demonstrate noncompliance along with ongoing uncontrolled diabetes with self admitted poor diet . In addition, we are limited in our treatment options because of his insurance:   For example they refused my request for IV and/or IM antibiotics in fall of 2022).  Back in the summer I ordered an MRI of this right big toe which took him months to get done (but finally had it in October and it did not demonstrate osteomyelitis).  Patient came into clinic at  the end of November after a >1month absence with a significant decline in the right toe wound with evidence of bruising . I prescribed 2 antibiotics: bactrim and augmentin. Cellulitis and periwound blistering resolved but left with a larger DFU that is not getting better. I placed on more antibiotics :  most recently augmentin for +Enterococcus cx. His most recent  HBa1c over 11.  He is at high-risk for needing this toe amputated.  Trying to get IV antibiotics again but can't afford copay on what insurance will allow. He has completed over one month of  Augmentin for +cx of  enterococcus.no complaints today      Review of Systems   Constitutional:  Positive for fatigue. Negative for chills and fever.   HENT: Negative.     Respiratory: Negative.     Cardiovascular: Negative.    Gastrointestinal: Negative.    Musculoskeletal:  Positive for back pain.   Skin:  Positive for wound (see hpi).   Neurological:  Positive for numbness (to both feet). Negative for tremors, seizures, syncope and speech difficulty.       Objective:       Vitals:    02/24/23 0759   BP: (!) 170/80   Pulse: 77   Resp: 18   Temp: 98.5 °F (36.9 °C)       Recent Labs   Lab 02/24/23  0803   POCTGLUCOSE 286*        Physical Exam  Constitutional:       Appearance: He is morbidly obese.   HENT:      Head: Normocephalic and atraumatic.   Eyes:      Conjunctiva/sclera: Conjunctivae normal.   Cardiovascular:      Pulses: Normal pulses.           Dorsalis pedis pulses are 2+ on the right side and 2+ on the left side.      Comments: +hair on legs  Musculoskeletal:      Right lower leg: No edema.      Left lower leg: No edema.        Feet:    Feet:      Comments: Hallux valgus deformity noted  Skin:     General: Skin is warm.      Capillary Refill: Capillary refill takes less than 2 seconds.   Neurological:      General: No focal deficit present.      Mental Status: He is alert and oriented to person, place, and time. Mental status is at baseline.            (Retired) Wound 11/22/22 1053 Diabetic Ulcer Right medial;plantar Toe, first #1 (Active)   11/22/22 1053    Pre-existing: Yes   Primary Wound Type: Diabetic ulc   Side: Right   Orientation: medial;plantar   Location: Toe, first   Wound Number: #1   Ankle-Brachial Index: paul done 2/17/2023 = right dp = 1.05, pt = 1.02   Pulses: + palpable x 4, + doppler, right  bi phasic x2,   left= biphasic x 2   Removal Indication and Assessment:    Wound Outcome:    (Retired) Wound Type:    (Retired) Wound Length (cm):    (Retired) Wound Width (cm):    (Retired) Depth (cm):    Wound  Description (Comments):    Removal Indications:    Wound Image    02/24/23 0816   Dressing Appearance Intact;Moist drainage 02/24/23 0816   Drainage Amount Moderate 02/24/23 0816   Drainage Characteristics/Odor Yellow;Serosanguineous 02/24/23 0816   Appearance Pink 02/24/23 0816   Tissue loss description Full thickness 02/24/23 0816   Black (%), Wound Tissue Color 0 % 02/24/23 0816   Red (%), Wound Tissue Color 100 % 02/24/23 0816   Yellow (%), Wound Tissue Color 0 % 02/24/23 0816   Periwound Area Intact 02/24/23 0816   Wound Edges Callused;Defined 02/24/23 0816   Wound Length (cm) 1 cm 02/24/23 0816   Wound Width (cm) 0.8 cm 02/24/23 0816   Wound Depth (cm) 0.3 cm 02/24/23 0816   Wound Volume (cm^3) 0.24 cm^3 02/24/23 0816   Wound Surface Area (cm^2) 0.8 cm^2 02/24/23 0816   Care Cleansed with:;Soap and water;Wound cleanser;Applied: 02/24/23 0816   Dressing Removed;Changed;Calcium alginate;Methylene blue/gentian violet;Silver;Absorptive Pad;Rolled gauze 02/24/23 0816               Assessment:       1. Ulcer of right great toe due to diabetes mellitus    2. Type 2 diabetes mellitus with foot ulcer, without long-term current use of insulin    3. Uncontrolled type 2 diabetes mellitus with hyperglycemia    4. Diabetic polyneuropathy associated with type 2 diabetes mellitus    5. Cellulitis of great toe of right foot    6. Staph aureus infection    7. Group B streptococcal infection    8. Enterococcus faecalis infection    9. Morbid obesity         Right hallux DFU: first clinic visit 5/19/22, neg xray: recalcitrant to healing complicated by his noncompliance and clinic absence; deterioration noted on 11/22/22 with toe cellulitis: rx bactrim and augmentin and then 2 weeks of Cipro prescribed on 12/13/2022/ +tissue cx for Enterococcus on 1/3/23: rx augmentin x 4-6 weeks total  recalcitrant  Right foot MRI 10/5/22: no osteomyelitis  Prior left 2nd toe osteomyelitis with partial amputation by Dr CAMERON driscoll 2020, New ulcer  left 2nd toe volar side: noted 6/9/22: closed    diabetes,uncontrolled:  hba1c 9.1 April 2022,  7.3 July 2022 up to 11.1 Dec 2022  Morbid obesity with bmi 54  Hypertension   Hyperlipidemia  H/o depression/bipolar  Medical noncompliance   Latest Reference Range & Units 12/30/22 09:20   Sed Rate 0 - 15 mm/hr 18 (H)   Prealbumin 18.0 - 45.0 mg/dL 27.5   CRP <5.00 mg/L 9.90 (H)   Hemoglobin A1C External <=7.0 % 11.1 (H)   Estimated Avg Glucose mg/dL 271.9   (H): Data is abnormally high  EXAMINATION:  XR TOE 2 OR MORE VIEWS RIGHT     CLINICAL HISTORY:  DFU right great toe/;     TECHNIQUE:  Three views of the right 1st toe     COMPARISON:  Radiographs 05/25/2022     FINDINGS:  Skin ulcer along the plantar aspect of the 1st toe.  No defined areas of osteolysis in this region.  No tracking subcutaneous gas.  Joint alignments are maintained.  No acute fracture.     Impression:     No acute osseous process appreciated.        Electronically signed by: Franko Valenzuela  Date:                                            12/30/2022  Time:                                           10:02    Patient brought me labs dated 12/09/2022  Cholesterol 271, triglycerides 587, , glucose 254, creatinine 0.98, liver enzymes normal, H/H 11/35    Cx 1/13/23  Aerobic Culture - Tissue Few Enterococcus faecalis Abnormal     with normal skin corey   Ampicillin results may be used to predict susceptibility to amoxicillin-clavulanate, ampicillin-sulbactam, and piperacillin-tazobactam.        Resulting Agency: [facility]     Susceptibility     Enterococcus faecalis     Not Specified     Ampicillin <=2  Sensitive     Gentamicin Synergy Screen SYN-R  Resistant     Penicillin 2  Sensitive     Streptomycin Synergy SYN-S  Sensitive                       Plan:         Plan of Care:    Wound was assessed  Wound was debrided today.   Getting a slit again in center; will monitor  Wound Care Orders: paint periwound with Gentian violet; dressings of honey; he  alternates with collage /alginate at home; his wife does the care daily              Offloading: must offload the wound to potentiate wound healing: use darco shoe, rolling knee scooter, ROHO/offloading cushion , MERNA mattress with frequent turning/standing;  Nutrition: Must have a high protein diet to support wound  healing; (if renal disease, see nephrologist for amount allowed):  this should be over 100g protein /day (if no kidney issues); Also rec MVI along with vit C, vit D, zinc and Sidney  Diabetes: must have a strict diabetic diet, take meds and encourage lower hba1c His DM is very uncontrolled and always hyperglycemic; PCP aware and pt aware he must do better

## 2023-03-03 ENCOUNTER — HOSPITAL ENCOUNTER (OUTPATIENT)
Dept: WOUND CARE | Facility: HOSPITAL | Age: 53
Discharge: HOME | End: 2023-03-03
Attending: EMERGENCY MEDICINE
Payer: MEDICARE

## 2023-03-03 VITALS
SYSTOLIC BLOOD PRESSURE: 150 MMHG | TEMPERATURE: 98 F | DIASTOLIC BLOOD PRESSURE: 83 MMHG | BODY MASS INDEX: 45.1 KG/M2 | HEIGHT: 70 IN | HEART RATE: 91 BPM | RESPIRATION RATE: 20 BRPM | WEIGHT: 315 LBS

## 2023-03-03 DIAGNOSIS — L97.509 TYPE 2 DIABETES MELLITUS WITH FOOT ULCER, WITHOUT LONG-TERM CURRENT USE OF INSULIN: ICD-10-CM

## 2023-03-03 DIAGNOSIS — A49.8 ENTEROCOCCUS FAECALIS INFECTION: ICD-10-CM

## 2023-03-03 DIAGNOSIS — L97.519 ULCER OF RIGHT GREAT TOE DUE TO DIABETES MELLITUS: Primary | ICD-10-CM

## 2023-03-03 DIAGNOSIS — E11.621 ULCER OF RIGHT GREAT TOE DUE TO DIABETES MELLITUS: Primary | ICD-10-CM

## 2023-03-03 DIAGNOSIS — E11.65 UNCONTROLLED TYPE 2 DIABETES MELLITUS WITH HYPERGLYCEMIA: ICD-10-CM

## 2023-03-03 DIAGNOSIS — E11.621 TYPE 2 DIABETES MELLITUS WITH FOOT ULCER, WITHOUT LONG-TERM CURRENT USE OF INSULIN: ICD-10-CM

## 2023-03-03 DIAGNOSIS — E66.01 MORBID OBESITY: ICD-10-CM

## 2023-03-03 DIAGNOSIS — E11.42 DIABETIC POLYNEUROPATHY ASSOCIATED WITH TYPE 2 DIABETES MELLITUS: ICD-10-CM

## 2023-03-03 DIAGNOSIS — A49.01 STAPH AUREUS INFECTION: ICD-10-CM

## 2023-03-03 LAB — POCT GLUCOSE: 334 MG/DL (ref 70–110)

## 2023-03-03 PROCEDURE — 11042 DEBRIDEMENT: ICD-10-PCS | Mod: ,,, | Performed by: EMERGENCY MEDICINE

## 2023-03-03 PROCEDURE — 27000999 HC MEDICAL RECORD PHOTO DOCUMENTATION

## 2023-03-03 PROCEDURE — 11042 DBRDMT SUBQ TIS 1ST 20SQCM/<: CPT | Mod: ,,, | Performed by: EMERGENCY MEDICINE

## 2023-03-03 PROCEDURE — 99499 UNLISTED E&M SERVICE: CPT | Mod: ,,, | Performed by: EMERGENCY MEDICINE

## 2023-03-03 PROCEDURE — 11042 DBRDMT SUBQ TIS 1ST 20SQCM/<: CPT

## 2023-03-03 PROCEDURE — 99499 NO LOS: ICD-10-PCS | Mod: ,,, | Performed by: EMERGENCY MEDICINE

## 2023-03-03 RX ORDER — INSULIN ASPART 100 [IU]/ML
INJECTION, SOLUTION INTRAVENOUS; SUBCUTANEOUS
COMMUNITY

## 2023-03-03 NOTE — PATIENT INSTRUCTIONS
Pt seen today by:     Self care DRESSING INSTRUCTIONS:      Wound location: Right Great Toe       Dressings to be changed daily or every other day and as needed if soiled or not intact.  Cleanse wound with wound cleanser or saline  Cover with calcium alginate silver  May alternate wound care with use of therahoney gel  Cover with abd pad and secure with a shaka/cover roll tape  Wear Darco shoe    Return to clinic on Friday March 10th 2023 at 8am     Wound may have been debrided in clinic: if so, WHAT YOU NEED TO KNOW:    Debridement is the removal of infected, damaged, or dead tissue so a wound can heal properly. Your wound may need more than one debridement. Debridement can cause bleeding, and a small amount of blood is expected.  AFTER A DEBRIDEMENT:  Keep your wound clean and dry. Do not remove the dressing unless instructed.  Follow the wound care orders provided to you or your home health care provider.  If you have pain, take over the counter pain relievers or pain medication if prescribed.  Elevate the wound and limit excessive activity to prevent bleeding and/or swelling in your wound.  If you see blood coming through the dressing, apply gauze and tape over the dressing and hold firm pressure to the wound with your hand for 5-10 minutes continuously, without peeking, to help the bleeding stop.  Contact [facility] wound care team at [phone] or go to the nearest Emergency department if:  You have a fever greater than 101 taken by mouth.  Your pain gets worse or does not go away, even after taking your regular pain medicine.  Your skin around your wound is red, hot, swollen, or draining pus.  You have bleeding that continues to come through the dressing after holding pressure for 10 minutes   The current daily value (%DV) for protein is 50 grams per day and is meant as a general goal for most people. Further increasing your dietary protein intake is very important for wound healing. Typically one  needs over 100g of protein per day to help with wound healing needs.  If you are a dialysis patient or have problems with your kidneys, talk to your Nephrologist about how much protein you can take in with your condition.  Examples of high protein items that can be added to your diet include: eggs, chicken, red meats, almonds, cottage cheese, Greek yogurt, beans, and peanut butter.  Fortified protein bars, shakes and drinks can add 15-30 additional grams of protein per serving.   Also add:   1 daily general multivitamin   Sidney : 1 packet twice daily   Vitamin C : 500mg twice daily   Zinc 220 mg daily  Vit D : once daily    Call our [facility] wound clinic for questions/concerns a [phone] .

## 2023-03-03 NOTE — PROCEDURES
"Debridement    Date/Time: 3/3/2023 7:48 AM  Performed by: Soheila Vidal MD  Authorized by: Soheila Vidal MD   Associated wounds:        (Retired) Wound 11/22/22 1053 Diabetic Ulcer Right medial;plantar Toe, first #1  Time out: Immediately prior to procedure a "time out" was called to verify the correct patient, procedure, equipment, support staff and site/side marked as required.    Consent Done?:  Yes (Written)    Preparation: Patient was prepped and draped in usual sterile fashion    Local anesthesia used?: Yes    Local anesthetic:  Topical anesthetic    Wound Details:    Location:  Right foot    Type of Debridement:  Excisional       Length (cm):  0.9       Area (sq cm):  0.63       Width (cm):  0.7       Percent Debrided (%):  100       Depth (cm):  0.4       Total Area Debrided (sq cm):  0.63    Depth of debridement:  Subcutaneous tissue    Tissue debrided:  Dermis, Epidermis and Subcutaneous    Devitalized tissue debrided:  Biofilm and Slough    Instruments:  Curette    Bleeding:  Minimal  Hemostasis Achieved: Yes    Method Used:  Pressure  Patient tolerance:  Patient tolerated the procedure well with no immediate complications  "

## 2023-03-23 ENCOUNTER — HOSPITAL ENCOUNTER (OUTPATIENT)
Dept: WOUND CARE | Facility: HOSPITAL | Age: 53
Discharge: HOME | End: 2023-03-23
Attending: EMERGENCY MEDICINE
Payer: MEDICARE

## 2023-03-23 VITALS
HEART RATE: 103 BPM | BODY MASS INDEX: 45.1 KG/M2 | TEMPERATURE: 98 F | WEIGHT: 315 LBS | SYSTOLIC BLOOD PRESSURE: 138 MMHG | DIASTOLIC BLOOD PRESSURE: 91 MMHG | RESPIRATION RATE: 22 BRPM | HEIGHT: 70 IN

## 2023-03-23 DIAGNOSIS — E11.65 UNCONTROLLED TYPE 2 DIABETES MELLITUS WITH HYPERGLYCEMIA: ICD-10-CM

## 2023-03-23 DIAGNOSIS — E11.42 DIABETIC POLYNEUROPATHY ASSOCIATED WITH TYPE 2 DIABETES MELLITUS: ICD-10-CM

## 2023-03-23 DIAGNOSIS — E66.01 MORBID OBESITY: ICD-10-CM

## 2023-03-23 DIAGNOSIS — E11.621 TYPE 2 DIABETES MELLITUS WITH FOOT ULCER, WITHOUT LONG-TERM CURRENT USE OF INSULIN: ICD-10-CM

## 2023-03-23 DIAGNOSIS — L97.519 ULCER OF RIGHT GREAT TOE DUE TO DIABETES MELLITUS: Primary | ICD-10-CM

## 2023-03-23 DIAGNOSIS — A49.8 ENTEROCOCCUS FAECALIS INFECTION: ICD-10-CM

## 2023-03-23 DIAGNOSIS — A49.01 STAPH AUREUS INFECTION: ICD-10-CM

## 2023-03-23 DIAGNOSIS — E11.621 ULCER OF RIGHT GREAT TOE DUE TO DIABETES MELLITUS: Primary | ICD-10-CM

## 2023-03-23 DIAGNOSIS — L97.509 TYPE 2 DIABETES MELLITUS WITH FOOT ULCER, WITHOUT LONG-TERM CURRENT USE OF INSULIN: ICD-10-CM

## 2023-03-23 LAB — POCT GLUCOSE: 294 MG/DL (ref 70–110)

## 2023-03-23 PROCEDURE — 11042 DEBRIDEMENT: ICD-10-PCS | Mod: ,,, | Performed by: EMERGENCY MEDICINE

## 2023-03-23 PROCEDURE — 99499 UNLISTED E&M SERVICE: CPT | Mod: ,,, | Performed by: EMERGENCY MEDICINE

## 2023-03-23 PROCEDURE — 11042 DBRDMT SUBQ TIS 1ST 20SQCM/<: CPT | Mod: ,,, | Performed by: EMERGENCY MEDICINE

## 2023-03-23 PROCEDURE — 11042 DBRDMT SUBQ TIS 1ST 20SQCM/<: CPT

## 2023-03-23 PROCEDURE — 99499 NO LOS: ICD-10-PCS | Mod: ,,, | Performed by: EMERGENCY MEDICINE

## 2023-03-23 PROCEDURE — 27000999 HC MEDICAL RECORD PHOTO DOCUMENTATION

## 2023-03-23 NOTE — PROCEDURES
"Debridement    Date/Time: 3/23/2023 7:57 AM  Performed by: Soheila Vidal MD  Authorized by: Soheila Vidal MD   Associated wounds:        (Retired) Wound 11/22/22 1053 Diabetic Ulcer Right medial;plantar Toe, first #1  Time out: Immediately prior to procedure a "time out" was called to verify the correct patient, procedure, equipment, support staff and site/side marked as required.    Consent Done?:  Yes (Written)  Local anesthesia used?: Yes    Local anesthetic:  Topical anesthetic    Wound Details:    Location:  Right foot    Location:  Right 1st Toe    Type of Debridement:  Excisional       Length (cm):  1       Area (sq cm):  0.6       Width (cm):  0.6       Percent Debrided (%):  100       Depth (cm):  0.4       Total Area Debrided (sq cm):  0.6    Depth of debridement:  Subcutaneous tissue    Tissue debrided:  Dermis, Epidermis and Subcutaneous    Devitalized tissue debrided:  Biofilm and Slough    Instruments:  Curette  Bleeding:  Minimal  Hemostasis Achieved: Yes  Method Used:  Pressure  Patient tolerance:  Patient tolerated the procedure well with no immediate complications  "

## 2023-03-23 NOTE — PROGRESS NOTES
Subjective:       Patient ID: Jong Clayton is a 52 y.o. male.    Chief Complaint: No chief complaint on file.      53 y/o  WM with morbid obesity, diabetes, neuropathy hypertension, dyslipidemia, chronic back pain, bipolar and depression who has battled various DFU's with osteomyelitis (left 2nd toe 2019 and again 2020). I have treated him in past for this left 2nd toe and noted then he was noncompliant with my recommendations. I last saw him for that left 2nd toe in March 2020 where he had a bulbous left 2nd hammer toe with +MRI for osteo. He never returned as scheduled but ultimately followed through with my prior recs of distal tip amputation.    He returned to clinic in mid May 2022 complaining of a chronic untreated right plantar great toe ulcer which began in Dec 2021 that he tried to self treat. His wife finally got him to agree to come to clinic but unfortunately he continues to demonstrate noncompliance along with ongoing uncontrolled diabetes with self admitted poor diet . In addition, we are limited in our treatment options because of his insurance:   For example they refused my request for IV and/or IM antibiotics in fall of 2022).  Back in the summer I ordered an MRI of this right big toe which took him months to get done (but finally had it in October and it did not demonstrate osteomyelitis).  Patient came into clinic at  the end of November after a >1month absence with a significant decline in the right toe wound with evidence of bruising . I prescribed 2 antibiotics: bactrim and augmentin. Cellulitis and periwound blistering resolved but left with a larger DFU that is not getting better. I placed on more antibiotics :  most recently augmentin for +Enterococcus cx. His most recent  HBa1c over 11.  He is at high-risk for needing this toe amputated.  Trying to get IV antibiotics again but can't afford copay on what insurance will allow. He has completed over one month of  Augmentin for +cx of  enterococcus.  He was last here on 3/3/23: missed next 2 consecutive appointments because of various reported issues. Says still has ongoing hyperglycemia (alejandra, PCP managing).  Wife does wound care.       Review of Systems   Constitutional:  Positive for fatigue. Negative for chills and fever.   HENT: Negative.     Respiratory: Negative.     Cardiovascular: Negative.    Gastrointestinal: Negative.    Musculoskeletal:  Positive for back pain.   Skin:  Positive for wound (see hpi).   Neurological:  Positive for numbness (to both feet). Negative for tremors, seizures, syncope and speech difficulty.       Objective:       Vitals:    03/23/23 0813   BP: (!) 138/91   Pulse: 103   Resp: (!) 22   Temp: 98.1 °F (36.7 °C)       Recent Labs   Lab 03/23/23  0815   POCTGLUCOSE 294*        Physical Exam  Constitutional:       Appearance: He is morbidly obese.      Comments: He looks like he is putting on weight still   HENT:      Head: Normocephalic and atraumatic.   Eyes:      Conjunctiva/sclera: Conjunctivae normal.   Cardiovascular:      Pulses: Normal pulses.           Dorsalis pedis pulses are 2+ on the right side and 2+ on the left side.      Comments: +hair on legs  Musculoskeletal:      Right lower leg: No edema.      Left lower leg: No edema.        Feet:    Feet:      Comments: Hallux valgus deformity noted  Skin:     General: Skin is warm.      Capillary Refill: Capillary refill takes less than 2 seconds.   Neurological:      General: No focal deficit present.      Mental Status: He is alert and oriented to person, place, and time. Mental status is at baseline.            (Retired) Wound 11/22/22 1053 Diabetic Ulcer Right medial;plantar Toe, first #1 (Active)   11/22/22 1053    Pre-existing: Yes   Primary Wound Type: Diabetic [facility]   Side: Right   Orientation: medial;plantar   Location: Toe, first   Wound Number: #1   Ankle-Brachial Index: paul done 3/23/2023 = right dp = 1.38, pt = 1.01   Pulses: + palpable x 4, +  doppler, right DP bi phasic  PT=bi phasic,   left= biphasic x 2   Removal Indication and Assessment:    Wound Outcome:    (Retired) Wound Type:    (Retired) Wound Length (cm):    (Retired) Wound Width (cm):    (Retired) Depth (cm):    Wound Description (Comments):    Removal Indications:    Wound Image    03/23/23 0831   Dressing Appearance Moist drainage 03/23/23 0831   Drainage Amount Moderate 03/23/23 0831   Drainage Characteristics/Odor Purulent 03/23/23 0831   Appearance Pink 03/23/23 0831   Black (%), Wound Tissue Color 0 % 03/23/23 0831   Red (%), Wound Tissue Color 100 % 03/23/23 0831   Yellow (%), Wound Tissue Color 0 % 03/23/23 0831   Periwound Area Intact 03/23/23 0831   Wound Edges Defined;Callused 03/23/23 0831   Wound Length (cm) 1 cm 03/23/23 0831   Wound Width (cm) 0.6 cm 03/23/23 0831   Wound Depth (cm) 0.4 cm 03/23/23 0831   Wound Volume (cm^3) 0.24 cm^3 03/23/23 0831   Wound Surface Area (cm^2) 0.6 cm^2 03/23/23 0831   Care Cleansed with:;Wound cleanser;Applied: 03/23/23 0831   Dressing Applied;Calcium alginate;Silver;Absorptive Pad;Rolled gauze 03/23/23 0831               Assessment:       1. Ulcer of right great toe due to diabetes mellitus    2. Type 2 diabetes mellitus with foot ulcer, without long-term current use of insulin    3. Uncontrolled type 2 diabetes mellitus with hyperglycemia    4. Diabetic polyneuropathy associated with type 2 diabetes mellitus    5. Staph aureus infection    6. Enterococcus faecalis infection    7. Morbid obesity         Right hallux DFU: first clinic visit 5/19/22, neg xray: recalcitrant to healing complicated by his noncompliance, ongoing uncontrolled DM and frequent missed clinic visits;  deterioration late Nov 2022 with toe cellulitis: rx bactrim and augmentin and then 2 weeks of Cipro prescribed on 12/13/2022/ +tissue cx for Enterococcus on 1/3/23: rx augmentin x 4-6 weeks total : remains recalcitrant  Right foot MRI 10/5/22: no osteomyelitis  Prior left 2nd  toe osteomyelitis with partial amputation 2020   diabetes,uncontrolled:  hba1c 9.1 April 2022,  7.3 July 2022 up to 11.1 Dec 2022  Morbid obesity with bmi 57, he is gaining weight (was54)  Hypertension   Hyperlipidemia  H/o depression/bipolar  Medical noncompliance   Latest Reference Range & Units 12/30/22 09:20   Sed Rate 0 - 15 mm/hr 18 (H)   Prealbumin 18.0 - 45.0 mg/dL 27.5   CRP <5.00 mg/L 9.90 (H)   Hemoglobin A1C External <=7.0 % 11.1 (H)   Estimated Avg Glucose mg/dL 271.9   (H): Data is abnormally high  EXAMINATION:  XR TOE 2 OR MORE VIEWS RIGHT     CLINICAL HISTORY:  DFU right great toe/;     TECHNIQUE:  Three views of the right 1st toe     COMPARISON:  Radiographs 05/25/2022     FINDINGS:  Skin ulcer along the plantar aspect of the 1st toe.  No defined areas of osteolysis in this region.  No tracking subcutaneous gas.  Joint alignments are maintained.  No acute fracture.     Impression:     No acute osseous process appreciated.        Electronically signed by: Franko Valenzuela  Date:                                            12/30/2022  Time:                                           10:02    Patient brought me labs dated 12/09/2022  Cholesterol 271, triglycerides 587, , glucose 254, creatinine 0.98, liver enzymes normal, H/H 11/35    Cx 1/13/23  Aerobic Culture - Tissue Few Enterococcus faecalis Abnormal     with normal skin corey   Ampicillin results may be used to predict susceptibility to amoxicillin-clavulanate, ampicillin-sulbactam, and piperacillin-tazobactam.        Resulting Agency: [facility]     Susceptibility     Enterococcus faecalis     Not Specified     Ampicillin <=2  Sensitive     Gentamicin Synergy Screen SYN-R  Resistant     Penicillin 2  Sensitive     Streptomycin Synergy SYN-S  Sensitive                       Plan:         Plan of Care:    Wound was assessed and debrided today.   Wound Care Orders:  dressings of honey; he alternates with collagen /alginate at home; his wife does  the care daily              Offloading: must offload the wound to potentiate wound healing: use darco shoe, rolling knee scooter, ROHO/offloading cushion , MERNA mattress with frequent turning/standing;  Nutrition: Must have a high protein diet to support wound  healing; (if renal disease, see nephrologist for amount allowed):  this should be over 100g protein /day (if no kidney issues); Also rec MVI along with vit C, vit D, zinc and Sidney  Diabetes: must have a strict diabetic diet, take meds and encourage lower hba1c His DM is very uncontrolled and always hyperglycemic; PCP aware and pt aware he must do better  Prognosis for healing very low; I would not be surprised if needs amputation for resolution . I have discussed this with him

## 2023-03-23 NOTE — PATIENT INSTRUCTIONS
Pt seen today by:     Self care DRESSING INSTRUCTIONS:      Wound location: Right Great Toe       Dressings to be changed daily or every other day and as needed if soiled or not intact.  Cleanse wound with wound cleanser or saline  Cover with calcium alginate silver  May alternate wound care with use of therahoney gel  Cover with abd pad and secure with a shaka/cover roll tape  Wear Darco shoe    Return to clinic on Friday March 31st 2023 at 8am     Wound may have been debrided in clinic: if so, WHAT YOU NEED TO KNOW:    Debridement is the removal of infected, damaged, or dead tissue so a wound can heal properly. Your wound may need more than one debridement. Debridement can cause bleeding, and a small amount of blood is expected.  AFTER A DEBRIDEMENT:  Keep your wound clean and dry. Do not remove the dressing unless instructed.  Follow the wound care orders provided to you or your home health care provider.  If you have pain, take over the counter pain relievers or pain medication if prescribed.  Elevate the wound and limit excessive activity to prevent bleeding and/or swelling in your wound.  If you see blood coming through the dressing, apply gauze and tape over the dressing and hold firm pressure to the wound with your hand for 5-10 minutes continuously, without peeking, to help the bleeding stop.  Contact [facility] wound care team at [phone] or go to the nearest Emergency department if:  You have a fever greater than 101 taken by mouth.  Your pain gets worse or does not go away, even after taking your regular pain medicine.  Your skin around your wound is red, hot, swollen, or draining pus.  You have bleeding that continues to come through the dressing after holding pressure for 10 minutes   The current daily value (%DV) for protein is 50 grams per day and is meant as a general goal for most people. Further increasing your dietary protein intake is very important for wound healing. Typically one  needs over 100g of protein per day to help with wound healing needs.  If you are a dialysis patient or have problems with your kidneys, talk to your Nephrologist about how much protein you can take in with your condition.  Examples of high protein items that can be added to your diet include: eggs, chicken, red meats, almonds, cottage cheese, Greek yogurt, beans, and peanut butter.  Fortified protein bars, shakes and drinks can add 15-30 additional grams of protein per serving.   Also add:   1 daily general multivitamin   Sidney : 1 packet twice daily   Vitamin C : 500mg twice daily   Zinc 220 mg daily  Vit D : once daily    Call our [facility] wound clinic for questions/concerns a [phone] .

## 2023-04-18 ENCOUNTER — HOSPITAL ENCOUNTER (OUTPATIENT)
Dept: WOUND CARE | Facility: HOSPITAL | Age: 53
Discharge: HOME | End: 2023-04-18
Attending: EMERGENCY MEDICINE
Payer: MEDICARE

## 2023-04-18 VITALS
HEART RATE: 84 BPM | RESPIRATION RATE: 18 BRPM | HEIGHT: 70 IN | DIASTOLIC BLOOD PRESSURE: 61 MMHG | WEIGHT: 315 LBS | BODY MASS INDEX: 45.1 KG/M2 | TEMPERATURE: 98 F | SYSTOLIC BLOOD PRESSURE: 104 MMHG

## 2023-04-18 DIAGNOSIS — E11.621 TYPE 2 DIABETES MELLITUS WITH FOOT ULCER, WITHOUT LONG-TERM CURRENT USE OF INSULIN: ICD-10-CM

## 2023-04-18 DIAGNOSIS — L97.519 ULCER OF RIGHT GREAT TOE DUE TO DIABETES MELLITUS: Primary | ICD-10-CM

## 2023-04-18 DIAGNOSIS — L97.509 TYPE 2 DIABETES MELLITUS WITH FOOT ULCER, WITHOUT LONG-TERM CURRENT USE OF INSULIN: ICD-10-CM

## 2023-04-18 DIAGNOSIS — E11.42 DIABETIC POLYNEUROPATHY ASSOCIATED WITH TYPE 2 DIABETES MELLITUS: ICD-10-CM

## 2023-04-18 DIAGNOSIS — E11.621 ULCER OF RIGHT GREAT TOE DUE TO DIABETES MELLITUS: Primary | ICD-10-CM

## 2023-04-18 DIAGNOSIS — Z91.199 MEDICALLY NONCOMPLIANT: ICD-10-CM

## 2023-04-18 DIAGNOSIS — L03.031 CELLULITIS OF GREAT TOE OF RIGHT FOOT: ICD-10-CM

## 2023-04-18 DIAGNOSIS — A49.1 GROUP B STREPTOCOCCAL INFECTION: ICD-10-CM

## 2023-04-18 DIAGNOSIS — F31.81 BIPOLAR II DISORDER: ICD-10-CM

## 2023-04-18 DIAGNOSIS — A49.8 ENTEROCOCCUS FAECALIS INFECTION: ICD-10-CM

## 2023-04-18 DIAGNOSIS — A49.01 STAPH AUREUS INFECTION: ICD-10-CM

## 2023-04-18 DIAGNOSIS — E11.65 UNCONTROLLED TYPE 2 DIABETES MELLITUS WITH HYPERGLYCEMIA: ICD-10-CM

## 2023-04-18 DIAGNOSIS — E66.01 MORBID OBESITY: ICD-10-CM

## 2023-04-18 PROCEDURE — 99499 UNLISTED E&M SERVICE: CPT | Mod: ,,, | Performed by: EMERGENCY MEDICINE

## 2023-04-18 PROCEDURE — 11042 DBRDMT SUBQ TIS 1ST 20SQCM/<: CPT | Mod: ,,, | Performed by: EMERGENCY MEDICINE

## 2023-04-18 PROCEDURE — 99499 NO LOS: ICD-10-PCS | Mod: ,,, | Performed by: EMERGENCY MEDICINE

## 2023-04-18 PROCEDURE — 27000999 HC MEDICAL RECORD PHOTO DOCUMENTATION

## 2023-04-18 PROCEDURE — 11042 DBRDMT SUBQ TIS 1ST 20SQCM/<: CPT

## 2023-04-18 PROCEDURE — 11042 DEBRIDEMENT: ICD-10-PCS | Mod: ,,, | Performed by: EMERGENCY MEDICINE

## 2023-04-18 RX ORDER — ICOSAPENT ETHYL 1000 MG/1
1 CAPSULE ORAL
COMMUNITY
Start: 2023-04-13

## 2023-04-18 RX ORDER — OMEGA-3-ACID ETHYL ESTERS 1 G/1
2 CAPSULE, LIQUID FILLED ORAL 2 TIMES DAILY
COMMUNITY
Start: 2023-04-13

## 2023-04-18 NOTE — PROCEDURES
"Debridement    Date/Time: 4/18/2023 8:47 AM  Performed by: Soheila Vidal MD  Authorized by: Soheila Vidal MD   Associated wounds:        (Retired) Wound 04/18/23 0918 Diabetic Ulcer Right medial;plantar Toe, first #1  Time out: Immediately prior to procedure a "time out" was called to verify the correct patient, procedure, equipment, support staff and site/side marked as required.    Consent Done?:  Yes (Written)    Preparation: Patient was prepped and draped in usual sterile fashion    Local anesthesia used?: Yes    Local anesthetic:  Topical anesthetic    Wound Details:    Location:  Right foot    Location:  Right 1st Toe    Type of Debridement:  Excisional       Length (cm):  1       Area (sq cm):  0.7       Width (cm):  0.7       Percent Debrided (%):  100       Depth (cm):  0.3       Total Area Debrided (sq cm):  0.7    Depth of debridement:  Subcutaneous tissue    Tissue debrided:  Epidermis, Dermis and Subcutaneous    Devitalized tissue debrided:  Biofilm and Slough    Instruments:  Curette  Bleeding:  Moderate  Hemostasis Achieved: Yes  Method Used:  Pressure and Silver Nitrate  Patient tolerance:  Patient tolerated the procedure well with no immediate complications  "

## 2023-04-18 NOTE — PATIENT INSTRUCTIONS
Pt seen today by:     Self care DRESSING INSTRUCTIONS:      Wound location: Right Great Toe       Dressings to be changed daily or every other day and as needed if soiled or not intact.  Cleanse wound with wound cleanser or saline  Cover with calcium alginate silver  May alternate wound care with use of therahoney gel  Cover with abd pad and secure with a shaka/cover roll tape  Wear Darco shoe    Return to clinic on Friday April 28,2023 at 8am     Wound may have been debrided in clinic: if so, WHAT YOU NEED TO KNOW:    Debridement is the removal of infected, damaged, or dead tissue so a wound can heal properly. Your wound may need more than one debridement. Debridement can cause bleeding, and a small amount of blood is expected.  AFTER A DEBRIDEMENT:  Keep your wound clean and dry. Do not remove the dressing unless instructed.  Follow the wound care orders provided to you or your home health care provider.  If you have pain, take over the counter pain relievers or pain medication if prescribed.  Elevate the wound and limit excessive activity to prevent bleeding and/or swelling in your wound.  If you see blood coming through the dressing, apply gauze and tape over the dressing and hold firm pressure to the wound with your hand for 5-10 minutes continuously, without peeking, to help the bleeding stop.  Contact [facility] wound care team at [phone] or go to the nearest Emergency department if:  You have a fever greater than 101 taken by mouth.  Your pain gets worse or does not go away, even after taking your regular pain medicine.  Your skin around your wound is red, hot, swollen, or draining pus.  You have bleeding that continues to come through the dressing after holding pressure for 10 minutes   The current daily value (%DV) for protein is 50 grams per day and is meant as a general goal for most people. Further increasing your dietary protein intake is very important for wound healing. Typically one  needs over 100g of protein per day to help with wound healing needs.  If you are a dialysis patient or have problems with your kidneys, talk to your Nephrologist about how much protein you can take in with your condition.  Examples of high protein items that can be added to your diet include: eggs, chicken, red meats, almonds, cottage cheese, Greek yogurt, beans, and peanut butter.  Fortified protein bars, shakes and drinks can add 15-30 additional grams of protein per serving.   Also add:   1 daily general multivitamin   Sidney : 1 packet twice daily   Vitamin C : 500mg twice daily   Zinc 220 mg daily  Vit D : once daily    Call our [facility] wound clinic for questions/concerns a [phone] .

## 2023-04-18 NOTE — PROGRESS NOTES
Subjective:       Patient ID: Jong Clayton is a 52 y.o. male.    Chief Complaint: No chief complaint on file.      51 y/o  WM with morbid obesity, diabetes, neuropathy hypertension, dyslipidemia, chronic back pain, bipolar and depression who has battled various DFU's with osteomyelitis (left 2nd toe 2019 and again 2020). I have treated him in past for this left 2nd toe and noted then he was noncompliant with my recommendations. I last saw him for that left 2nd toe in March 2020 where he had a bulbous left 2nd hammer toe with +MRI for osteo. He never returned as scheduled but ultimately followed through with my prior recs of distal tip amputation.    He returned to clinic in mid May 2022 complaining of a chronic untreated right plantar great toe ulcer which began in Dec 2021 that he tried to self treat. His wife finally got him to agree to come to clinic but unfortunately he continues to demonstrate noncompliance along with ongoing uncontrolled diabetes with self admitted poor diet . In addition, we are limited in our treatment options because of his insurance:   For example they refused my request for IV and/or IM antibiotics in fall of 2022).  Back in the summer I ordered an MRI of this right big toe which took him months to get done (but finally had it in October and it did not demonstrate osteomyelitis).  Patient came into clinic at  the end of November after a >1month absence with a significant decline in the right toe wound with evidence of bruising . I prescribed 2 antibiotics: bactrim and augmentin. Cellulitis and periwound blistering resolved but left with a larger DFU. I placed on more antibiotics :   augmentin for +Enterococcus cx. His most recent  HBa1c over 11.  He is at high-risk for needing this toe amputated.  Trying to get IV antibiotics again but can't afford copay on what insurance will allow. He  completed over one month of  Augmentin for +cx of enterococcus.  He has been very inconsistent with  keeping his appointments here : after 3/3/23, didn't return until 3/23/23 and now hasn't returned until today on 4/18/23. Says his back has been giving him trouble. Wife continues to do wound care      Review of Systems   Constitutional:  Positive for fatigue. Negative for chills and fever.   HENT: Negative.     Respiratory: Negative.     Cardiovascular: Negative.    Gastrointestinal: Negative.    Musculoskeletal:  Positive for back pain.   Skin:  Positive for wound (see hpi).   Neurological:  Positive for numbness (to both feet). Negative for tremors, seizures, syncope and speech difficulty.       Objective:       Vitals:    04/18/23 0908   BP: 104/61   Pulse: 84   Resp: 18   Temp: 98.1 °F (36.7 °C)   Cbg over 300       Physical Exam  Constitutional:       Appearance: He is morbidly obese.      Comments: He looks like he is putting on weight still   HENT:      Head: Normocephalic and atraumatic.   Eyes:      Conjunctiva/sclera: Conjunctivae normal.   Cardiovascular:      Pulses: Normal pulses.           Dorsalis pedis pulses are 2+ on the right side and 2+ on the left side.      Comments: +hair on legs  Musculoskeletal:      Right lower leg: No edema.      Left lower leg: No edema.        Feet:    Feet:      Comments: Hallux valgus deformity noted  Skin:     General: Skin is warm.      Capillary Refill: Capillary refill takes less than 2 seconds.   Neurological:      General: No focal deficit present.      Mental Status: He is alert and oriented to person, place, and time. Mental status is at baseline.            (Retired) Wound 04/18/23 0918 Diabetic Ulcer Right medial;plantar Toe, first #1 (Active)   04/18/23 0918    Pre-existing: Yes   Primary Wound Type: Diabetic ulc   Side: Right   Orientation: medial;plantar   Location: Toe, first   Wound Number: #1   Ankle-Brachial Index: paul done 3/23/2023 = right dp = 1.38, pt = 1.01   Pulses: + palpable x 4, + doppler, right bi phasic x 2,   left= biphasic DP, tri phasic  PT   Removal Indication and Assessment:    Wound Outcome:    (Retired) Wound Type:    (Retired) Wound Length (cm):    (Retired) Wound Width (cm):    (Retired) Depth (cm):    Wound Description (Comments):    Removal Indications:    Wound Image   04/18/23 0924   Dressing Appearance Moist drainage 04/18/23 0924   Drainage Amount Small 04/18/23 0924   Drainage Characteristics/Odor Serosanguineous 04/18/23 0924   Appearance Red;Intact;Epithelialization 04/18/23 0924   Black (%), Wound Tissue Color 0 % 04/18/23 0924   Red (%), Wound Tissue Color 100 % 04/18/23 0924   Yellow (%), Wound Tissue Color 0 % 04/18/23 0924   Periwound Area Intact 04/18/23 0924   Wound Edges Callused;Defined 04/18/23 0924   Wound Length (cm) 1 cm 04/18/23 0924   Wound Width (cm) 0.7 cm 04/18/23 0924   Wound Depth (cm) 0.3 cm 04/18/23 0924   Wound Volume (cm^3) 0.21 cm^3 04/18/23 0924   Wound Surface Area (cm^2) 0.7 cm^2 04/18/23 0924   Care Cleansed with:;Wound cleanser;Applied: 04/18/23 0924               Assessment:       1. Ulcer of right great toe due to diabetes mellitus    2. Type 2 diabetes mellitus with foot ulcer, without long-term current use of insulin    3. Uncontrolled type 2 diabetes mellitus with hyperglycemia    4. Diabetic polyneuropathy associated with type 2 diabetes mellitus    5. Staph aureus infection    6. Enterococcus faecalis infection    7. Group B streptococcal infection    8. Cellulitis of great toe of right foot    9. Morbid obesity    10. BMI 50.0-59.9, adult    11. Medically noncompliant    12. Bipolar II disorder         Right hallux DFU: first clinic visit 5/19/22, neg xray: recalcitrant to healing complicated by his noncompliance, ongoing uncontrolled DM and frequent missed clinic visits;  deterioration late Nov 2022 with toe cellulitis: rx bactrim and augmentin and then 2 weeks of Cipro prescribed on 12/13/2022/ +tissue cx for Enterococcus on 1/3/23: rx augmentin x 4-6 weeks total : remains recalcitrant  Right  foot MRI 10/5/22: no osteomyelitis  Prior left 2nd toe osteomyelitis with partial amputation 2020   diabetes,uncontrolled:  hba1c 9.1 April 2022,  7.3 July 2022 up to 11.1 Dec 2022  Morbid obesity with bmi 57, he is gaining weight (was54)  Hypertension   Hyperlipidemia  H/o depression/bipolar  Medical noncompliance   Latest Reference Range & Units 12/30/22 09:20   Sed Rate 0 - 15 mm/hr 18 (H)   Prealbumin 18.0 - 45.0 mg/dL 27.5   CRP <5.00 mg/L 9.90 (H)   Hemoglobin A1C External <=7.0 % 11.1 (H)   Estimated Avg Glucose mg/dL 271.9         Plan:         Plan of Care:    Wound was assessed and debrided today.   He has not been here in one month but ulcer stable;   Wound Care Orders:  dressings of honey; he alternates with collagen /alginate at home; his wife does the care daily              Offloading: must offload the wound to potentiate wound healing: use darco shoe, rolling knee scooter, ROHO/offloading cushion , MERNA mattress with frequent turning/standing;  Nutrition: Must have a high protein diet to support wound  healing; (if renal disease, see nephrologist for amount allowed):  this should be over 100g protein /day (if no kidney issues); Also rec MVI along with vit C, vit D, zinc and Sidney  Diabetes: must have a strict diabetic diet, take meds and encourage lower hba1c His DM is very uncontrolled and always hyperglycemic; PCP aware and pt aware he must do better  Prognosis for healing very low; I would not be surprised if needs amputation for resolution . I have discussed this with him several times but  he still has hope it will heal;

## 2023-05-08 ENCOUNTER — HOSPITAL ENCOUNTER (OUTPATIENT)
Dept: WOUND CARE | Facility: HOSPITAL | Age: 53
Discharge: HOME | End: 2023-05-08
Attending: EMERGENCY MEDICINE
Payer: MEDICARE

## 2023-05-08 VITALS
WEIGHT: 315 LBS | BODY MASS INDEX: 45.1 KG/M2 | TEMPERATURE: 98 F | HEIGHT: 70 IN | HEART RATE: 81 BPM | DIASTOLIC BLOOD PRESSURE: 63 MMHG | RESPIRATION RATE: 22 BRPM | SYSTOLIC BLOOD PRESSURE: 123 MMHG

## 2023-05-08 DIAGNOSIS — E11.42 DIABETIC POLYNEUROPATHY ASSOCIATED WITH TYPE 2 DIABETES MELLITUS: ICD-10-CM

## 2023-05-08 DIAGNOSIS — L97.512 SKIN ULCER OF RIGHT GREAT TOE WITH FAT LAYER EXPOSED: ICD-10-CM

## 2023-05-08 DIAGNOSIS — A49.8 ENTEROCOCCUS FAECALIS INFECTION: ICD-10-CM

## 2023-05-08 DIAGNOSIS — E11.621 TYPE 2 DIABETES MELLITUS WITH FOOT ULCER, WITHOUT LONG-TERM CURRENT USE OF INSULIN: ICD-10-CM

## 2023-05-08 DIAGNOSIS — E11.621 ULCER OF RIGHT GREAT TOE DUE TO DIABETES MELLITUS: Primary | ICD-10-CM

## 2023-05-08 DIAGNOSIS — E11.65 UNCONTROLLED TYPE 2 DIABETES MELLITUS WITH HYPERGLYCEMIA: ICD-10-CM

## 2023-05-08 DIAGNOSIS — E66.01 MORBID OBESITY: ICD-10-CM

## 2023-05-08 DIAGNOSIS — A49.01 STAPH AUREUS INFECTION: ICD-10-CM

## 2023-05-08 DIAGNOSIS — L03.031 CELLULITIS OF GREAT TOE OF RIGHT FOOT: ICD-10-CM

## 2023-05-08 DIAGNOSIS — L97.519 ULCER OF RIGHT GREAT TOE DUE TO DIABETES MELLITUS: Primary | ICD-10-CM

## 2023-05-08 DIAGNOSIS — A49.1 GROUP B STREPTOCOCCAL INFECTION: ICD-10-CM

## 2023-05-08 DIAGNOSIS — L97.509 TYPE 2 DIABETES MELLITUS WITH FOOT ULCER, WITHOUT LONG-TERM CURRENT USE OF INSULIN: ICD-10-CM

## 2023-05-08 PROCEDURE — 11042 DBRDMT SUBQ TIS 1ST 20SQCM/<: CPT | Mod: ,,, | Performed by: EMERGENCY MEDICINE

## 2023-05-08 PROCEDURE — 99213 PR OFFICE/OUTPT VISIT, EST, LEVL III, 20-29 MIN: ICD-10-PCS | Mod: 25,,, | Performed by: EMERGENCY MEDICINE

## 2023-05-08 PROCEDURE — 11042 DEBRIDEMENT: ICD-10-PCS | Mod: ,,, | Performed by: EMERGENCY MEDICINE

## 2023-05-08 PROCEDURE — 27000999 HC MEDICAL RECORD PHOTO DOCUMENTATION

## 2023-05-08 PROCEDURE — 99213 OFFICE O/P EST LOW 20 MIN: CPT | Mod: 25,,, | Performed by: EMERGENCY MEDICINE

## 2023-05-08 PROCEDURE — 11042 DBRDMT SUBQ TIS 1ST 20SQCM/<: CPT

## 2023-05-08 PROCEDURE — 99213 OFFICE O/P EST LOW 20 MIN: CPT | Mod: 25

## 2023-05-08 RX ORDER — CIPROFLOXACIN 500 MG/1
500 TABLET ORAL EVERY 12 HOURS
Qty: 28 TABLET | Refills: 0 | Status: SHIPPED | OUTPATIENT
Start: 2023-05-08 | End: 2023-05-22

## 2023-05-08 RX ORDER — AMOXICILLIN AND CLAVULANATE POTASSIUM 875; 125 MG/1; MG/1
1 TABLET, FILM COATED ORAL EVERY 12 HOURS
Qty: 28 TABLET | Refills: 0 | Status: SHIPPED | OUTPATIENT
Start: 2023-05-08 | End: 2023-05-22

## 2023-05-08 NOTE — PATIENT INSTRUCTIONS
Pt seen today by:      antibiotics at pharmacy and take as directed    Self care DRESSING INSTRUCTIONS:      Wound location: Right Great Toe,  plantar and medial       Dressings to be changed daily or every other day and as needed if soiled or not intact.  Cleanse wound with wound cleanser or saline  Cover with calcium alginate silver  May alternate wound care with use of therahoney gel  Cover with abd pad or exudry dressing and secure with a shaka/cover roll tape  Wrap entire top of foot with kerlix.  Do not wrap toe individually and DO NOT USE COBAN WRAP  Wear Darco shoe    Return to clinic on Monday May 15th 2023 at 8am     Wound may have been debrided in clinic: if so, WHAT YOU NEED TO KNOW:    Debridement is the removal of infected, damaged, or dead tissue so a wound can heal properly. Your wound may need more than one debridement. Debridement can cause bleeding, and a small amount of blood is expected.  AFTER A DEBRIDEMENT:  Keep your wound clean and dry. Do not remove the dressing unless instructed.  Follow the wound care orders provided to you or your home health care provider.  If you have pain, take over the counter pain relievers or pain medication if prescribed.  Elevate the wound and limit excessive activity to prevent bleeding and/or swelling in your wound.  If you see blood coming through the dressing, apply gauze and tape over the dressing and hold firm pressure to the wound with your hand for 5-10 minutes continuously, without peeking, to help the bleeding stop.  Contact [facility] wound care team at [phone] or go to the nearest Emergency department if:  You have a fever greater than 101 taken by mouth.  Your pain gets worse or does not go away, even after taking your regular pain medicine.  Your skin around your wound is red, hot, swollen, or draining pus.  You have bleeding that continues to come through the dressing after holding pressure for 10 minutes   The current daily  value (%DV) for protein is 50 grams per day and is meant as a general goal for most people. Further increasing your dietary protein intake is very important for wound healing. Typically one needs over 100g of protein per day to help with wound healing needs.  If you are a dialysis patient or have problems with your kidneys, talk to your Nephrologist about how much protein you can take in with your condition.  Examples of high protein items that can be added to your diet include: eggs, chicken, red meats, almonds, cottage cheese, Greek yogurt, beans, and peanut butter.  Fortified protein bars, shakes and drinks can add 15-30 additional grams of protein per serving.   Also add:   1 daily general multivitamin   Sidney : 1 packet twice daily   Vitamin C : 500mg twice daily   Zinc 220 mg daily  Vit D : once daily    Call our [facility] wound clinic for questions/concerns a [phone] .

## 2023-05-08 NOTE — PROCEDURES
"Debridement    Date/Time: 5/8/2023 8:22 AM  Performed by: Soheila Vidal MD  Authorized by: Soheila Vidal MD     Time out: Immediately prior to procedure a "time out" was called to verify the correct patient, procedure, equipment, support staff and site/side marked as required.    Consent Done?:  Yes (Written)  Local anesthesia used?: Yes    Local anesthetic:  Topical anesthetic    Wound Details:    Location:  Right foot    Location:  Right 1st Toe    Type of Debridement:  Excisional       Length (cm):  1.3       Area (sq cm):  3.12       Width (cm):  2.4       Percent Debrided (%):  100       Depth (cm):  0.3       Total Area Debrided (sq cm):  3.12    Depth of debridement:  Subcutaneous tissue    Tissue debrided:  Dermis and Subcutaneous    Devitalized tissue debrided:  Biofilm and Slough    Instruments:  Curette  Bleeding:  Minimal  Patient tolerance:  Patient tolerated the procedure well with no immediate complications  "

## 2023-05-08 NOTE — PROGRESS NOTES
Subjective:       Patient ID: Jong Clayton is a 52 y.o. male.    Chief Complaint: No chief complaint on file.      51 y/o WM with morbid obesity, diabetes, neuropathy hypertension, dyslipidemia, chronic back pain, bipolar and depression who has battled various DFU's with osteomyelitis (left 2nd toe 2019 and again 2020). I have treated him in past for this left 2nd toe and noted then he was noncompliant with my recommendations. I last saw him for that left 2nd toe in March 2020 where he had a bulbous left 2nd hammer toe with +MRI for osteo. He never returned as scheduled but ultimately followed through with my prior recs of distal tip amputation.    He returned to clinic in mid May 2022 complaining of a chronic untreated right plantar great toe ulcer which began in Dec 2021 that he tried to self treat. His wife finally got him to agree to come to clinic but unfortunately he continues to demonstrate noncompliance along with ongoing uncontrolled diabetes with self admitted poor diet . In addition, we are limited in our treatment options because of his insurance:   For example they refused my request for IV and/or IM antibiotics in fall of 2022.  Back in the summer I ordered an MRI of this right big toe which took him months to get done (but finally had it in October 2022 which was neg for osteomyelitis).  Patient came into clinic at  the end of November after a >1month absence with a significant decline in the right toe wound with evidence of bruising . I prescribed 2 antibiotics: bactrim and augmentin. Cellulitis and periwound blistering resolved but left with a larger DFU. I placed on more antibiotics :   augmentin for +Enterococcus cx. (X one month) His most recent  HBa1c over 11.  I have long told him that most likely needs a toe amputation.  He is at high-risk for needing this toe amputated.  He has been very inconsistent with keeping his appointments here : after 3/3/23, didn't return until 3/23/23 and then  didn't return until 4/18/23. Says his back has been giving him trouble. Wife continues to do wound care. He since then has missed several more appointments finally coming in today on 5/8/23 with not only a significant decline in the volar based toe ulcer but now with a new necrotic appearing ulcer on lateral proximal side of great toe . Nurse says it was wrapped tightly with coban which was done by wife a few days ago per patient. No pain in this area      Review of Systems   Constitutional:  Positive for fatigue. Negative for chills and fever.   HENT: Negative.     Respiratory: Negative.     Cardiovascular: Negative.    Gastrointestinal: Negative.    Musculoskeletal:  Positive for back pain.   Skin:  Positive for wound (see hpi).   Neurological:  Positive for numbness (to both feet). Negative for tremors, seizures, syncope and speech difficulty.       Objective:       Vitals:    05/08/23 0817   BP: 123/63   Pulse: 81   Resp: (!) 22   Temp: 98 °F (36.7 °C)          Physical Exam  Constitutional:       Appearance: He is morbidly obese.   HENT:      Head: Normocephalic and atraumatic.   Eyes:      Conjunctiva/sclera: Conjunctivae normal.   Cardiovascular:      Pulses: Normal pulses.           Dorsalis pedis pulses are 2+ on the right side and 2+ on the left side.      Comments: +hair on legs  Musculoskeletal:      Right lower leg: No edema.      Left lower leg: No edema.        Feet:    Feet:      Comments: Hallux valgus deformity noted  Skin:     General: Skin is warm.      Capillary Refill: Capillary refill takes less than 2 seconds.   Neurological:      General: No focal deficit present.      Mental Status: He is alert and oriented to person, place, and time. Mental status is at baseline.            Altered Skin Integrity 05/08/23 0840 Right medial Toe, first #2 Diabetic Ulcer Purple or maroon localized area of discolored intact skin or non-intact skin or a blood-filled blister. (Active)   05/08/23 0840   Altered  Skin Integrity Present on Admission - Did Patient arrive to the hospital with altered skin?:    Side: Right   Orientation: medial   Location: Toe, first   Wound Number: #2   Is this injury device related?:    Primary Wound Type: Diabetic ulc   Description of Altered Skin Integrity: Purple or maroon localized area of discolored intact skin or non-intact skin or a blood-filled blister.   Ankle-Brachial Index:    Pulses:    Removal Indication and Assessment:    Wound Outcome:    (Retired) Wound Length (cm):    (Retired) Wound Width (cm):    (Retired) Depth (cm):    Wound Description (Comments):    Removal Indications:    Wound Image   05/08/23 0857   Description of Altered Skin Integrity Purple or maroon localized area of discolored intact skin or non-intact skin or a blood-filled blister. 05/08/23 0857   Dressing Appearance Moist drainage 05/08/23 0857   Drainage Amount Small 05/08/23 0857   Drainage Characteristics/Odor Serosanguineous 05/08/23 0857   Appearance Pink;Red;Maroon;Purple;Moist;Blistered 05/08/23 0857   Black (%), Wound Tissue Color 80 % 05/08/23 0857   Red (%), Wound Tissue Color 20 % 05/08/23 0857   Yellow (%), Wound Tissue Color 0 % 05/08/23 0857   Periwound Area Blistered;Swelling;Redness 05/08/23 0857   Wound Edges Undefined 05/08/23 0857   Wound Length (cm) 1.7 cm 05/08/23 0857   Wound Width (cm) 2.3 cm 05/08/23 0857   Wound Depth (cm) 0.1 cm 05/08/23 0857   Wound Volume (cm^3) 0.391 cm^3 05/08/23 0857   Wound Surface Area (cm^2) 3.91 cm^2 05/08/23 0857   Care Cleansed with:;Wound cleanser;Applied: 05/08/23 0857   Dressing Removed;Applied;Calcium alginate;Absorptive Pad;Rolled gauze 05/08/23 0857            (Retired) Wound 04/18/23 0918 Diabetic Ulcer Right plantar Toe, first #1 (Active)   04/18/23 0918    Pre-existing: Yes   Primary Wound Type: Diabetic ulc   Side: Right   Orientation: plantar   Location: Toe, first   Wound Number: #1   Ankle-Brachial Index: paul done 5/08/2023 = right dp = 1.06,  pt = 1.15   Pulses: + palpable x 4, + doppler, right bi phasic, pt= tri phasic,     left= biphasic x 2   Removal Indication and Assessment:    Wound Outcome:    (Retired) Wound Type:    (Retired) Wound Length (cm):    (Retired) Wound Width (cm):    (Retired) Depth (cm):    Wound Description (Comments):    Removal Indications:    Wound Image    05/08/23 0851   Dressing Appearance Moist drainage 05/08/23 0851   Drainage Amount Moderate 05/08/23 0851   Drainage Characteristics/Odor Serosanguineous 05/08/23 0851   Appearance Pink;Moist;Epithelialization 05/08/23 0851   Black (%), Wound Tissue Color 0 % 05/08/23 0851   Red (%), Wound Tissue Color 100 % 05/08/23 0851   Yellow (%), Wound Tissue Color 0 % 05/08/23 0851   Periwound Area Blistered 05/08/23 0851   Wound Edges Defined 05/08/23 0851   Wound Length (cm) 0.7 cm 05/08/23 0851   Wound Width (cm) 0.3 cm 05/08/23 0851   Wound Depth (cm) 0.3 cm 05/08/23 0851   Wound Volume (cm^3) 0.063 cm^3 05/08/23 0851   Wound Surface Area (cm^2) 0.21 cm^2 05/08/23 0851   Care Cleansed with:;Wound cleanser;Applied: 05/08/23 0851   Dressing Applied;Calcium alginate;Absorptive Pad;Rolled gauze 05/08/23 0851               Assessment:       1. Ulcer of right great toe due to diabetes mellitus    2. Skin ulcer of right great toe with fat layer exposed    3. Type 2 diabetes mellitus with foot ulcer, without long-term current use of insulin    4. Cellulitis of great toe of right foot    5. Uncontrolled type 2 diabetes mellitus with hyperglycemia    6. Diabetic polyneuropathy associated with type 2 diabetes mellitus    7. Staph aureus infection    8. Enterococcus faecalis infection    9. Group B streptococcal infection    10. Morbid obesity    11. BMI 50.0-59.9, adult         Right hallux DFU: first clinic visit 5/19/22, neg xray: recalcitrant to healing complicated by his noncompliance, ongoing uncontrolled DM and frequent missed clinic visits;  deterioration late Nov 2022 with toe  cellulitis: rx bactrim and augmentin and then 2 weeks of Cipro prescribed on 12/13/2022/ +tissue cx for Enterococcus on 1/3/23: rx augmentin x 4-6 weeks total : deterioration again on 5/8/12  Right foot MRI 10/5/22: no osteomyelitis  New right great toe ulcer on lateral base of toe from a tight coban wrap noted 5/8/23  Prior left 2nd toe osteomyelitis with partial amputation 2020   diabetes,uncontrolled:  hba1c 9.1 April 2022,  7.3 July 2022 up to 11.1 Dec 2022  Morbid obesity with bmi 57, he is gaining weight (was54)  Hypertension   Hyperlipidemia  H/o depression/bipolar  Medical noncompliance        Plan:         Plan of Care:                    Once again missing appointments returning today on 5/8/23 with a sig decline in the chronic right hallux volar ulcer: much bigger/sig callous covering with extension to the medial big toe. He also prsented with a new necrotic appearing wound on big toe: lateral proximal side from a tight coban wrapped on toe for several days per his wife.   I debrided the chronic ulcer and much larger than in the past  I will go ahead and rx augmentin and cipro   I feel will need hallux amputation most likely in near future  Offloading: must offload the wound to potentiate wound healing  Nutrition: Must have a high protein diet to support wound  healing; (if renal disease, see nephrologist for amount allowed):  this should be over 100g protein /day (if no kidney issues); Also rec MVI along with vit C, vit D, zinc and Sidney  Diabetes: must have a strict diabetic diet, take meds and encourage lower hba1c His DM is very uncontrolled and always hyperglycemic; PCP aware and pt aware he must do better  Rtc one week     The time spent including preparing to see the patient, obtaining patient history and assessment, evaluation of the plan of care, patient/caregiver counseling and education, orders, documentation, coordination of care, and other professional medical management activities for  today's encounter was 25 minutes.    Time spent performing procedures during today's encounter was 15 minutes.     Unknown

## 2023-06-26 ENCOUNTER — HOSPITAL ENCOUNTER (OUTPATIENT)
Dept: WOUND CARE | Facility: HOSPITAL | Age: 53
Discharge: HOME | End: 2023-06-26
Attending: EMERGENCY MEDICINE
Payer: MEDICARE

## 2023-06-26 VITALS
WEIGHT: 315 LBS | RESPIRATION RATE: 20 BRPM | HEART RATE: 75 BPM | SYSTOLIC BLOOD PRESSURE: 158 MMHG | HEIGHT: 70 IN | DIASTOLIC BLOOD PRESSURE: 84 MMHG | BODY MASS INDEX: 45.1 KG/M2 | TEMPERATURE: 98 F

## 2023-06-26 DIAGNOSIS — E11.621 TYPE 2 DIABETES MELLITUS WITH FOOT ULCER, WITHOUT LONG-TERM CURRENT USE OF INSULIN: ICD-10-CM

## 2023-06-26 DIAGNOSIS — E11.65 UNCONTROLLED TYPE 2 DIABETES MELLITUS WITH HYPERGLYCEMIA: ICD-10-CM

## 2023-06-26 DIAGNOSIS — E11.42 DIABETIC POLYNEUROPATHY ASSOCIATED WITH TYPE 2 DIABETES MELLITUS: ICD-10-CM

## 2023-06-26 DIAGNOSIS — E11.621 ULCER OF RIGHT GREAT TOE DUE TO DIABETES MELLITUS: Primary | ICD-10-CM

## 2023-06-26 DIAGNOSIS — I10 ESSENTIAL HYPERTENSION, BENIGN: ICD-10-CM

## 2023-06-26 DIAGNOSIS — L97.509 TYPE 2 DIABETES MELLITUS WITH FOOT ULCER, WITHOUT LONG-TERM CURRENT USE OF INSULIN: ICD-10-CM

## 2023-06-26 DIAGNOSIS — Z91.199 MEDICALLY NONCOMPLIANT: ICD-10-CM

## 2023-06-26 DIAGNOSIS — L97.512 SKIN ULCER OF RIGHT GREAT TOE WITH FAT LAYER EXPOSED: ICD-10-CM

## 2023-06-26 DIAGNOSIS — E66.01 MORBID OBESITY: ICD-10-CM

## 2023-06-26 DIAGNOSIS — L97.519 ULCER OF RIGHT GREAT TOE DUE TO DIABETES MELLITUS: Primary | ICD-10-CM

## 2023-06-26 DIAGNOSIS — F31.81 BIPOLAR II DISORDER: ICD-10-CM

## 2023-06-26 LAB — POCT GLUCOSE: 163 MG/DL (ref 70–110)

## 2023-06-26 PROCEDURE — 99214 OFFICE O/P EST MOD 30 MIN: CPT

## 2023-06-26 PROCEDURE — 99214 OFFICE O/P EST MOD 30 MIN: CPT | Mod: ,,, | Performed by: EMERGENCY MEDICINE

## 2023-06-26 PROCEDURE — 27000999 HC MEDICAL RECORD PHOTO DOCUMENTATION

## 2023-06-26 PROCEDURE — 99214 PR OFFICE/OUTPT VISIT, EST, LEVL IV, 30-39 MIN: ICD-10-PCS | Mod: ,,, | Performed by: EMERGENCY MEDICINE

## 2023-06-26 NOTE — PATIENT INSTRUCTIONS
Pt seen today by:       Self care DRESSING INSTRUCTIONS:      Wound location: Right plantar Great Toe,        Dressings to be changed daily or every other day and as needed if soiled or not intact.  Toe ulcer healed. Keep area padded and cont. To off load to protect fragile skin to area  Wear Darco shoe    Return to clinic on Friday July 7th, 2023 at 11am     Wound may have been debrided in clinic: if so, WHAT YOU NEED TO KNOW:    Debridement is the removal of infected, damaged, or dead tissue so a wound can heal properly. Your wound may need more than one debridement. Debridement can cause bleeding, and a small amount of blood is expected.  AFTER A DEBRIDEMENT:  Keep your wound clean and dry. Do not remove the dressing unless instructed.  Follow the wound care orders provided to you or your home health care provider.  If you have pain, take over the counter pain relievers or pain medication if prescribed.  Elevate the wound and limit excessive activity to prevent bleeding and/or swelling in your wound.  If you see blood coming through the dressing, apply gauze and tape over the dressing and hold firm pressure to the wound with your hand for 5-10 minutes continuously, without peeking, to help the bleeding stop.  Contact [facility] wound care team at [phone] or go to the nearest Emergency department if:  You have a fever greater than 101 taken by mouth.  Your pain gets worse or does not go away, even after taking your regular pain medicine.  Your skin around your wound is red, hot, swollen, or draining pus.  You have bleeding that continues to come through the dressing after holding pressure for 10 minutes   The current daily value (%DV) for protein is 50 grams per day and is meant as a general goal for most people. Further increasing your dietary protein intake is very important for wound healing. Typically one needs over 100g of protein per day to help with wound healing needs.  If you are a dialysis  patient or have problems with your kidneys, talk to your Nephrologist about how much protein you can take in with your condition.  Examples of high protein items that can be added to your diet include: eggs, chicken, red meats, almonds, cottage cheese, Greek yogurt, beans, and peanut butter.  Fortified protein bars, shakes and drinks can add 15-30 additional grams of protein per serving.   Also add:   1 daily general multivitamin   Sidney : 1 packet twice daily   Vitamin C : 500mg twice daily   Zinc 220 mg daily  Vit D : once daily    Call our [facility] wound clinic for questions/concerns a [phone] .

## 2023-06-26 NOTE — PROGRESS NOTES
Subjective:       Patient ID: Jong Clayton is a 53 y.o. male.    Chief Complaint: No chief complaint on file.      54 y/o WM with morbid obesity, diabetes, neuropathy hypertension, dyslipidemia, chronic back pain, bipolar and depression who has battled various DFU's with osteomyelitis (left 2nd toe 2019 and again 2020). I had treated him in the past.    He developed a right hallux DFU in Dec 2021 but did not return to clinic until May 2022.  His treatment has been very difficult because he is noncompliant, misses visits often, profoundly uncontrolled diabetes along with ongoing weight gain and an insurance difficult to deal with.     This DFU has waxed and waned and goes from almost healed to looking terrible at times.  Neg MRI in fall 2022.    His visits since early March 2023 very sporadic. He was last here on 5/8/23  with not only a significant decline in the volar based hallux ulcer but  with a new necrotic appearing ulcer on proximal webspace side right  great toe where his wife wrapped it too tight.  He did not return   He suffered a fall with right tibia fx needing ORIF on 5/16/23.   Finally returned here today on 6/23/23.  Leg still hurts; seeing ortho today but noted right knee open post surgical wound. Says he went back to ED at [facility] last week because of RLE swelling/pain: neg DVT..      Review of Systems   Constitutional:  Positive for fatigue. Negative for chills and fever.   HENT: Negative.     Respiratory: Negative.     Cardiovascular: Negative.    Gastrointestinal: Negative.    Musculoskeletal:  Positive for back pain.   Skin:  Positive for wound (see hpi).   Neurological:  Positive for numbness (to both feet). Negative for tremors, seizures, syncope and speech difficulty.       Objective:       Vitals:    06/26/23 1309   BP: (!) 158/84   Pulse: 75   Resp: 20   Temp: 98.2 °F (36.8 °C)          Physical Exam  Constitutional:       Appearance: He is morbidly obese.   HENT:      Head: Normocephalic  and atraumatic.   Eyes:      Conjunctiva/sclera: Conjunctivae normal.   Cardiovascular:      Pulses: Normal pulses.           Dorsalis pedis pulses are 2+ on the right side and 2+ on the left side.      Comments: +hair on legs  Musculoskeletal:      Right lower leg: No edema.      Left lower leg: No edema.        Legs:         Feet:       Comments: Rle swollen   Feet:      Comments: Hallux valgus deformity noted  Skin:     General: Skin is warm.      Capillary Refill: Capillary refill takes less than 2 seconds.   Neurological:      General: No focal deficit present.      Mental Status: He is alert and oriented to person, place, and time. Mental status is at baseline.            (Retired) Wound 04/18/23 0918 Diabetic Ulcer Right plantar Toe, first #1 (Active)   04/18/23 0918    Pre-existing: Yes   Primary Wound Type: Diabetic ulc   Side: Right   Orientation: plantar   Location: Toe, first   Wound Number: #1   Ankle-Brachial Index: paul done 5/08/2023 = right dp = 1.06, pt = 1.15   Pulses: + palpable x 4, + doppler, right bi phasic x 2     left= biphasic x 2   Removal Indication and Assessment:    Wound Outcome:    (Retired) Wound Type:    (Retired) Wound Length (cm):    (Retired) Wound Width (cm):    (Retired) Depth (cm):    Wound Description (Comments):    Removal Indications:    Wound Image     06/26/23 1323   Dressing Appearance Intact;Dry;Clean 06/26/23 1323   Drainage Amount None 06/26/23 1323   Appearance Intact;Eschar;Dry;Epithelialization 06/26/23 1323   Periwound Area Intact;Edematous 06/26/23 1323   Wound Edges Undefined 06/26/23 1323   Wound Length (cm) 1 cm 06/26/23 1323   Wound Width (cm) 1.5 cm 06/26/23 1323   Wound Depth (cm) 0 cm 06/26/23 1323   Wound Volume (cm^3) 0 cm^3 06/26/23 1323   Wound Surface Area (cm^2) 1.5 cm^2 06/26/23 1323   Care Cleansed with:;Soap and water;Applied: 06/26/23 1323   Dressing Removed;Applied;Absorptive Pad 06/26/23 1323               Assessment:       1. Ulcer of right  great toe due to diabetes mellitus    2. Skin ulcer of right great toe with fat layer exposed    3. Type 2 diabetes mellitus with foot ulcer, without long-term current use of insulin    4. Uncontrolled type 2 diabetes mellitus with hyperglycemia    5. Diabetic polyneuropathy associated with type 2 diabetes mellitus    6. Morbid obesity    7. BMI 50.0-59.9, adult    8. Medically noncompliant    9. Bipolar II disorder    10. Essential hypertension, benign         Right hallux DFU: first clinic visit 5/19/22, neg xray: recalcitrant to healing complicated by his noncompliance, ongoing uncontrolled DM and frequent missed clinic visits;  deterioration late Nov 2022 with toe cellulitis: rx bactrim and augmentin and then 2 weeks of Cipro prescribed on 12/13/2022/ +tissue cx for Enterococcus on 1/3/23: rx augmentin x 4-6 weeks total : deterioration again on 5/8/12: closed 6/26/23  Right foot MRI 10/5/22: no osteomyelitis  New right great toe ulcer on lateral base of toe from a tight coban wrap noted 5/8/23: closed/healed 6/23/23   Prior left 2nd toe osteomyelitis with partial amputation 2020   diabetes,uncontrolled:  hba1c 9.1 April 2022,  7.3 July 2022 up to 11.1 Dec 2022  Morbid obesity with bmi 57, he is gaining weight (was54)  Hypertension   Hyperlipidemia  H/o depression/bipolar  Medical noncompliance  Right tibia fx with ORIF 5/16/23: Post op surgical wound /dehiscence noted 6/23/23      Specimen:  Blood - Venous structure (body structure)   Ref Range & Units 2 wk ago Comments   Creatinine Level 0.57 - 1.25 mg/dL 1.02     Blood Urea Nitrogen Level 5 - 25 mg/dL 15     Sodium Level 136 - 145 mmol/L 141     Potassium Level 3.5 - 5.1 mmol/L 4.3     Chloride Level 100 - 109 mmol/L 101     CO2 Level 22 - 33 mmol/L 27     Glucose Level 70 - 100 mg/dL 189 High      Calcium Level 8.8 - 10.6 mg/dL 9.3     Protein Total 6.0 - 8.3 g/dL 6.7     Albumin Level 3.5 - 5.0 g/dl 3.4 Low      Bilirubin Total 0.2 - 1.2 mg/dL 0.3      Alkaline Phosphatase Level 40 - 150 U/L 104     SGOT (AST) 10 - 58 U/L 11     SGPT (ALT) 5 - 50 U/L 15     Anion Gap 8 - 16 mmol/L 13     EGFR mL/min/1.73mSq 89       Plan:         Plan of Care:                    He suffered a fall with right tibia fx needing ORIF on 5/16/23. I reviewed records in Epic that I can see from [facility]. Pulled in recent labs. Can't see everything.  The right chronic dfu seems to have closed most likely from his inability to get up on it with recent leg fx.  Hopefully it will last  Going see ortho today: has a post op open wound near knee: happy to treat if Dr Szymanski would like  Nutrition: Must have a high protein diet to support wound  healing; (if renal disease, see nephrologist for amount allowed):  this should be over 100g protein /day (if no kidney issues); Also rec MVI along with vit C, vit D, zinc and Sidney  Diabetes: must have a strict diabetic diet, take meds and encourage lower hba1c His DM is very uncontrolled and always hyperglycemic; PCP aware and pt aware he must do better  Rtc 2 weeks per his request     The time spent including preparing to see the patient, obtaining patient history and assessment, evaluation of the plan of care, patient/caregiver counseling and education, orders, documentation, coordination of care, and other professional medical management activities for today's encounter was 35 minutes.

## 2023-07-10 ENCOUNTER — HOSPITAL ENCOUNTER (OUTPATIENT)
Dept: WOUND CARE | Facility: HOSPITAL | Age: 53
Discharge: HOME | End: 2023-07-10
Attending: EMERGENCY MEDICINE
Payer: MEDICARE

## 2023-07-10 VITALS
RESPIRATION RATE: 18 BRPM | HEART RATE: 77 BPM | DIASTOLIC BLOOD PRESSURE: 81 MMHG | BODY MASS INDEX: 45.1 KG/M2 | HEIGHT: 70 IN | TEMPERATURE: 98 F | WEIGHT: 315 LBS | SYSTOLIC BLOOD PRESSURE: 152 MMHG

## 2023-07-10 DIAGNOSIS — L97.509 TYPE 2 DIABETES MELLITUS WITH FOOT ULCER, WITHOUT LONG-TERM CURRENT USE OF INSULIN: ICD-10-CM

## 2023-07-10 DIAGNOSIS — E66.01 MORBID OBESITY: ICD-10-CM

## 2023-07-10 DIAGNOSIS — L97.512 SKIN ULCER OF RIGHT GREAT TOE WITH FAT LAYER EXPOSED: ICD-10-CM

## 2023-07-10 DIAGNOSIS — T81.32XA DISRUPTION OF INTERNAL OPERATION (SURGICAL) WOUND, NOT ELSEWHERE CLASSIFIED, INITIAL ENCOUNTER: ICD-10-CM

## 2023-07-10 DIAGNOSIS — S81.001A OPEN WOUND OF RIGHT KNEE, INITIAL ENCOUNTER: ICD-10-CM

## 2023-07-10 DIAGNOSIS — L97.519 ULCER OF RIGHT GREAT TOE DUE TO DIABETES MELLITUS: ICD-10-CM

## 2023-07-10 DIAGNOSIS — E11.65 UNCONTROLLED TYPE 2 DIABETES MELLITUS WITH HYPERGLYCEMIA: ICD-10-CM

## 2023-07-10 DIAGNOSIS — F31.81 BIPOLAR II DISORDER: ICD-10-CM

## 2023-07-10 DIAGNOSIS — E11.621 ULCER OF RIGHT GREAT TOE DUE TO DIABETES MELLITUS: ICD-10-CM

## 2023-07-10 DIAGNOSIS — E11.42 DIABETIC POLYNEUROPATHY ASSOCIATED WITH TYPE 2 DIABETES MELLITUS: ICD-10-CM

## 2023-07-10 DIAGNOSIS — E11.621 TYPE 2 DIABETES MELLITUS WITH FOOT ULCER, WITHOUT LONG-TERM CURRENT USE OF INSULIN: ICD-10-CM

## 2023-07-10 DIAGNOSIS — Z91.199 MEDICALLY NONCOMPLIANT: ICD-10-CM

## 2023-07-10 PROCEDURE — 99499 NO LOS: ICD-10-PCS | Mod: ,,, | Performed by: EMERGENCY MEDICINE

## 2023-07-10 PROCEDURE — 11042 DEBRIDEMENT: ICD-10-PCS | Mod: ,,, | Performed by: EMERGENCY MEDICINE

## 2023-07-10 PROCEDURE — 27000999 HC MEDICAL RECORD PHOTO DOCUMENTATION

## 2023-07-10 PROCEDURE — 99499 UNLISTED E&M SERVICE: CPT | Mod: ,,, | Performed by: EMERGENCY MEDICINE

## 2023-07-10 PROCEDURE — 11042 DBRDMT SUBQ TIS 1ST 20SQCM/<: CPT | Mod: ,,, | Performed by: EMERGENCY MEDICINE

## 2023-07-10 PROCEDURE — 11042 DBRDMT SUBQ TIS 1ST 20SQCM/<: CPT

## 2023-07-10 NOTE — PATIENT INSTRUCTIONS
Pt seen today by:       Self care DRESSING INSTRUCTIONS:      Wound location: Right plantar Great Toe,      Healed, continue to off load  and monitor    Wound location:  Right knee    Cleanse with saline or wound cleanser  Apply aquacel silver to wound bed  Cover with large bandaid or abd pad and tape  Change daily    Return to clinic on Monday July 17th, 2023 at 1:00 pm     Wound may have been debrided in clinic: if so, WHAT YOU NEED TO KNOW:    Debridement is the removal of infected, damaged, or dead tissue so a wound can heal properly. Your wound may need more than one debridement. Debridement can cause bleeding, and a small amount of blood is expected.  AFTER A DEBRIDEMENT:  Keep your wound clean and dry. Do not remove the dressing unless instructed.  Follow the wound care orders provided to you or your home health care provider.  If you have pain, take over the counter pain relievers or pain medication if prescribed.  Elevate the wound and limit excessive activity to prevent bleeding and/or swelling in your wound.  If you see blood coming through the dressing, apply gauze and tape over the dressing and hold firm pressure to the wound with your hand for 5-10 minutes continuously, without peeking, to help the bleeding stop.  Contact [facility] wound care team at [phone] or go to the nearest Emergency department if:  You have a fever greater than 101 taken by mouth.  Your pain gets worse or does not go away, even after taking your regular pain medicine.  Your skin around your wound is red, hot, swollen, or draining pus.  You have bleeding that continues to come through the dressing after holding pressure for 10 minutes   The current daily value (%DV) for protein is 50 grams per day and is meant as a general goal for most people. Further increasing your dietary protein intake is very important for wound healing. Typically one needs over 100g of protein per day to help with wound healing needs.  If you  are a dialysis patient or have problems with your kidneys, talk to your Nephrologist about how much protein you can take in with your condition.  Examples of high protein items that can be added to your diet include: eggs, chicken, red meats, almonds, cottage cheese, Greek yogurt, beans, and peanut butter.  Fortified protein bars, shakes and drinks can add 15-30 additional grams of protein per serving.   Also add:   1 daily general multivitamin   Sidney : 1 packet twice daily   Vitamin C : 500mg twice daily   Zinc 220 mg daily  Vit D : once daily    Call our [facility] wound clinic for questions/concerns a [phone] .

## 2023-07-10 NOTE — PROCEDURES
Debridement    Date/Time: 7/10/2023 12:45 PM  Performed by: Soheila Vidal MD  Authorized by: Soheila Vidal MD   Associated wounds:        Altered Skin Integrity 07/10/23 0120 Right anterior Knee #3   Consent Done?:  Yes (Written)  Local anesthesia used?: Yes    Local anesthetic:  Topical anesthetic    Wound Details:    Location:  Right knee    Type of Debridement:  Excisional       Length (cm):  2.6       Area (sq cm):  1.56       Width (cm):  0.6       Percent Debrided (%):  100       Depth (cm):  0.3       Total Area Debrided (sq cm):  1.56    Depth of debridement:  Subcutaneous tissue    Tissue debrided:  Dermis, Epidermis and Subcutaneous    Devitalized tissue debrided:  Biofilm and Slough    Instruments:  Curette  Bleeding:  Minimal  Hemostasis Achieved: Yes  Method Used:  Pressure  Patient tolerance:  Patient tolerated the procedure well with no immediate complications

## 2023-07-10 NOTE — PROGRESS NOTES
Subjective:       Patient ID: Jong Clayton is a 53 y.o. male.    Chief Complaint: No chief complaint on file.      54 y/o WM with morbid obesity, diabetes, neuropathy hypertension, dyslipidemia, chronic back pain, bipolar and depression who has battled various DFU's with osteomyelitis (left 2nd toe 2019 and again 2020). I had treated him in the past.    He developed a right hallux DFU in Dec 2021 but did not return to clinic until May 2022.  His treatment has been very difficult because he is noncompliant, misses visits often, profoundly uncontrolled diabetes along with ongoing weight gain and an insurance difficult to deal with.     This DFU has waxed and waned and goes from almost healed to looking terrible at times.  Neg MRI in fall 2022.    His visits since early March 2023 very sporadic. To further complicate things, he  suffered a fall with right tibia fx needing ORIF on 5/16/23.  He finally returned here today on 6/23/23 and it did seem like the toe ulcer had healed. However it was noted he had evidence of surgical wound dehiscence over knee area. He followed up with ortho that day who now has referred pt back to wound care for the knee. He missed his visit with us last week but comes in today on 7/10/23  Toe remains healed.        Review of Systems   Constitutional:  Positive for fatigue. Negative for chills and fever.   HENT: Negative.     Respiratory: Negative.     Cardiovascular: Negative.    Gastrointestinal: Negative.    Musculoskeletal:  Positive for back pain.   Skin:  Positive for wound (see hpi).   Neurological:  Positive for numbness (to both feet). Negative for tremors, seizures, syncope and speech difficulty.       Objective:       Vitals:    07/10/23 1311   BP: (!) 152/81   Pulse: 77   Resp: 18   Temp: 97.7 °F (36.5 °C)          Physical Exam  Constitutional:       Appearance: He is morbidly obese.   HENT:      Head: Normocephalic and atraumatic.   Eyes:      Conjunctiva/sclera: Conjunctivae  normal.   Cardiovascular:      Pulses: Normal pulses.           Dorsalis pedis pulses are 2+ on the right side and 2+ on the left side.      Comments: +hair on legs  Musculoskeletal:      Right lower leg: No edema.      Left lower leg: No edema.        Legs:         Feet:       Comments: Swollen RLE mitchel on distal anterior portion: chronic since sx per pt and wife but better   Feet:      Comments: Hallux valgus deformity noted  Skin:     General: Skin is warm.      Capillary Refill: Capillary refill takes less than 2 seconds.   Neurological:      General: No focal deficit present.      Mental Status: He is alert and oriented to person, place, and time. Mental status is at baseline.            Altered Skin Integrity 07/10/23 0120 Right anterior Knee #3  (Active)   07/10/23 0120   Altered Skin Integrity Present on Admission - Did Patient arrive to the hospital with altered skin?:    Side: Right   Orientation: anterior   Location: Knee   Wound Number: #3   Is this injury device related?: No   Primary Wound Type:    Description of Altered Skin Integrity:    Ankle-Brachial Index:    Pulses:    Removal Indication and Assessment:    Wound Outcome:    (Retired) Wound Length (cm):    (Retired) Wound Width (cm):    (Retired) Depth (cm):    Wound Description (Comments):    Removal Indications:    Wound Image   07/10/23 1335   Description of Altered Skin Integrity Full thickness tissue loss. Base is covered by slough and/or eschar in the wound bed 07/10/23 1335   Dressing Appearance Moist drainage 07/10/23 1335   Drainage Amount Small 07/10/23 1335   Drainage Characteristics/Odor Yellow 07/10/23 1335   Appearance Red;Yellow;Slough 07/10/23 1335   Tissue loss description Full thickness 07/10/23 1335   Black (%), Wound Tissue Color 0 % 07/10/23 1335   Red (%), Wound Tissue Color 10 % 07/10/23 1335   Yellow (%), Wound Tissue Color 90 % 07/10/23 1335   Periwound Area Intact;Edematous 07/10/23 1335   Wound Edges Defined 07/10/23 1335    Wound Length (cm) 2.6 cm 07/10/23 1335   Wound Width (cm) 0.6 cm 07/10/23 1335   Wound Depth (cm) 0.3 cm 07/10/23 1335   Wound Volume (cm^3) 0.468 cm^3 07/10/23 1335   Wound Surface Area (cm^2) 1.56 cm^2 07/10/23 1335   Care Cleansed with:;Wound cleanser;Applied: 07/10/23 1335            (Retired) Wound 04/18/23 0918 Diabetic Ulcer Right plantar Toe, first #1 (Active)   04/18/23 0918    Pre-existing: Yes   Primary Wound Type: Diabetic ulc   Side: Right   Orientation: plantar   Location: Toe, first   Wound Number: #1   Ankle-Brachial Index: roseann done 5/08/2023 = right dp = 1.06, pt = 1.15               7/10/23    ROSEANN not done on Rt. leg due to recent surgery and pt c/o pain   Pulses: + palpable x 4, + doppler, right bi phasic x 2     left= biphasic x 2   Removal Indication and Assessment:    Wound Outcome:    (Retired) Wound Type:    (Retired) Wound Length (cm):    (Retired) Wound Width (cm):    (Retired) Depth (cm):    Wound Description (Comments):    Removal Indications:    Wound Image   07/10/23 1321   Dressing Appearance Open to air 07/10/23 1321   Drainage Amount None 07/10/23 1321   Appearance Intact;Epithelialization 07/10/23 1321   Tissue loss description Not applicable 07/10/23 1321   Black (%), Wound Tissue Color 0 % 07/10/23 1321   Red (%), Wound Tissue Color 0 % 07/10/23 1321   Yellow (%), Wound Tissue Color 0 % 07/10/23 1321   Periwound Area Intact 07/10/23 1321   Wound Length (cm) 0 cm 07/10/23 1321   Wound Width (cm) 0 cm 07/10/23 1321   Wound Depth (cm) 0 cm 07/10/23 1321   Wound Volume (cm^3) 0 cm^3 07/10/23 1321   Wound Surface Area (cm^2) 0 cm^2 07/10/23 1321   Care Cleansed with:;Soap and water 07/10/23 1321               Assessment:       1. Open wound of right knee, initial encounter    2. Disruption of internal operation (surgical) wound, not elsewhere classified, initial encounter    3. Ulcer of right great toe due to diabetes mellitus    4. Skin ulcer of right great toe with fat layer  exposed    5. Type 2 diabetes mellitus with foot ulcer, without long-term current use of insulin    6. Uncontrolled type 2 diabetes mellitus with hyperglycemia    7. Diabetic polyneuropathy associated with type 2 diabetes mellitus    8. Morbid obesity    9. BMI 50.0-59.9, adult    10. Medically noncompliant    11. Bipolar II disorder         Right hallux DFU: first clinic visit 5/19/22, neg xray: recalcitrant to healing complicated by his noncompliance, ongoing uncontrolled DM and frequent missed clinic visits;  deterioration late Nov 2022 with toe cellulitis: rx bactrim and augmentin and then 2 weeks of Cipro prescribed on 12/13/2022/ +tissue cx for Enterococcus on 1/3/23: rx augmentin x 4-6 weeks total : deterioration again on 5/8/12: closed 6/26/23  Right foot MRI 10/5/22: no osteomyelitis  Prior left 2nd toe osteomyelitis with partial amputation 2020   diabetes,uncontrolled:  hba1c 9.1 April 2022,  7.3 July 2022 up to 11.1 Dec 2022  Morbid obesity with bmi 57, he is gaining weight (was54)  Hypertension   Hyperlipidemia  H/o depression/bipolar  Medical noncompliance  Right tibia fx with ORIF 5/16/23: Post op surgical wound /dehiscence noted 6/23/23: ortho wants us to treat        Plan:         Plan of Care:                Debrided post op knee surgical wound dehsicence; Dr Szymanski wants us to treat  Toe remains healed  Nutrition: Must have a high protein diet to support wound  healing; (if renal disease, see nephrologist for amount allowed):  this should be over 100g protein /day (if no kidney issues); Also rec MVI along with vit C, vit D, zinc and Sidney , encourage weight loss  Diabetes: must have a strict diabetic diet, take meds and encourage lower hba1c His DM is very uncontrolled and always hyperglycemic; PCP aware and pt aware he must do better  Rtc 1 week per his request

## 2023-07-18 ENCOUNTER — HOSPITAL ENCOUNTER (OUTPATIENT)
Dept: WOUND CARE | Facility: HOSPITAL | Age: 53
Discharge: HOME | End: 2023-07-18
Attending: EMERGENCY MEDICINE
Payer: MEDICARE

## 2023-07-18 VITALS
BODY MASS INDEX: 45.1 KG/M2 | DIASTOLIC BLOOD PRESSURE: 78 MMHG | HEART RATE: 87 BPM | SYSTOLIC BLOOD PRESSURE: 126 MMHG | HEIGHT: 70 IN | TEMPERATURE: 98 F | RESPIRATION RATE: 18 BRPM | WEIGHT: 315 LBS

## 2023-07-18 DIAGNOSIS — S81.001A OPEN WOUND OF RIGHT KNEE, INITIAL ENCOUNTER: Primary | ICD-10-CM

## 2023-07-18 DIAGNOSIS — F31.81 BIPOLAR II DISORDER: ICD-10-CM

## 2023-07-18 DIAGNOSIS — L97.509 TYPE 2 DIABETES MELLITUS WITH FOOT ULCER, WITHOUT LONG-TERM CURRENT USE OF INSULIN: ICD-10-CM

## 2023-07-18 DIAGNOSIS — L97.519 ULCER OF RIGHT GREAT TOE DUE TO DIABETES MELLITUS: ICD-10-CM

## 2023-07-18 DIAGNOSIS — E11.621 ULCER OF RIGHT GREAT TOE DUE TO DIABETES MELLITUS: ICD-10-CM

## 2023-07-18 DIAGNOSIS — Z91.199 MEDICALLY NONCOMPLIANT: ICD-10-CM

## 2023-07-18 DIAGNOSIS — E11.621 TYPE 2 DIABETES MELLITUS WITH FOOT ULCER, WITHOUT LONG-TERM CURRENT USE OF INSULIN: ICD-10-CM

## 2023-07-18 DIAGNOSIS — E66.01 MORBID OBESITY: ICD-10-CM

## 2023-07-18 DIAGNOSIS — E11.42 DIABETIC POLYNEUROPATHY ASSOCIATED WITH TYPE 2 DIABETES MELLITUS: ICD-10-CM

## 2023-07-18 DIAGNOSIS — T81.32XA DISRUPTION OF INTERNAL OPERATION (SURGICAL) WOUND, NOT ELSEWHERE CLASSIFIED, INITIAL ENCOUNTER: ICD-10-CM

## 2023-07-18 DIAGNOSIS — E11.65 UNCONTROLLED TYPE 2 DIABETES MELLITUS WITH HYPERGLYCEMIA: ICD-10-CM

## 2023-07-18 DIAGNOSIS — L97.911 ULCER OF RIGHT LOWER EXTREMITY, LIMITED TO BREAKDOWN OF SKIN: ICD-10-CM

## 2023-07-18 PROCEDURE — 11042 DBRDMT SUBQ TIS 1ST 20SQCM/<: CPT | Mod: ,,, | Performed by: EMERGENCY MEDICINE

## 2023-07-18 PROCEDURE — 27000999 HC MEDICAL RECORD PHOTO DOCUMENTATION

## 2023-07-18 PROCEDURE — 11042 DBRDMT SUBQ TIS 1ST 20SQCM/<: CPT

## 2023-07-18 PROCEDURE — 27090011

## 2023-07-18 PROCEDURE — 11042 DEBRIDEMENT: ICD-10-PCS | Mod: ,,, | Performed by: EMERGENCY MEDICINE

## 2023-07-18 PROCEDURE — 99213 PR OFFICE/OUTPT VISIT, EST, LEVL III, 20-29 MIN: ICD-10-PCS | Mod: 25,,, | Performed by: EMERGENCY MEDICINE

## 2023-07-18 PROCEDURE — 99213 OFFICE O/P EST LOW 20 MIN: CPT | Mod: 25,,, | Performed by: EMERGENCY MEDICINE

## 2023-07-18 NOTE — PATIENT INSTRUCTIONS
Pt seen today by:       Self care DRESSING INSTRUCTIONS:      Wound location: Right plantar Great Toe,      Healed, continue to off load  and monitor    Wound location:  Right knee    Cleanse with saline or wound cleanser  Apply aquacel silver to wound bed, may use bactroban to the wound bed  Cover with large bandaid or abd pad and tape  Change daily    Distal right leg:    Apply aquacel silver to open areas and cover with dressing of choice    COMPRESSION:  Tubi  G    Return to clinic on Tuesday, July 25th, 2023 at 11:00 pm     Wound may have been debrided in clinic: if so, WHAT YOU NEED TO KNOW:    Debridement is the removal of infected, damaged, or dead tissue so a wound can heal properly. Your wound may need more than one debridement. Debridement can cause bleeding, and a small amount of blood is expected.  AFTER A DEBRIDEMENT:  Keep your wound clean and dry. Do not remove the dressing unless instructed.  Follow the wound care orders provided to you or your home health care provider.  If you have pain, take over the counter pain relievers or pain medication if prescribed.  Elevate the wound and limit excessive activity to prevent bleeding and/or swelling in your wound.  If you see blood coming through the dressing, apply gauze and tape over the dressing and hold firm pressure to the wound with your hand for 5-10 minutes continuously, without peeking, to help the bleeding stop.  Contact [facility] wound care team at [phone] or go to the nearest Emergency department if:  You have a fever greater than 101 taken by mouth.  Your pain gets worse or does not go away, even after taking your regular pain medicine.  Your skin around your wound is red, hot, swollen, or draining pus.  You have bleeding that continues to come through the dressing after holding pressure for 10 minutes   The current daily value (%DV) for protein is 50 grams per day and is meant as a general goal for most people. Further  increasing your dietary protein intake is very important for wound healing. Typically one needs over 100g of protein per day to help with wound healing needs.  If you are a dialysis patient or have problems with your kidneys, talk to your Nephrologist about how much protein you can take in with your condition.  Examples of high protein items that can be added to your diet include: eggs, chicken, red meats, almonds, cottage cheese, Greek yogurt, beans, and peanut butter.  Fortified protein bars, shakes and drinks can add 15-30 additional grams of protein per serving.   Also add:   1 daily general multivitamin   Sidney : 1 packet twice daily   Vitamin C : 500mg twice daily   Zinc 220 mg daily  Vit D : once daily    Call our [facility] wound clinic for questions/concerns a [phone] .

## 2023-07-18 NOTE — PROCEDURES
"Debridement    Date/Time: 7/18/2023 10:45 AM  Performed by: Soheila Vidal MD  Authorized by: Soheila Vidal MD   Associated wounds:        Altered Skin Integrity 07/10/23 0120 Right anterior Knee #3   Time out: Immediately prior to procedure a "time out" was called to verify the correct patient, procedure, equipment, support staff and site/side marked as required.    Consent Done?:  Yes (Written)    Preparation: Patient was prepped and draped in usual sterile fashion    Local anesthesia used?: Yes    Local anesthetic:  Topical anesthetic    Wound Details:    Location:  Right knee    Type of Debridement:  Excisional       Length (cm):  1.4       Area (sq cm):  1.12       Width (cm):  0.8       Percent Debrided (%):  100       Depth (cm):  0.2       Total Area Debrided (sq cm):  1.12    Depth of debridement:  Subcutaneous tissue    Tissue debrided:  Dermis, Subcutaneous and Epidermis    Devitalized tissue debrided:  Biofilm and Slough    Instruments:  Curette  Bleeding:  Minimal  Patient tolerance:  Patient tolerated the procedure well with no immediate complications  "

## 2023-07-18 NOTE — PROGRESS NOTES
Subjective:       Patient ID: Jong Clayton is a 53 y.o. male.    Chief Complaint: No chief complaint on file.      54 y/o WM with morbid obesity, diabetes, neuropathy hypertension, dyslipidemia, chronic back pain, bipolar and depression who has battled various DFU's with osteomyelitis (left 2nd toe 2019 and again 2020). I had treated him in the past.    He developed a right hallux DFU in Dec 2021 but did not return to clinic until May 2022.  His treatment has been very difficult because he is noncompliant, misses visits often, profoundly uncontrolled diabetes along with ongoing weight gain and an insurance difficult to deal with.     This DFU has waxed and waned and goes from almost healed to looking terrible at times.  Neg MRI in fall 2022.    His visits since early March 2023 very sporadic. To further complicate things, he  suffered a fall with right tibia fx needing ORIF on 5/16/23.  He finally returned here today on 6/23/23 with toe ulcer  healed. However it was noted he had evidence of surgical wound dehiscence over knee area. He followed up with ortho that day who now has referred pt back to wound care for the knee.   He is a battling an area on distal shin with localized swelling that ortho is aware of and has already had a neg N[facility]A at [facility] but with skin breakdown that has waxed and wanted. More open today      Review of Systems   Constitutional:  Positive for fatigue. Negative for chills and fever.   HENT: Negative.     Respiratory: Negative.     Cardiovascular: Negative.    Gastrointestinal: Negative.    Musculoskeletal:  Positive for back pain.   Skin:  Positive for wound (see hpi).   Neurological:  Positive for numbness (to both feet). Negative for tremors, seizures, syncope and speech difficulty.       Objective:       Vitals:    07/18/23 1052   BP: 126/78   Pulse: 87   Resp: 18   Temp: 98.2 °F (36.8 °C)          Physical Exam  Constitutional:       Appearance: He is morbidly obese.   HENT:       Head: Normocephalic and atraumatic.   Eyes:      Conjunctiva/sclera: Conjunctivae normal.   Cardiovascular:      Pulses: Normal pulses.           Dorsalis pedis pulses are 2+ on the right side and 2+ on the left side.      Comments: +hair on legs  Musculoskeletal:        Legs:         Feet:       Comments: Swollen RLE mitchel on distal anterior portion which makes a large lump ; +skin breakdown/superficial ulcers; no cellulits   Feet:      Comments: Hallux valgus deformity noted  Skin:     General: Skin is warm.      Capillary Refill: Capillary refill takes less than 2 seconds.   Neurological:      General: No focal deficit present.      Mental Status: He is alert and oriented to person, place, and time. Mental status is at baseline.            Altered Skin Integrity 07/10/23 0120 Right anterior Knee #3  (Active)   07/10/23 0120   Altered Skin Integrity Present on Admission - Did Patient arrive to the hospital with altered skin?:    Side: Right   Orientation: anterior   Location: Knee   Wound Number: #3   Is this injury device related?: No   Primary Wound Type:    Description of Altered Skin Integrity:    Ankle-Brachial Index: Done 5/8/2023  Rt. dp=1.06  pt= 1.15       7/18/2023  not done since had fx surgery and pt. request   Pulses: Palpable x 4,  Doppler bi phasic x 4   Removal Indication and Assessment:    Wound Outcome:    (Retired) Wound Length (cm):    (Retired) Wound Width (cm):    (Retired) Depth (cm):    Wound Description (Comments):    Removal Indications:    Wound Image   07/18/23 1109   Description of Altered Skin Integrity Full thickness tissue loss. Base is covered by slough and/or eschar in the wound bed 07/18/23 1109   Dressing Appearance Moist drainage 07/18/23 1109   Drainage Amount Moderate 07/18/23 1109   Drainage Characteristics/Odor Yellow 07/18/23 1109   Appearance Pink;Yellow;Slough;Epithelialization 07/18/23 1109   Tissue loss description Full thickness 07/18/23 1109   Black (%), Wound Tissue  Color 0 % 07/18/23 1109   Red (%), Wound Tissue Color 10 % 07/18/23 1109   Yellow (%), Wound Tissue Color 90 % 07/18/23 1109   Periwound Area Edematous;Intact 07/18/23 1109   Wound Edges Defined 07/18/23 1109   Wound Length (cm) 1.4 cm 07/18/23 1109   Wound Width (cm) 0.8 cm 07/18/23 1109   Wound Depth (cm) 0.2 cm 07/18/23 1109   Wound Volume (cm^3) 0.224 cm^3 07/18/23 1109   Wound Surface Area (cm^2) 1.12 cm^2 07/18/23 1109   Care Cleansed with:;Wound cleanser;Applied: 07/18/23 1109   Dressing Removed;Applied;Calcium alginate;Silver;Island/border 07/18/23 1109   Compression Tubular elasticized bandage 07/18/23 1109               Assessment:       1. Open wound of right knee, initial encounter    2. Disruption of internal operation (surgical) wound, not elsewhere classified, initial encounter    3. Ulcer of right lower extremity, limited to breakdown of skin    4. Ulcer of right great toe due to diabetes mellitus    5. Type 2 diabetes mellitus with foot ulcer, without long-term current use of insulin    6. Uncontrolled type 2 diabetes mellitus with hyperglycemia    7. Diabetic polyneuropathy associated with type 2 diabetes mellitus    8. Morbid obesity    9. BMI 50.0-59.9, adult    10. Medically noncompliant    11. Bipolar II disorder         Right hallux DFU: first clinic visit 5/19/22, neg xray: recalcitrant to healing complicated by his noncompliance, ongoing uncontrolled DM and frequent missed clinic visits;  deterioration late Nov 2022 with toe cellulitis: rx bactrim and augmentin and then 2 weeks of Cipro prescribed on 12/13/2022/ +tissue cx for Enterococcus on 1/3/23: rx augmentin x 4-6 weeks total : deterioration again on 5/8/12: closed 6/26/23  Right foot MRI 10/5/22: no osteomyelitis  Prior left 2nd toe osteomyelitis with partial amputation 2020   diabetes,uncontrolled:  hba1c 9.1 April 2022,  7.3 July 2022 up to 11.1 Dec 2022  Morbid obesity with bmi 57, he is gaining weight (was55)  Hypertension    Hyperlipidemia  H/o depression/bipolar  Medical noncompliance  Right tibia fx with ORIF 5/16/23: Post op surgical wound /dehiscence noted 6/23/23: ortho wants us to treat: stable  Distal RLE localized swelling with skin breakdown        Plan:         Plan of Care:                Debrided post op knee surgical wound dehsicence  Leg skin breakdown where he has a localized lump like swelling: the lump has been there since surgery pt and wife report and say ortho saw it. Neg niva; let's give tubigrips size G and see if we can decrease generalized leg edema as well   Nutrition: Must have a high protein diet to support wound  healing; (if renal disease, see nephrologist for amount allowed):  this should be over 100g protein /day (if no kidney issues); Also rec MVI along with vit C, vit D, zinc and Sidney , encourage weight loss  Diabetes: must have a strict diabetic diet, take meds and encourage lower hba1c His DM is very uncontrolled and always hyperglycemic; PCP aware and pt aware he must do better  Rtc 1 week per his request     The time spent including preparing to see the patient, obtaining patient history and assessment, evaluation of the plan of care, patient/caregiver counseling and education, orders, documentation, coordination of care, and other professional medical management activities for today's encounter was 22 minutes.Dealt with separate leg issue of skin breakddown     Time spent performing procedures during today's encounter was 8 minutes.

## 2023-08-10 ENCOUNTER — HOSPITAL ENCOUNTER (OUTPATIENT)
Dept: WOUND CARE | Facility: HOSPITAL | Age: 53
Discharge: HOME | End: 2023-08-10
Attending: EMERGENCY MEDICINE
Payer: MEDICARE

## 2023-08-10 VITALS
DIASTOLIC BLOOD PRESSURE: 84 MMHG | BODY MASS INDEX: 45.1 KG/M2 | SYSTOLIC BLOOD PRESSURE: 168 MMHG | HEART RATE: 89 BPM | HEIGHT: 70 IN | TEMPERATURE: 98 F | WEIGHT: 315 LBS | RESPIRATION RATE: 20 BRPM

## 2023-08-10 DIAGNOSIS — E11.65 UNCONTROLLED TYPE 2 DIABETES MELLITUS WITH HYPERGLYCEMIA: ICD-10-CM

## 2023-08-10 DIAGNOSIS — E11.621 TYPE 2 DIABETES MELLITUS WITH FOOT ULCER, WITHOUT LONG-TERM CURRENT USE OF INSULIN: ICD-10-CM

## 2023-08-10 DIAGNOSIS — T81.32XA DISRUPTION OF INTERNAL OPERATION (SURGICAL) WOUND, NOT ELSEWHERE CLASSIFIED, INITIAL ENCOUNTER: ICD-10-CM

## 2023-08-10 DIAGNOSIS — L97.911 ULCER OF RIGHT LOWER EXTREMITY, LIMITED TO BREAKDOWN OF SKIN: ICD-10-CM

## 2023-08-10 DIAGNOSIS — F31.81 BIPOLAR II DISORDER: ICD-10-CM

## 2023-08-10 DIAGNOSIS — E66.01 MORBID OBESITY: ICD-10-CM

## 2023-08-10 DIAGNOSIS — S81.001A OPEN WOUND OF RIGHT KNEE, INITIAL ENCOUNTER: Primary | ICD-10-CM

## 2023-08-10 DIAGNOSIS — E11.42 DIABETIC POLYNEUROPATHY ASSOCIATED WITH TYPE 2 DIABETES MELLITUS: ICD-10-CM

## 2023-08-10 DIAGNOSIS — L97.509 TYPE 2 DIABETES MELLITUS WITH FOOT ULCER, WITHOUT LONG-TERM CURRENT USE OF INSULIN: ICD-10-CM

## 2023-08-10 LAB — POCT GLUCOSE: 234 MG/DL (ref 70–110)

## 2023-08-10 PROCEDURE — 99213 OFFICE O/P EST LOW 20 MIN: CPT | Mod: ,,, | Performed by: EMERGENCY MEDICINE

## 2023-08-10 PROCEDURE — 99213 PR OFFICE/OUTPT VISIT, EST, LEVL III, 20-29 MIN: ICD-10-PCS | Mod: ,,, | Performed by: EMERGENCY MEDICINE

## 2023-08-10 PROCEDURE — 99213 OFFICE O/P EST LOW 20 MIN: CPT

## 2023-08-10 PROCEDURE — 27000999 HC MEDICAL RECORD PHOTO DOCUMENTATION

## 2023-08-10 NOTE — PROGRESS NOTES
Subjective:       Patient ID: Jong Clayton is a 53 y.o. male.    Chief Complaint: No chief complaint on file.      54 y/o WM with morbid obesity, diabetes, neuropathy hypertension, dyslipidemia, chronic back pain, bipolar and depression who has battled various DFU's with osteomyelitis (left 2nd toe 2019 and again 2020). I had treated him in the past.    He developed a right hallux DFU in Dec 2021 but did not return to clinic until May 2022.  His treatment has been very difficult because he is noncompliant, misses visits often, profoundly uncontrolled diabetes along with ongoing weight gain and an insurance difficult to deal with.     This DFU has waxed and waned and goes from almost healed to looking terrible at times.  Neg MRI in fall 2022.    His visits since early March 2023 very sporadic. To further complicate things, he  suffered a fall with right tibia fx needing ORIF on 5/16/23.  He finally returned here today on 6/23/23 with toe ulcer  healed. However it was noted he had evidence of surgical wound dehiscence over knee area. He followed up with ortho that day who now has referred pt back to wound care for the knee.   I last saw him on 7/18/23: I noted skin breakdown on shin (in addition to the knee wound):   localized swelling/deformity distal shin that ortho is aware of ; pt failed to return for past month.   Comes in today on 8 /10/23. Knee post op wound healed; still has large distal leg shin deformity with denuded spots ;seeing ortho today actually;         Review of Systems   Constitutional:  Positive for fatigue. Negative for chills and fever.   HENT: Negative.     Respiratory: Negative.     Cardiovascular: Negative.    Gastrointestinal: Negative.    Musculoskeletal:  Positive for back pain.   Skin:  Positive for wound (see hpi).   Neurological:  Positive for numbness (to both feet). Negative for tremors, seizures, syncope and speech difficulty.         Objective:       Vitals:    08/10/23 1313    BP: (!) 168/84   Pulse: 89   Resp: 20   Temp: 98 °F (36.7 °C)            Physical Exam  Constitutional:       Appearance: He is morbidly obese.   HENT:      Head: Normocephalic and atraumatic.   Eyes:      Conjunctiva/sclera: Conjunctivae normal.   Cardiovascular:      Pulses: Normal pulses.           Dorsalis pedis pulses are 2+ on the right side and 2+ on the left side.      Comments: +hair on legs  Musculoskeletal:        Legs:         Feet:       Comments: Swollen prominent deformity on distal anterior shin with several superficial areas of skin breakdown; no signs of infection   Feet:      Comments: Hallux valgus deformity noted; min movement of ankle noted  Skin:     General: Skin is warm.      Capillary Refill: Capillary refill takes less than 2 seconds.   Neurological:      General: No focal deficit present.      Mental Status: He is alert and oriented to person, place, and time. Mental status is at baseline.              Altered Skin Integrity 07/10/23 0120 Right anterior Knee #3  (Active)   07/10/23 0120   Altered Skin Integrity Present on Admission - Did Patient arrive to the hospital with altered skin?:    Side: Right   Orientation: anterior   Location: Knee   Wound Number: #3   Is this injury device related?: No   Primary Wound Type:    Description of Altered Skin Integrity:    Ankle-Brachial Index: Done 5/8/2023  Rt. dp=1.06  pt= 1.15       7/18/2023  not done since had fx surgery and pt. request   Pulses: Palpable DP x 2,  Doppler bi phasic x 4   Removal Indication and Assessment:    Wound Outcome:    (Retired) Wound Length (cm):    (Retired) Wound Width (cm):    (Retired) Depth (cm):    Wound Description (Comments):    Removal Indications:    Wound Image    08/10/23 1306   Dressing Appearance Open to air 08/10/23 1306   Drainage Amount None 08/10/23 1306   Appearance Eschar;Epithelialization 08/10/23 1306   Periwound Area Intact 08/10/23 1306   Wound Edges Defined 08/10/23 1306   Wound Length (cm)  0.4 cm 08/10/23 1306   Wound Width (cm) 0.3 cm 08/10/23 1306   Wound Depth (cm) 0 cm 08/10/23 1306   Wound Volume (cm^3) 0 cm^3 08/10/23 1306   Wound Surface Area (cm^2) 0.12 cm^2 08/10/23 1306   Care Cleansed with:;Wound cleanser;Applied: 08/10/23 1306   Dressing Other (comment) 08/10/23 1306               Assessment:       1. Open wound of right knee, initial encounter    2. Disruption of internal operation (surgical) wound, not elsewhere classified, initial encounter    3. Ulcer of right lower extremity, limited to breakdown of skin    4. Type 2 diabetes mellitus with foot ulcer, without long-term current use of insulin    5. Uncontrolled type 2 diabetes mellitus with hyperglycemia    6. Diabetic polyneuropathy associated with type 2 diabetes mellitus    7. Morbid obesity    8. BMI 50.0-59.9, adult    9. Bipolar II disorder         Right hallux DFU: first clinic visit 5/19/22, neg xray: recalcitrant to healing complicated by his noncompliance, ongoing uncontrolled DM and frequent missed clinic visits;  deterioration late Nov 2022 with toe cellulitis: rx bactrim and augmentin and then 2 weeks of Cipro prescribed on 12/13/2022/ +tissue cx for Enterococcus on 1/3/23: rx augmentin x 4-6 weeks total : deterioration again on 5/8/12: closed 6/26/23  Right foot MRI 10/5/22: no osteomyelitis  Prior left 2nd toe osteomyelitis with partial amputation 2020   diabetes,uncontrolled:  hba1c 9.1 April 2022,  7.3 July 2022 up to 11.1 Dec 2022  Morbid obesity with bmi 57, he is gaining weight (was54)  Hypertension   Hyperlipidemia  H/o depression/bipolar  Medical noncompliance  Right tibia fx with ORIF 5/16/23: Post op surgical wound /dehiscence noted 6/23/23: ortho wants us to treat:  closed 8/10/23  Distal RLE localized deformity/swelling with skin breakdown since surgery        Plan:         Plan of Care:                  post op knee surgical wound closed  See ortho today as scheduled to further evaluate this localized distal  shin deformity which causes skin breakdown; I am sure xrays will be done; pt will let us know if he needs to return here  Nutrition: Must have a high protein diet to support wound  healing; (if renal disease, see nephrologist for amount allowed):  this should be over 100g protein /day (if no kidney issues); Also rec MVI along with vit C, vit D, zinc and Sidney , encourage weight loss  Diabetes: must have a strict diabetic diet, take meds and encourage lower hba1c His DM is very uncontrolled and always hyperglycemic; PCP aware and pt aware he must do better         The time spent including preparing to see the patient, obtaining patient history and assessment, evaluation of the plan of care, patient/caregiver counseling and education, orders, documentation, coordination of care, and other professional medical management activities for today's encounter was 20 minutes.

## 2023-08-10 NOTE — PATIENT INSTRUCTIONS
Pt seen today by:       Self care DRESSING INSTRUCTIONS:       Wound location:  Right knee    Healed    Distal right leg:  Cont. To follow with orthopedic MD    May use  aquacel silver to open areas or antibiotic oint.  and cover with dressing of choice      Return to clinic:  To return to clinic as needed    Wound may have been debrided in clinic: if so, WHAT YOU NEED TO KNOW:    Debridement is the removal of infected, damaged, or dead tissue so a wound can heal properly. Your wound may need more than one debridement. Debridement can cause bleeding, and a small amount of blood is expected.  AFTER A DEBRIDEMENT:  Keep your wound clean and dry. Do not remove the dressing unless instructed.  Follow the wound care orders provided to you or your home health care provider.  If you have pain, take over the counter pain relievers or pain medication if prescribed.  Elevate the wound and limit excessive activity to prevent bleeding and/or swelling in your wound.  If you see blood coming through the dressing, apply gauze and tape over the dressing and hold firm pressure to the wound with your hand for 5-10 minutes continuously, without peeking, to help the bleeding stop.  Contact [facility] wound care team at [phone] or go to the nearest Emergency department if:  You have a fever greater than 101 taken by mouth.  Your pain gets worse or does not go away, even after taking your regular pain medicine.  Your skin around your wound is red, hot, swollen, or draining pus.  You have bleeding that continues to come through the dressing after holding pressure for 10 minutes   The current daily value (%DV) for protein is 50 grams per day and is meant as a general goal for most people. Further increasing your dietary protein intake is very important for wound healing. Typically one needs over 100g of protein per day to help with wound healing needs.  If you are a dialysis patient or have problems with your kidneys, talk to  your Nephrologist about how much protein you can take in with your condition.  Examples of high protein items that can be added to your diet include: eggs, chicken, red meats, almonds, cottage cheese, Greek yogurt, beans, and peanut butter.  Fortified protein bars, shakes and drinks can add 15-30 additional grams of protein per serving.   Also add:   1 daily general multivitamin   Sidney : 1 packet twice daily   Vitamin C : 500mg twice daily   Zinc 220 mg daily  Vit D : once daily    Call our [facility] wound clinic for questions/concerns a [phone] .